# Patient Record
Sex: FEMALE | Race: WHITE | Employment: FULL TIME | URBAN - METROPOLITAN AREA
[De-identification: names, ages, dates, MRNs, and addresses within clinical notes are randomized per-mention and may not be internally consistent; named-entity substitution may affect disease eponyms.]

---

## 2018-02-09 PROBLEM — G45.9 TIA (TRANSIENT ISCHEMIC ATTACK): Status: ACTIVE | Noted: 2018-02-09

## 2018-02-10 PROBLEM — I63.9 ACUTE CVA (CEREBROVASCULAR ACCIDENT) (HCC): Status: ACTIVE | Noted: 2018-02-10

## 2018-02-26 PROBLEM — I48.0 PAF (PAROXYSMAL ATRIAL FIBRILLATION) (HCC): Status: ACTIVE | Noted: 2018-02-26

## 2018-03-14 ENCOUNTER — OFFICE VISIT (OUTPATIENT)
Dept: CARDIOLOGY CLINIC | Age: 63
End: 2018-03-14
Payer: COMMERCIAL

## 2018-03-14 VITALS
WEIGHT: 169.9 LBS | SYSTOLIC BLOOD PRESSURE: 118 MMHG | HEART RATE: 100 BPM | RESPIRATION RATE: 16 BRPM | DIASTOLIC BLOOD PRESSURE: 82 MMHG | BODY MASS INDEX: 25.75 KG/M2 | HEIGHT: 68 IN

## 2018-03-14 DIAGNOSIS — I48.0 PAF (PAROXYSMAL ATRIAL FIBRILLATION) (HCC): Primary | ICD-10-CM

## 2018-03-14 PROCEDURE — G8427 DOCREV CUR MEDS BY ELIG CLIN: HCPCS | Performed by: INTERNAL MEDICINE

## 2018-03-14 PROCEDURE — 1036F TOBACCO NON-USER: CPT | Performed by: INTERNAL MEDICINE

## 2018-03-14 PROCEDURE — 3017F COLORECTAL CA SCREEN DOC REV: CPT | Performed by: INTERNAL MEDICINE

## 2018-03-14 PROCEDURE — 99214 OFFICE O/P EST MOD 30 MIN: CPT | Performed by: INTERNAL MEDICINE

## 2018-03-14 PROCEDURE — 1111F DSCHRG MED/CURRENT MED MERGE: CPT | Performed by: INTERNAL MEDICINE

## 2018-03-14 PROCEDURE — G8419 CALC BMI OUT NRM PARAM NOF/U: HCPCS | Performed by: INTERNAL MEDICINE

## 2018-03-14 PROCEDURE — 93000 ELECTROCARDIOGRAM COMPLETE: CPT | Performed by: INTERNAL MEDICINE

## 2018-03-14 PROCEDURE — G8484 FLU IMMUNIZE NO ADMIN: HCPCS | Performed by: INTERNAL MEDICINE

## 2018-03-14 PROCEDURE — 3014F SCREEN MAMMO DOC REV: CPT | Performed by: INTERNAL MEDICINE

## 2018-03-14 PROCEDURE — G8598 ASA/ANTIPLAT THER USED: HCPCS | Performed by: INTERNAL MEDICINE

## 2018-03-14 RX ORDER — METOPROLOL SUCCINATE 25 MG
25 TABLET, EXTENDED RELEASE 24 HR ORAL 2 TIMES DAILY
Qty: 145 TABLET | Refills: 3
Start: 2018-03-14 | End: 2018-05-14 | Stop reason: SDUPTHER

## 2018-03-15 ENCOUNTER — TELEPHONE (OUTPATIENT)
Dept: NEUROLOGY | Age: 63
End: 2018-03-15

## 2018-03-15 ENCOUNTER — TELEPHONE (OUTPATIENT)
Dept: CARDIOLOGY CLINIC | Age: 63
End: 2018-03-15

## 2018-03-15 NOTE — PROGRESS NOTES
Illness: Dayron Pelletier states that She does house work , goes up the stairs, & goes shopping. She states that she is feeling much improved and much less energy  She denies any chest discomfort, dyspnea on exertion, orthopnea/PND. She has chronic lower extremity edema that she states has not changed for many years. She denies any presyncopal symptoms. She does occasionally still feel some palpitations. REVIEW OF SYSTEMS:  As above. Patient does not complain of any fever, chills, nausea, vomiting or diarrhea. No focal, motor or neurological deficits. No changes in his/her vision, hearing, bowel or bladder habits. She is not known to have a history of thyroid problems. No recent nose bleeds. PHYSICAL EXAM:  Vitals:    03/14/18 0801   BP: 118/82   Pulse: 100   Resp: 16   Weight: 169 lb 14.4 oz (77.1 kg)   Height: 5' 8\" (1.727 m)       GENERAL:  She is alert and oriented x 3, communicates well, in no distress. NECK:  No masses, trachea is mid position. Supple, full ROM, no JVD or bruits. No palpable thyromegaly or lymphadenopathy. HEART:  irregular rate and rhythm. Normal S1 and S2. There is a I/VI systolic murmur. LUNGS:  Clear to auscultation bilaterally. No use of accessory muscles. symmetrical excursion. ABDOMEN:  Soft, non-tender. Normal bowel sounds. EXTREMITIES:  Full ROM x 4. Mild to moderate bilateral lower extremity edema. Good distal pulses. EYES:  Extraocular muscles intact. PERRL. Normal lids & conjunctiva. ENT:  Nares are clear & not bleeding. Moist mucosa. Normal lips formation. No external masses   NEURO: no tremors, full ROM x 4, EOMI. SKIN:  Warm, dry and intact. Normal turgor. EKG: AF, 100 bpm, nl axis, nonspecific ST - T wave changes. Assessment:   1. Acute CVA 2-8-18. 2. Newly diagnosed atrial fibrillation and Dr. Dustin Esquivelure office. Eliquis started at that time. Probable etiology for her CVA  3. Hypertension  4.  Her heart rate and blood pressure

## 2018-03-15 NOTE — TELEPHONE ENCOUNTER
Per Dr. Marlee Galan, check with neurology to see if patient can stop Plavix since she is on Eliquis for AFIB. Have neuro office call patient. Spoke with Elena Ballard. She will check with Dr. Avtar Velázquez or his NP Jojo Irby). I will keep this encounter open until there is an answer and patient is notified/chart amended.

## 2018-03-15 NOTE — TELEPHONE ENCOUNTER
From neurology standpoint - no need for Plavix in addition to Eliquis. Please forward to Dr. Walter Brar office. Thank you.

## 2018-03-28 ENCOUNTER — HOSPITAL ENCOUNTER (OUTPATIENT)
Dept: ULTRASOUND IMAGING | Age: 63
Discharge: HOME | End: 2018-03-30
Payer: COMMERCIAL

## 2018-03-28 DIAGNOSIS — R10.9 ABDOMINAL PAIN, UNSPECIFIED ABDOMINAL LOCATION: ICD-10-CM

## 2018-03-28 PROCEDURE — 76705 ECHO EXAM OF ABDOMEN: CPT

## 2018-04-16 ENCOUNTER — NURSE ONLY (OUTPATIENT)
Dept: CARDIOLOGY CLINIC | Age: 63
End: 2018-04-16

## 2018-04-16 DIAGNOSIS — Z79.899 MEDICATION MANAGEMENT: Primary | ICD-10-CM

## 2018-05-02 ENCOUNTER — OFFICE VISIT (OUTPATIENT)
Dept: NEUROLOGY | Age: 63
End: 2018-05-02
Payer: COMMERCIAL

## 2018-05-02 VITALS
HEIGHT: 68 IN | DIASTOLIC BLOOD PRESSURE: 96 MMHG | WEIGHT: 170 LBS | BODY MASS INDEX: 25.76 KG/M2 | SYSTOLIC BLOOD PRESSURE: 139 MMHG | OXYGEN SATURATION: 96 % | HEART RATE: 79 BPM

## 2018-05-02 DIAGNOSIS — I63.431 CEREBROVASCULAR ACCIDENT (CVA) DUE TO EMBOLISM OF RIGHT POSTERIOR CEREBRAL ARTERY (HCC): Primary | ICD-10-CM

## 2018-05-02 PROCEDURE — 3017F COLORECTAL CA SCREEN DOC REV: CPT | Performed by: NURSE PRACTITIONER

## 2018-05-02 PROCEDURE — G8598 ASA/ANTIPLAT THER USED: HCPCS | Performed by: NURSE PRACTITIONER

## 2018-05-02 PROCEDURE — 99214 OFFICE O/P EST MOD 30 MIN: CPT | Performed by: NURSE PRACTITIONER

## 2018-05-02 PROCEDURE — G8419 CALC BMI OUT NRM PARAM NOF/U: HCPCS | Performed by: NURSE PRACTITIONER

## 2018-05-02 PROCEDURE — G8427 DOCREV CUR MEDS BY ELIG CLIN: HCPCS | Performed by: NURSE PRACTITIONER

## 2018-05-02 PROCEDURE — 1036F TOBACCO NON-USER: CPT | Performed by: NURSE PRACTITIONER

## 2018-05-14 ENCOUNTER — OFFICE VISIT (OUTPATIENT)
Dept: CARDIOLOGY CLINIC | Age: 63
End: 2018-05-14
Payer: COMMERCIAL

## 2018-05-14 VITALS
DIASTOLIC BLOOD PRESSURE: 78 MMHG | HEIGHT: 68 IN | HEART RATE: 68 BPM | SYSTOLIC BLOOD PRESSURE: 154 MMHG | WEIGHT: 168 LBS | RESPIRATION RATE: 12 BRPM | BODY MASS INDEX: 25.46 KG/M2

## 2018-05-14 DIAGNOSIS — I48.0 PAF (PAROXYSMAL ATRIAL FIBRILLATION) (HCC): Primary | ICD-10-CM

## 2018-05-14 PROCEDURE — G8419 CALC BMI OUT NRM PARAM NOF/U: HCPCS | Performed by: INTERNAL MEDICINE

## 2018-05-14 PROCEDURE — G8598 ASA/ANTIPLAT THER USED: HCPCS | Performed by: INTERNAL MEDICINE

## 2018-05-14 PROCEDURE — 1036F TOBACCO NON-USER: CPT | Performed by: INTERNAL MEDICINE

## 2018-05-14 PROCEDURE — 3017F COLORECTAL CA SCREEN DOC REV: CPT | Performed by: INTERNAL MEDICINE

## 2018-05-14 PROCEDURE — 99215 OFFICE O/P EST HI 40 MIN: CPT | Performed by: INTERNAL MEDICINE

## 2018-05-14 PROCEDURE — 93000 ELECTROCARDIOGRAM COMPLETE: CPT | Performed by: INTERNAL MEDICINE

## 2018-05-14 PROCEDURE — G8427 DOCREV CUR MEDS BY ELIG CLIN: HCPCS | Performed by: INTERNAL MEDICINE

## 2018-05-14 RX ORDER — METOPROLOL SUCCINATE 50 MG/1
50 TABLET, EXTENDED RELEASE ORAL 2 TIMES DAILY
Qty: 180 TABLET | Refills: 3 | Status: SHIPPED | OUTPATIENT
Start: 2018-05-14 | End: 2019-05-18 | Stop reason: SDUPTHER

## 2018-05-21 ENCOUNTER — HOSPITAL ENCOUNTER (OUTPATIENT)
Dept: CARDIAC CATH/INVASIVE PROCEDURES | Age: 63
Discharge: HOME | End: 2018-05-21
Payer: COMMERCIAL

## 2018-05-21 ENCOUNTER — ANESTHESIA (OUTPATIENT)
Dept: CARDIAC CATH/INVASIVE PROCEDURES | Age: 63
End: 2018-05-21

## 2018-05-21 ENCOUNTER — ANESTHESIA EVENT (OUTPATIENT)
Dept: CARDIAC CATH/INVASIVE PROCEDURES | Age: 63
End: 2018-05-21

## 2018-05-21 VITALS
RESPIRATION RATE: 18 BRPM | SYSTOLIC BLOOD PRESSURE: 170 MMHG | HEART RATE: 107 BPM | DIASTOLIC BLOOD PRESSURE: 105 MMHG | TEMPERATURE: 98.2 F

## 2018-05-21 VITALS
DIASTOLIC BLOOD PRESSURE: 89 MMHG | SYSTOLIC BLOOD PRESSURE: 131 MMHG | RESPIRATION RATE: 20 BRPM | OXYGEN SATURATION: 98 %

## 2018-05-21 LAB
ANION GAP SERPL CALCULATED.3IONS-SCNC: 16 MMOL/L (ref 7–16)
APTT: 38.9 SEC (ref 24.5–35.1)
BASOPHILS ABSOLUTE: 0.03 E9/L (ref 0–0.2)
BASOPHILS RELATIVE PERCENT: 0.3 % (ref 0–2)
BUN BLDV-MCNC: 23 MG/DL (ref 8–23)
CALCIUM SERPL-MCNC: 9.9 MG/DL (ref 8.6–10.2)
CHLORIDE BLD-SCNC: 104 MMOL/L (ref 98–107)
CO2: 24 MMOL/L (ref 22–29)
CREAT SERPL-MCNC: 0.9 MG/DL (ref 0.5–1)
EOSINOPHILS ABSOLUTE: 0.06 E9/L (ref 0.05–0.5)
EOSINOPHILS RELATIVE PERCENT: 0.7 % (ref 0–6)
GFR AFRICAN AMERICAN: >60
GFR NON-AFRICAN AMERICAN: >60 ML/MIN/1.73
GLUCOSE BLD-MCNC: 100 MG/DL (ref 74–109)
HCT VFR BLD CALC: 43.3 % (ref 34–48)
HEMOGLOBIN: 13.7 G/DL (ref 11.5–15.5)
IMMATURE GRANULOCYTES #: 0.01 E9/L
IMMATURE GRANULOCYTES %: 0.1 % (ref 0–5)
INR BLD: 1.8
LV EF: 53 %
LVEF MODALITY: NORMAL
LYMPHOCYTES ABSOLUTE: 1.68 E9/L (ref 1.5–4)
LYMPHOCYTES RELATIVE PERCENT: 18.7 % (ref 20–42)
MAGNESIUM: 1.9 MG/DL (ref 1.6–2.6)
MCH RBC QN AUTO: 27.2 PG (ref 26–35)
MCHC RBC AUTO-ENTMCNC: 31.6 % (ref 32–34.5)
MCV RBC AUTO: 86.1 FL (ref 80–99.9)
MONOCYTES ABSOLUTE: 0.71 E9/L (ref 0.1–0.95)
MONOCYTES RELATIVE PERCENT: 7.9 % (ref 2–12)
NEUTROPHILS ABSOLUTE: 6.48 E9/L (ref 1.8–7.3)
NEUTROPHILS RELATIVE PERCENT: 72.3 % (ref 43–80)
PDW BLD-RTO: 13 FL (ref 11.5–15)
PLATELET # BLD: 173 E9/L (ref 130–450)
PMV BLD AUTO: 12 FL (ref 7–12)
POTASSIUM SERPL-SCNC: 4.2 MMOL/L (ref 3.5–5)
PROTHROMBIN TIME: 20.3 SEC (ref 9.3–12.4)
RBC # BLD: 5.03 E12/L (ref 3.5–5.5)
SODIUM BLD-SCNC: 144 MMOL/L (ref 132–146)
WBC # BLD: 9 E9/L (ref 4.5–11.5)

## 2018-05-21 PROCEDURE — 93325 DOPPLER ECHO COLOR FLOW MAPG: CPT

## 2018-05-21 PROCEDURE — 85610 PROTHROMBIN TIME: CPT

## 2018-05-21 PROCEDURE — 36415 COLL VENOUS BLD VENIPUNCTURE: CPT

## 2018-05-21 PROCEDURE — 2580000003 HC RX 258: Performed by: INTERNAL MEDICINE

## 2018-05-21 PROCEDURE — 2709999900 HC NON-CHARGEABLE SUPPLY

## 2018-05-21 PROCEDURE — 83735 ASSAY OF MAGNESIUM: CPT

## 2018-05-21 PROCEDURE — 6360000002 HC RX W HCPCS: Performed by: NURSE ANESTHETIST, CERTIFIED REGISTERED

## 2018-05-21 PROCEDURE — 99999 PR OFFICE/OUTPT VISIT,PROCEDURE ONLY: CPT | Performed by: INTERNAL MEDICINE

## 2018-05-21 PROCEDURE — 6370000000 HC RX 637 (ALT 250 FOR IP): Performed by: INTERNAL MEDICINE

## 2018-05-21 PROCEDURE — 3700000001 HC ADD 15 MINUTES (ANESTHESIA)

## 2018-05-21 PROCEDURE — 3700000000 HC ANESTHESIA ATTENDED CARE

## 2018-05-21 PROCEDURE — 2580000003 HC RX 258: Performed by: NURSE ANESTHETIST, CERTIFIED REGISTERED

## 2018-05-21 PROCEDURE — 92960 CARDIOVERSION ELECTRIC EXT: CPT | Performed by: INTERNAL MEDICINE

## 2018-05-21 PROCEDURE — 85025 COMPLETE CBC W/AUTO DIFF WBC: CPT

## 2018-05-21 PROCEDURE — 93312 ECHO TRANSESOPHAGEAL: CPT

## 2018-05-21 PROCEDURE — 80048 BASIC METABOLIC PNL TOTAL CA: CPT

## 2018-05-21 PROCEDURE — 93321 DOPPLER ECHO F-UP/LMTD STD: CPT

## 2018-05-21 PROCEDURE — 85730 THROMBOPLASTIN TIME PARTIAL: CPT

## 2018-05-21 RX ORDER — SOTALOL HYDROCHLORIDE 80 MG/1
80 TABLET ORAL 2 TIMES DAILY
Status: DISCONTINUED | OUTPATIENT
Start: 2018-05-21 | End: 2018-05-21

## 2018-05-21 RX ORDER — SODIUM CHLORIDE 9 MG/ML
INJECTION, SOLUTION INTRAVENOUS ONCE
Status: COMPLETED | OUTPATIENT
Start: 2018-05-21 | End: 2018-05-21

## 2018-05-21 RX ORDER — SODIUM CHLORIDE 9 MG/ML
INJECTION, SOLUTION INTRAVENOUS CONTINUOUS PRN
Status: DISCONTINUED | OUTPATIENT
Start: 2018-05-21 | End: 2018-05-21 | Stop reason: SDUPTHER

## 2018-05-21 RX ORDER — PROPOFOL 10 MG/ML
INJECTION, EMULSION INTRAVENOUS PRN
Status: DISCONTINUED | OUTPATIENT
Start: 2018-05-21 | End: 2018-05-21 | Stop reason: SDUPTHER

## 2018-05-21 RX ADMIN — SODIUM CHLORIDE: 9 INJECTION, SOLUTION INTRAVENOUS at 11:21

## 2018-05-21 RX ADMIN — SODIUM CHLORIDE: 9 INJECTION, SOLUTION INTRAVENOUS at 09:56

## 2018-05-21 RX ADMIN — Medication 1 LOZENGE: at 12:35

## 2018-05-21 RX ADMIN — PROPOFOL 220 MG: 10 INJECTION, EMULSION INTRAVENOUS at 11:40

## 2018-05-21 ASSESSMENT — ENCOUNTER SYMPTOMS: SHORTNESS OF BREATH: 0

## 2018-05-30 ENCOUNTER — NURSE ONLY (OUTPATIENT)
Dept: CARDIOLOGY CLINIC | Age: 63
End: 2018-05-30
Payer: COMMERCIAL

## 2018-05-30 DIAGNOSIS — I48.0 PAF (PAROXYSMAL ATRIAL FIBRILLATION) (HCC): Primary | ICD-10-CM

## 2018-05-30 PROCEDURE — 93000 ELECTROCARDIOGRAM COMPLETE: CPT | Performed by: INTERNAL MEDICINE

## 2018-07-12 ENCOUNTER — HOSPITAL ENCOUNTER (OUTPATIENT)
Dept: GENERAL RADIOLOGY | Age: 63
Discharge: HOME | End: 2018-07-14
Payer: COMMERCIAL

## 2018-07-12 ENCOUNTER — HOSPITAL ENCOUNTER (OUTPATIENT)
Dept: MRI IMAGING | Age: 63
Discharge: HOME | End: 2018-07-14
Payer: COMMERCIAL

## 2018-07-12 DIAGNOSIS — K83.1 OBSTRUCTION OF BILE DUCT: ICD-10-CM

## 2018-07-12 DIAGNOSIS — Z12.31 VISIT FOR SCREENING MAMMOGRAM: ICD-10-CM

## 2018-07-12 DIAGNOSIS — R10.9 ABDOMINAL PAIN, UNSPECIFIED ABDOMINAL LOCATION: ICD-10-CM

## 2018-07-12 DIAGNOSIS — R94.5 ABNORMAL RESULTS OF LIVER FUNCTION STUDIES: ICD-10-CM

## 2018-07-12 PROCEDURE — 74183 MRI ABD W/O CNTR FLWD CNTR: CPT

## 2018-07-12 PROCEDURE — A9579 GAD-BASE MR CONTRAST NOS,1ML: HCPCS | Performed by: RADIOLOGY

## 2018-07-12 PROCEDURE — 77063 BREAST TOMOSYNTHESIS BI: CPT

## 2018-07-12 PROCEDURE — 6360000004 HC RX CONTRAST MEDICATION: Performed by: RADIOLOGY

## 2018-07-12 RX ADMIN — GADOTERIDOL 15 ML: 279.3 INJECTION, SOLUTION INTRAVENOUS at 15:35

## 2018-08-14 ENCOUNTER — OFFICE VISIT (OUTPATIENT)
Dept: CARDIOLOGY CLINIC | Age: 63
End: 2018-08-14
Payer: COMMERCIAL

## 2018-08-14 VITALS
BODY MASS INDEX: 25.46 KG/M2 | SYSTOLIC BLOOD PRESSURE: 138 MMHG | WEIGHT: 168 LBS | HEIGHT: 68 IN | DIASTOLIC BLOOD PRESSURE: 78 MMHG | HEART RATE: 60 BPM | RESPIRATION RATE: 12 BRPM

## 2018-08-14 DIAGNOSIS — I48.0 PAF (PAROXYSMAL ATRIAL FIBRILLATION) (HCC): Primary | ICD-10-CM

## 2018-08-14 DIAGNOSIS — I10 ESSENTIAL HYPERTENSION: ICD-10-CM

## 2018-08-14 PROCEDURE — 93000 ELECTROCARDIOGRAM COMPLETE: CPT | Performed by: INTERNAL MEDICINE

## 2018-08-14 PROCEDURE — 3017F COLORECTAL CA SCREEN DOC REV: CPT | Performed by: INTERNAL MEDICINE

## 2018-08-14 PROCEDURE — G8419 CALC BMI OUT NRM PARAM NOF/U: HCPCS | Performed by: INTERNAL MEDICINE

## 2018-08-14 PROCEDURE — 99214 OFFICE O/P EST MOD 30 MIN: CPT | Performed by: INTERNAL MEDICINE

## 2018-08-14 PROCEDURE — G8427 DOCREV CUR MEDS BY ELIG CLIN: HCPCS | Performed by: INTERNAL MEDICINE

## 2018-08-14 PROCEDURE — G8598 ASA/ANTIPLAT THER USED: HCPCS | Performed by: INTERNAL MEDICINE

## 2018-08-14 PROCEDURE — 1036F TOBACCO NON-USER: CPT | Performed by: INTERNAL MEDICINE

## 2018-08-14 RX ORDER — LISINOPRIL 10 MG/1
10 TABLET ORAL 2 TIMES DAILY
Qty: 180 TABLET | Refills: 3 | Status: SHIPPED
Start: 2018-08-14 | End: 2020-12-29

## 2018-08-15 NOTE — PROGRESS NOTES
exertion, orthopnea/PND. She has chronic lower extremity edema that she states has not changed for many years. She denies any presyncopal symptoms. She does occasionally still feel some palpitations. REVIEW OF SYSTEMS:  As above. Patient does not complain of any fever, chills, nausea, vomiting or diarrhea. No focal, motor or neurological deficits. No changes in his/her vision, hearing, bowel or bladder habits. She is not known to have a history of thyroid problems. No recent nose bleeds. PHYSICAL EXAM:  Vitals:    08/14/18 0751   BP: 138/78   Pulse: 60   Resp: 12   Weight: 168 lb (76.2 kg)   Height: 5' 8\" (1.727 m)       GENERAL:  She is alert and oriented x 3, communicates well, in no distress. NECK:  No masses, trachea is mid position. Supple, full ROM, no JVD or bruits. No palpable thyromegaly or lymphadenopathy. HEART:  irregular rate and rhythm. Normal S1 and S2. There is a I-II/VI systolic murmur. LUNGS:  Clear to auscultation bilaterally. No use of accessory muscles. symmetrical excursion. ABDOMEN:  Soft, non-tender. Normal bowel sounds. EXTREMITIES:  Full ROM x 4. Mild to moderate bilateral lower extremity edema. Good distal pulses. EYES:  Extraocular muscles intact. PERRL. Normal lids & conjunctiva. ENT:  Nares are clear & not bleeding. Moist mucosa. Normal lips formation. No external masses   NEURO: no tremors, full ROM x 4, EOMI. SKIN:  Warm, dry and intact. Normal turgor. EKG: Sinus rhythm, 60 bpm, nl axis, nonspecific ST - T wave changes. Assessment:   1. Acute CVA 2818. Hypertension. Not well controlled today. 2. Paroxysmal atrial fibrillation. Maintaining sinus rhythm. 3. Hypertension, not well controlled today. 4. CVA 218  5. Chronic lower extremity edema. Unchanged        Recommendations:  1. Increase the lisinopril to twice a day  2. BMP      Thank you for allowing me to participate in your patient's care.       [address], [facility]  Interventional Cardiology

## 2018-08-30 DIAGNOSIS — I10 ESSENTIAL HYPERTENSION: ICD-10-CM

## 2018-11-18 ENCOUNTER — HOSPITAL ENCOUNTER (EMERGENCY)
Age: 63
Discharge: HOME | End: 2018-11-18
Payer: COMMERCIAL

## 2018-11-18 VITALS
DIASTOLIC BLOOD PRESSURE: 97 MMHG | OXYGEN SATURATION: 97 % | TEMPERATURE: 97.4 F | SYSTOLIC BLOOD PRESSURE: 187 MMHG | HEIGHT: 68 IN | WEIGHT: 167 LBS | RESPIRATION RATE: 15 BRPM | BODY MASS INDEX: 25.31 KG/M2 | HEART RATE: 65 BPM

## 2018-11-18 DIAGNOSIS — K06.8 BLEEDING GUMS: Primary | ICD-10-CM

## 2018-11-18 LAB
BASOPHILS ABSOLUTE: 0.04 E9/L (ref 0–0.2)
BASOPHILS RELATIVE PERCENT: 0.5 % (ref 0–2)
EOSINOPHILS ABSOLUTE: 0.09 E9/L (ref 0.05–0.5)
EOSINOPHILS RELATIVE PERCENT: 1.2 % (ref 0–6)
HCT VFR BLD CALC: 43.1 % (ref 34–48)
HEMOGLOBIN: 14.1 G/DL (ref 11.5–15.5)
IMMATURE GRANULOCYTES #: 0.02 E9/L
IMMATURE GRANULOCYTES %: 0.3 % (ref 0–5)
LYMPHOCYTES ABSOLUTE: 1.54 E9/L (ref 1.5–4)
LYMPHOCYTES RELATIVE PERCENT: 20.8 % (ref 20–42)
MCH RBC QN AUTO: 28.4 PG (ref 26–35)
MCHC RBC AUTO-ENTMCNC: 32.7 % (ref 32–34.5)
MCV RBC AUTO: 86.7 FL (ref 80–99.9)
MONOCYTES ABSOLUTE: 0.75 E9/L (ref 0.1–0.95)
MONOCYTES RELATIVE PERCENT: 10.1 % (ref 2–12)
NEUTROPHILS ABSOLUTE: 4.98 E9/L (ref 1.8–7.3)
NEUTROPHILS RELATIVE PERCENT: 67.1 % (ref 43–80)
PDW BLD-RTO: 12.6 FL (ref 11.5–15)
PLATELET # BLD: 209 E9/L (ref 130–450)
PMV BLD AUTO: 11.6 FL (ref 7–12)
RBC # BLD: 4.97 E12/L (ref 3.5–5.5)
WBC # BLD: 7.4 E9/L (ref 4.5–11.5)

## 2018-11-18 PROCEDURE — 99283 EMERGENCY DEPT VISIT LOW MDM: CPT

## 2018-11-18 PROCEDURE — 36415 COLL VENOUS BLD VENIPUNCTURE: CPT

## 2018-11-18 PROCEDURE — 2500000003 HC RX 250 WO HCPCS: Performed by: PHYSICIAN ASSISTANT

## 2018-11-18 PROCEDURE — 85025 COMPLETE CBC W/AUTO DIFF WBC: CPT

## 2018-11-18 PROCEDURE — 2580000003 HC RX 258: Performed by: PHYSICIAN ASSISTANT

## 2018-11-18 RX ORDER — LIDOCAINE HYDROCHLORIDE AND EPINEPHRINE 10; 10 MG/ML; UG/ML
20 INJECTION, SOLUTION INFILTRATION; PERINEURAL ONCE
Status: COMPLETED | OUTPATIENT
Start: 2018-11-18 | End: 2018-11-18

## 2018-11-18 RX ADMIN — LIDOCAINE HYDROCHLORIDE,EPINEPHRINE BITARTRATE 20 ML: 10; .01 INJECTION, SOLUTION INFILTRATION; PERINEURAL at 11:19

## 2018-11-18 RX ADMIN — TRANEXAMIC ACID 1000 MG: 100 INJECTION, SOLUTION INTRAVENOUS at 11:20

## 2018-11-18 ASSESSMENT — PAIN SCALES - GENERAL: PAINLEVEL_OUTOF10: 0

## 2018-11-18 NOTE — ED PROVIDER NOTES
Medications   lidocaine-EPINEPHrine 1 percent-1:[id number] injection 20 mL (20 mLs Intradermal Given 11/18/18 1119)   tranexamic acid (CYKLOKAPRON) 1,000 mg in dextrose 5 % 100 mL IVPB (0 mg Irrigation Stopped 11/18/18 1126)        Consult(s):   Dental Resident was not consulted to see patient regarding complaint. Procedure(s):    none. Counseling/MDM:    Patient in no acute distress. Vital signs stable. CBC stable. Accommodation of lidocaine with epinephrine as well as TXA was used on a dressing applied to the area. Bleeding controlled. Patient will follow with PCP. Educated on the signs and symptoms to return to the ED. Discharged in stable condition. Assessment      1. Bleeding gums      Plan   Discharge to home  Patient condition is good    New Medications     Discharge Medication List as of 11/18/2018 10:54 AM        Electronically signed by MARGRET Reynolds   DD: 11/18/18  **This report was transcribed using voice recognition software. Every effort was made to ensure accuracy; however, inadvertent computerized transcription errors may be present.   END OF ED PROVIDER NOTE       Eric Reynolds  11/18/18 5001

## 2018-12-04 ENCOUNTER — OFFICE VISIT (OUTPATIENT)
Dept: NEUROLOGY | Age: 63
End: 2018-12-04
Payer: COMMERCIAL

## 2018-12-04 VITALS
HEART RATE: 62 BPM | HEIGHT: 68 IN | SYSTOLIC BLOOD PRESSURE: 159 MMHG | BODY MASS INDEX: 25.45 KG/M2 | TEMPERATURE: 97.3 F | WEIGHT: 167.9 LBS | DIASTOLIC BLOOD PRESSURE: 81 MMHG | OXYGEN SATURATION: 99 % | RESPIRATION RATE: 14 BRPM

## 2018-12-04 DIAGNOSIS — I63.431 CEREBROVASCULAR ACCIDENT (CVA) DUE TO EMBOLISM OF RIGHT POSTERIOR CEREBRAL ARTERY (HCC): Primary | ICD-10-CM

## 2018-12-04 PROCEDURE — 3017F COLORECTAL CA SCREEN DOC REV: CPT | Performed by: CLINICAL NURSE SPECIALIST

## 2018-12-04 PROCEDURE — G8484 FLU IMMUNIZE NO ADMIN: HCPCS | Performed by: CLINICAL NURSE SPECIALIST

## 2018-12-04 PROCEDURE — 1036F TOBACCO NON-USER: CPT | Performed by: CLINICAL NURSE SPECIALIST

## 2018-12-04 PROCEDURE — G8598 ASA/ANTIPLAT THER USED: HCPCS | Performed by: CLINICAL NURSE SPECIALIST

## 2018-12-04 PROCEDURE — G8427 DOCREV CUR MEDS BY ELIG CLIN: HCPCS | Performed by: CLINICAL NURSE SPECIALIST

## 2018-12-04 PROCEDURE — 99215 OFFICE O/P EST HI 40 MIN: CPT | Performed by: CLINICAL NURSE SPECIALIST

## 2018-12-04 PROCEDURE — G8419 CALC BMI OUT NRM PARAM NOF/U: HCPCS | Performed by: CLINICAL NURSE SPECIALIST

## 2018-12-04 NOTE — PROGRESS NOTES
needed for Pain Yes Neri Hollis MD   aspirin 81 MG chewable tablet Take 1 tablet by mouth daily Yes Neri Hollis MD   atorvastatin (LIPITOR) 40 MG tablet Take 1 tablet by mouth nightly Yes Neri Hollis MD   calcium carbonate (OSCAL) 500 MG TABS tablet Take 1 tablet by mouth 2 times daily Yes Neri Hollis MD       Allergies as of 12/04/2018    (No Known Allergies)       Objective:     BP (!) 159/81 (Site: Left Upper Arm, Position: Sitting, Cuff Size: Medium Adult)   Pulse 62   Temp 97.3 °F (36.3 °C) (Oral)   Resp 14   Ht 5' 8\" (1.727 m)   Wt 167 lb 14.4 oz (76.2 kg)   SpO2 99%   BMI 25.53 kg/m²      General appearance: alert, appears stated age and cooperative  Head: Normocephalic, without obvious abnormality, atraumatic  Neck: no adenopathy, no carotid bruit and limited ROM  Lungs: clear to auscultation bilaterally  Heart: regular rate and rhythm  Extremities: no cyanosis or edema  Pulses: 2+ and symmetric  Skin: no rashes or lesions    Mental Status: Alert, oriented, thought content appropriate    Speech: clear  Language: appropriate    Cranial Nerves:  I: smell    II: visual acuity     II: visual fields Left lower quadrantanopsia    II: pupils ELIECER   III,VII: ptosis None   III,IV,VI: extraocular muscles  EOMI without nystagmus    V: mastication Normal   V: facial light touch sensation  Normal   V,VII: corneal reflex  Present   VII: facial muscle function - upper     VII: facial muscle function - lower Normal   VIII: hearing Normal   IX: soft palate elevation  Normal   IX,X: gag reflex Present   XI: trapezius strength  5/5   XI: sternocleidomastoid strength 5/5   XI: neck extension strength  5/5   XII: tongue strength  Normal     Motor:  5/5 throughout  Normal bulk and tone    Sensory:  Normal to LT and PP  Vibration minimally decreased in ankles      Coordination:   FN, FFM and SERGEI symmetrical  HS testing symmetrical     Gait:  Normal     DTR:   No reflexes    No Jaimes's

## 2019-01-29 RX ORDER — METOPROLOL SUCCINATE 100 MG/1
100 TABLET, EXTENDED RELEASE ORAL 2 TIMES DAILY
Qty: 30 TABLET | Refills: 11 | Status: SHIPPED
Start: 2019-01-29 | End: 2020-02-17 | Stop reason: SDUPTHER

## 2019-05-20 RX ORDER — METOPROLOL SUCCINATE 50 MG/1
TABLET, EXTENDED RELEASE ORAL
Qty: 180 TABLET | Refills: 3 | Status: SHIPPED
Start: 2019-05-20 | End: 2020-06-07

## 2019-06-12 NOTE — TELEPHONE ENCOUNTER
Pt calling to see if there are any samples of Eliquis 5 mg bid she would be able to . Please call on her cell number.

## 2019-06-12 NOTE — TELEPHONE ENCOUNTER
I called and left a message on Moon's voicemail letting her know her samples of Eliquis are available to be picked up.

## 2019-07-02 ENCOUNTER — HOSPITAL ENCOUNTER (OUTPATIENT)
Dept: ULTRASOUND IMAGING | Age: 64
Discharge: HOME | End: 2019-07-04
Payer: COMMERCIAL

## 2019-07-02 DIAGNOSIS — R94.5 ABNORMAL FINDING ON LIVER FUNCTION: ICD-10-CM

## 2019-07-02 PROCEDURE — 76700 US EXAM ABDOM COMPLETE: CPT

## 2019-07-16 ENCOUNTER — OFFICE VISIT (OUTPATIENT)
Dept: NEUROLOGY | Age: 64
End: 2019-07-16
Payer: COMMERCIAL

## 2019-07-16 VITALS
HEIGHT: 68 IN | HEART RATE: 66 BPM | RESPIRATION RATE: 18 BRPM | SYSTOLIC BLOOD PRESSURE: 148 MMHG | DIASTOLIC BLOOD PRESSURE: 86 MMHG | WEIGHT: 171.4 LBS | BODY MASS INDEX: 25.98 KG/M2 | OXYGEN SATURATION: 98 %

## 2019-07-16 DIAGNOSIS — I63.431 CEREBROVASCULAR ACCIDENT (CVA) DUE TO EMBOLISM OF RIGHT POSTERIOR CEREBRAL ARTERY (HCC): Primary | ICD-10-CM

## 2019-07-16 PROCEDURE — G8427 DOCREV CUR MEDS BY ELIG CLIN: HCPCS | Performed by: CLINICAL NURSE SPECIALIST

## 2019-07-16 PROCEDURE — G8598 ASA/ANTIPLAT THER USED: HCPCS | Performed by: CLINICAL NURSE SPECIALIST

## 2019-07-16 PROCEDURE — 3017F COLORECTAL CA SCREEN DOC REV: CPT | Performed by: CLINICAL NURSE SPECIALIST

## 2019-07-16 PROCEDURE — 99214 OFFICE O/P EST MOD 30 MIN: CPT | Performed by: CLINICAL NURSE SPECIALIST

## 2019-07-16 PROCEDURE — G8419 CALC BMI OUT NRM PARAM NOF/U: HCPCS | Performed by: CLINICAL NURSE SPECIALIST

## 2019-07-16 PROCEDURE — 1036F TOBACCO NON-USER: CPT | Performed by: CLINICAL NURSE SPECIALIST

## 2019-07-16 NOTE — PROGRESS NOTES
Laboratory/Radiology:     CBC:   Lab Results   Component Value Date    WBC 7.4 2018    RBC 4.97 2018    HGB 14.1 2018    HCT 43.1 2018    MCV 86.7 2018    MCH 28.4 2018    MCHC 32.7 2018    RDW 12.6 2018     2018    MPV 11.6 2018     BMP:    Lab Results   Component Value Date     2018    K 4.2 2018     2018    CO2 24 2018    BUN 23 2018    LABALBU 3.8 2018    CREATININE 0.9 2018    CALCIUM 9.9 2018    GFRAA >60 2018    LABGLOM >60 2018    GLUCOSE 100 2018     HgBA1c:    Lab Results   Component Value Date    LABA1C 5.5 02/10/2018     FLP:    Lab Results   Component Value Date    TRIG 57 02/10/2018    HDL 70 02/10/2018    LDLCALC 138 02/10/2018    LABVLDL 11 02/10/2018     CTAs: no occlusion or stenosis     MRI Brain: acute stroke right occipital lobe - moderate remote      Echo w/bubble study:  Ejection fraction is visually estimated at 55%. No regional wall motion abnormalities seen. Normal left ventricular diastolic filling pattern for age. Normal right ventricle structure and function. Left atrial volume index of 29 ml per meters squared BSA. Physiologic and/or trace mitral regurgitation is present. Physiologic and/or trace tricuspid regurgitation. Agitated saline injected for shunt evaluation. No evidence of patent foramen ovale. No evidence of atrial septal defect. I personally reviewed the patient's lab and imaging studies at this time. Assessment:     Right occipital lobe ischemic stroke   Producing left lower quadrantanopsia      This stroke was likely embolic - she has been found to have atrial fibrillation - the likely source of her stroke. I agree with [address] [place] [address] Road and management of atrial fibrillation per cardiology    She will also continue statin therapy.      Patient Active Problem List   Diagnosis    TIA (transient ischemic attack)

## 2019-07-23 ENCOUNTER — HOSPITAL ENCOUNTER (OUTPATIENT)
Dept: MAMMOGRAPHY | Age: 64
Discharge: HOME | End: 2019-07-25
Payer: COMMERCIAL

## 2019-07-23 DIAGNOSIS — Z12.31 ENCOUNTER FOR SCREENING MAMMOGRAM FOR MALIGNANT NEOPLASM OF BREAST: ICD-10-CM

## 2019-07-23 PROCEDURE — 77063 BREAST TOMOSYNTHESIS BI: CPT

## 2019-07-29 ENCOUNTER — TELEPHONE (OUTPATIENT)
Dept: ONCOLOGY | Age: 64
End: 2019-07-29

## 2019-08-02 ENCOUNTER — TELEPHONE (OUTPATIENT)
Dept: CARDIOLOGY CLINIC | Age: 64
End: 2019-08-02

## 2019-08-06 ENCOUNTER — HOSPITAL ENCOUNTER (OUTPATIENT)
Dept: GENERAL RADIOLOGY | Age: 64
Discharge: HOME | End: 2019-08-08
Payer: COMMERCIAL

## 2019-08-06 DIAGNOSIS — N64.89 BREAST ASYMMETRY: ICD-10-CM

## 2019-08-06 DIAGNOSIS — Z12.39 BREAST CANCER SCREENING: ICD-10-CM

## 2019-08-06 PROCEDURE — 76642 ULTRASOUND BREAST LIMITED: CPT

## 2019-08-06 PROCEDURE — G0279 TOMOSYNTHESIS, MAMMO: HCPCS

## 2019-10-01 ENCOUNTER — TELEPHONE (OUTPATIENT)
Dept: CARDIOLOGY CLINIC | Age: 64
End: 2019-10-01

## 2019-10-31 ENCOUNTER — OFFICE VISIT (OUTPATIENT)
Dept: CARDIOLOGY CLINIC | Age: 64
End: 2019-10-31
Payer: COMMERCIAL

## 2019-10-31 VITALS
HEART RATE: 57 BPM | HEIGHT: 68 IN | RESPIRATION RATE: 12 BRPM | BODY MASS INDEX: 25.91 KG/M2 | DIASTOLIC BLOOD PRESSURE: 80 MMHG | WEIGHT: 171 LBS | SYSTOLIC BLOOD PRESSURE: 112 MMHG

## 2019-10-31 DIAGNOSIS — I48.91 ATRIAL FIBRILLATION, UNSPECIFIED TYPE (HCC): Primary | ICD-10-CM

## 2019-10-31 PROCEDURE — 99213 OFFICE O/P EST LOW 20 MIN: CPT | Performed by: INTERNAL MEDICINE

## 2019-10-31 PROCEDURE — 93000 ELECTROCARDIOGRAM COMPLETE: CPT | Performed by: INTERNAL MEDICINE

## 2019-10-31 PROCEDURE — 3017F COLORECTAL CA SCREEN DOC REV: CPT | Performed by: INTERNAL MEDICINE

## 2019-10-31 PROCEDURE — G8484 FLU IMMUNIZE NO ADMIN: HCPCS | Performed by: INTERNAL MEDICINE

## 2019-10-31 PROCEDURE — G8598 ASA/ANTIPLAT THER USED: HCPCS | Performed by: INTERNAL MEDICINE

## 2019-10-31 PROCEDURE — G8427 DOCREV CUR MEDS BY ELIG CLIN: HCPCS | Performed by: INTERNAL MEDICINE

## 2019-10-31 PROCEDURE — 1036F TOBACCO NON-USER: CPT | Performed by: INTERNAL MEDICINE

## 2019-10-31 PROCEDURE — G8419 CALC BMI OUT NRM PARAM NOF/U: HCPCS | Performed by: INTERNAL MEDICINE

## 2020-01-13 ENCOUNTER — TELEPHONE (OUTPATIENT)
Dept: CARDIOLOGY CLINIC | Age: 65
End: 2020-01-13

## 2020-01-17 ENCOUNTER — TELEPHONE (OUTPATIENT)
Dept: CARDIOLOGY CLINIC | Age: 65
End: 2020-01-17

## 2020-01-17 NOTE — TELEPHONE ENCOUNTER
I called and left a message on her voicemail letting her know we got in some Eliquis samples and her samples are ready to be picked up.

## 2020-02-17 RX ORDER — METOPROLOL SUCCINATE 100 MG/1
100 TABLET, EXTENDED RELEASE ORAL 2 TIMES DAILY
Qty: 60 TABLET | Refills: 11 | Status: SHIPPED
Start: 2020-02-17 | End: 2021-02-12 | Stop reason: SDUPTHER

## 2020-03-02 ENCOUNTER — TELEPHONE (OUTPATIENT)
Dept: CARDIOLOGY CLINIC | Age: 65
End: 2020-03-02

## 2020-03-02 NOTE — TELEPHONE ENCOUNTER
I called and left a message on Moisés's voicemail letting her know we are out of Eliquis but put her on the list to call when we get more in.

## 2020-03-03 ENCOUNTER — HOSPITAL ENCOUNTER (OUTPATIENT)
Dept: ULTRASOUND IMAGING | Age: 65
Discharge: HOME | End: 2020-03-05
Payer: COMMERCIAL

## 2020-03-03 PROCEDURE — 76705 ECHO EXAM OF ABDOMEN: CPT

## 2020-06-07 RX ORDER — METOPROLOL SUCCINATE 50 MG/1
TABLET, EXTENDED RELEASE ORAL
Qty: 60 TABLET | Refills: 11 | Status: SHIPPED
Start: 2020-06-07 | End: 2021-06-21

## 2020-09-22 ENCOUNTER — HOSPITAL ENCOUNTER (OUTPATIENT)
Dept: GENERAL RADIOLOGY | Age: 65
Discharge: HOME | End: 2020-09-24
Payer: COMMERCIAL

## 2020-09-22 PROCEDURE — 76642 ULTRASOUND BREAST LIMITED: CPT

## 2020-09-22 PROCEDURE — 76882 US LMTD JT/FCL EVL NVASC XTR: CPT

## 2020-09-22 PROCEDURE — G0279 TOMOSYNTHESIS, MAMMO: HCPCS

## 2020-09-30 ENCOUNTER — HOSPITAL ENCOUNTER (OUTPATIENT)
Dept: GENERAL RADIOLOGY | Age: 65
End: 2020-09-30
Payer: COMMERCIAL

## 2020-09-30 ENCOUNTER — HOSPITAL ENCOUNTER (OUTPATIENT)
Dept: GENERAL RADIOLOGY | Age: 65
Discharge: HOME | End: 2020-10-02
Payer: COMMERCIAL

## 2020-09-30 PROCEDURE — 2500000003 HC RX 250 WO HCPCS

## 2020-09-30 PROCEDURE — 88305 TISSUE EXAM BY PATHOLOGIST: CPT

## 2020-09-30 PROCEDURE — 88341 IMHCHEM/IMCYTCHM EA ADD ANTB: CPT

## 2020-09-30 PROCEDURE — 88342 IMHCHEM/IMCYTCHM 1ST ANTB: CPT

## 2020-09-30 PROCEDURE — A4648 IMPLANTABLE TISSUE MARKER: HCPCS

## 2020-09-30 PROCEDURE — 77065 DX MAMMO INCL CAD UNI: CPT

## 2020-09-30 PROCEDURE — 88360 TUMOR IMMUNOHISTOCHEM/MANUAL: CPT

## 2020-09-30 PROCEDURE — 10035 PLMT SFT TISS LOCLZJ DEV 1ST: CPT

## 2020-09-30 NOTE — PROGRESS NOTES
Met with patient prior to her breast biopsy. Instructed on role of breast navigator and on breast biopsy procedure. Denies use of blood thinners or aspirin products within the past 5 days. I remained with her during the biopsy to provide instruction and emotional support. Instructed that results will be available in approximately 3-5 business days and to follow up with Dr. Jennifer Donnelly to receive results. Provided with folder containing my contact information, monthly breast self exam card, and post biopsy discharge instructions. Instructed to call me if she has any questions or concerns about her biopsy. Verbalizes understanding.  Jose, RN, OCN, CN-BN

## 2020-10-07 ENCOUNTER — TELEPHONE (OUTPATIENT)
Dept: GENERAL RADIOLOGY | Age: 65
End: 2020-10-07

## 2020-10-07 NOTE — TELEPHONE ENCOUNTER
Per Duane Engel at [facility] (for [employer]), case # [id number] has been approved for CPT code 69200- bilateral breast MRI . Authorization number: [id number]  Effective 10/7/20. Authorization expires 4/5/2021.      Electronically signed by Chaparrita Kapoor RN, BSN on 10/7/2020 at 8:41 AM

## 2020-10-09 ENCOUNTER — TELEPHONE (OUTPATIENT)
Dept: ONCOLOGY | Age: 65
End: 2020-10-09

## 2020-10-09 NOTE — TELEPHONE ENCOUNTER
Spoke with patient regarding lab work for breast MRI. Patient stated her insurance contacted her and suggested she go elsewhere for the MRI due to cost.  Patient states she is going to [facility].

## 2020-10-19 ENCOUNTER — TELEPHONE (OUTPATIENT)
Dept: GENERAL RADIOLOGY | Age: 65
End: 2020-10-19

## 2020-10-19 NOTE — TELEPHONE ENCOUNTER
Left voicemail message for Sameera Draft to call me with status of breast MRI at [facility] and surgical consultation with Dr. Nery Wood.  Electronically signed by Emilie Laboy, RN, BSN on 10/19/2020 at 12:13 PM

## 2020-11-10 ENCOUNTER — PREP FOR PROCEDURE (OUTPATIENT)
Dept: SURGERY | Age: 65
End: 2020-11-10

## 2020-11-10 ENCOUNTER — HOSPITAL ENCOUNTER (OUTPATIENT)
Age: 65
Discharge: HOME | End: 2020-11-12
Payer: COMMERCIAL

## 2020-11-10 PROCEDURE — U0003 INFECTIOUS AGENT DETECTION BY NUCLEIC ACID (DNA OR RNA); SEVERE ACUTE RESPIRATORY SYNDROME CORONAVIRUS 2 (SARS-COV-2) (CORONAVIRUS DISEASE [COVID-19]), AMPLIFIED PROBE TECHNIQUE, MAKING USE OF HIGH THROUGHPUT TECHNOLOGIES AS DESCRIBED BY CMS-2020-01-R: HCPCS

## 2020-11-10 RX ORDER — SODIUM CHLORIDE 0.9 % (FLUSH) 0.9 %
10 SYRINGE (ML) INJECTION EVERY 12 HOURS SCHEDULED
Status: CANCELLED | OUTPATIENT
Start: 2020-11-10

## 2020-11-10 RX ORDER — SODIUM CHLORIDE, SODIUM LACTATE, POTASSIUM CHLORIDE, CALCIUM CHLORIDE 600; 310; 30; 20 MG/100ML; MG/100ML; MG/100ML; MG/100ML
INJECTION, SOLUTION INTRAVENOUS CONTINUOUS
Status: CANCELLED | OUTPATIENT
Start: 2020-11-10

## 2020-11-10 NOTE — H&P (VIEW-ONLY)
Name: Hernandez Savage             : 1955 Sex: F  Age: 60 yrs  Acct#:  [id number]            CC: Routine postprocedure visit    HPI: [Again, the patient was diagnosed with metastatic left breast cancer. MRI revealed the area within the axilla and within the breast.  Patient has no other disease noted. ]    Meds Prior to Visit:  Atorvastatin Calcium     Calcium Carb-Cholecalciferol     Eliquis     Lisinopril     Metoprolol Succinate ER        Allergies:  NKDA            Exam:    On examination: No significant change has been noted in her examination. Assessment #1: Hx C50.412 Malignant neoplasm of upper-outer quadrant of left female breast   Care Plan:              Comments       : The patient will be seen by Dr. Boris Keith for appropriate primary chemotherapy. The patient may need a port placement. 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

## 2020-11-12 ENCOUNTER — ANESTHESIA EVENT (OUTPATIENT)
Dept: OPERATING ROOM | Age: 65
End: 2020-11-12
Payer: COMMERCIAL

## 2020-11-12 LAB
SARS-COV-2: NOT DETECTED
SOURCE: NORMAL

## 2020-11-12 NOTE — PROGRESS NOTES
[facility] PRE-ADMISSION TESTING INSTRUCTIONS    The Preadmission Testing patient is instructed accordingly using the following criteria (check applicable):    ARRIVAL INSTRUCTIONS:  [x] Parking the day of Surgery is located in the Main Entrance lot. Upon entering the door, make an immediate right to the surgery reception desk    [x] Bring photo ID and insurance card    [] Bring in a copy of Living will or Durable Power of  papers. [x] Please be sure to arrange transportation to and from the hospital    [x] Please arrange for someone to be with you the remainder of the day due to having anesthesia      GENERAL INSTRUCTIONS:    [x] Nothing by mouth after midnight, including gum, candy, mints or water    [x] You may brush your teeth, but do not swallow any water    [x] Take medications as instructed with 1-2 oz of water    [] Stop herbal supplements and vitamins 5 days prior to procedure    [x] Follow preop dosing of blood thinners per physician instructions    [] Do not take insulin or oral diabetic medications    [] If diabetic and have low blood sugar or feel symptomatic, take 1-2oz apple juice or glucose tablets    [] Bring inhalers day of surgery    [] Bring C-PAP/ Bi-Pap day of surgery    [] Bring urine specimen day of surgery    [x]  Soap shower or bath AM of Surgery, no lotion, powders or creams to surgical site    [] Follow bowel prep as instructed per surgeon    [] No tobacco products within 24 hours of surgery     [x] No alcohol or illegal drug use within 24 hours of surgery.     [x] Jewelry, body piercing's, eyeglasses, contact lenses and dentures are not permitted into surgery (bring cases)      [x] Please do not wear any nail polish or make up on the day of surgery    [] If not already done, you can expect a call from registration    [x] If surgeon requests a time change you will be notified the day prior to surgery    [x] If you receive a survey after surgery we would greatly appreciate your comments    [] Parent/guardian of a minor must accompany their child and remain on the premises  the entire time they are under our care     [] Pediatric patients may bring favorite toy, blanket or comfort item with them    [] A caregiver or family member must remain with the patient during their stay if they are mentally handicapped, have dementia, disoriented or unable to use a call light or would be a safety concern if left unattended    [x] Please notify surgeon if you develop any illness between now and time of surgery (cold, cough, sore throat, fever, nausea, vomiting) or any signs of infections  including skin, wounds, and dental.    [] Other instructions    EDUCATIONAL MATERIALS PROVIDED:    [] PAT Preoperative Education Packet/Booklet     [] Medication List    [] Fluoroscopy Information Pamphlet    [] Transfusion bracelet applied with instructions    [] Joint replacement video reviewed    [] Shower with antibacterial soap and use CHG wipes provided the evening before surgery as instructed        Have you been tested for COVID  Yes           Have you been told you were positive for COVID No  Have you had any known exposure to someone that is positive for COVID No  Do you have a cough                   No              Do you have shortness of breath No                 Do you have a sore throat            No                Are you having chills                    No                Are you having muscle aches. No                    Please come to the hospital wearing a mask and have your significant other wear a mask as well. Both of you should check your temperature before leaving to come here,  if it is 100 or higher please call [phone] for instruction.

## 2020-11-13 ENCOUNTER — HOSPITAL ENCOUNTER (OUTPATIENT)
Dept: GENERAL RADIOLOGY | Age: 65
Setting detail: OUTPATIENT SURGERY
Discharge: HOME | End: 2020-11-15
Attending: SURGERY
Payer: COMMERCIAL

## 2020-11-13 ENCOUNTER — ANESTHESIA (OUTPATIENT)
Dept: OPERATING ROOM | Age: 65
End: 2020-11-13
Payer: COMMERCIAL

## 2020-11-13 ENCOUNTER — HOSPITAL ENCOUNTER (OUTPATIENT)
Age: 65
Setting detail: OUTPATIENT SURGERY
Discharge: HOME | End: 2020-11-13
Attending: SURGERY | Admitting: SURGERY
Payer: COMMERCIAL

## 2020-11-13 ENCOUNTER — APPOINTMENT (OUTPATIENT)
Dept: GENERAL RADIOLOGY | Age: 65
End: 2020-11-13
Attending: SURGERY
Payer: COMMERCIAL

## 2020-11-13 VITALS
DIASTOLIC BLOOD PRESSURE: 88 MMHG | SYSTOLIC BLOOD PRESSURE: 167 MMHG | BODY MASS INDEX: 26.22 KG/M2 | OXYGEN SATURATION: 96 % | RESPIRATION RATE: 16 BRPM | HEART RATE: 65 BPM | WEIGHT: 173 LBS | HEIGHT: 68 IN | TEMPERATURE: 97.2 F

## 2020-11-13 VITALS
RESPIRATION RATE: 14 BRPM | DIASTOLIC BLOOD PRESSURE: 65 MMHG | SYSTOLIC BLOOD PRESSURE: 125 MMHG | OXYGEN SATURATION: 97 %

## 2020-11-13 PROCEDURE — 7100000010 HC PHASE II RECOVERY - FIRST 15 MIN: Performed by: SURGERY

## 2020-11-13 PROCEDURE — 3700000000 HC ANESTHESIA ATTENDED CARE: Performed by: SURGERY

## 2020-11-13 PROCEDURE — 2709999900 HC NON-CHARGEABLE SUPPLY: Performed by: SURGERY

## 2020-11-13 PROCEDURE — 71045 X-RAY EXAM CHEST 1 VIEW: CPT

## 2020-11-13 PROCEDURE — 2500000003 HC RX 250 WO HCPCS

## 2020-11-13 PROCEDURE — 2580000003 HC RX 258: Performed by: SURGERY

## 2020-11-13 PROCEDURE — 3600000002 HC SURGERY LEVEL 2 BASE: Performed by: SURGERY

## 2020-11-13 PROCEDURE — 7100000011 HC PHASE II RECOVERY - ADDTL 15 MIN: Performed by: SURGERY

## 2020-11-13 PROCEDURE — C1788 PORT, INDWELLING, IMP: HCPCS | Performed by: SURGERY

## 2020-11-13 PROCEDURE — 3209999900 FLUORO FOR SURGICAL PROCEDURES

## 2020-11-13 PROCEDURE — 2500000003 HC RX 250 WO HCPCS: Performed by: SURGERY

## 2020-11-13 PROCEDURE — 6360000002 HC RX W HCPCS

## 2020-11-13 PROCEDURE — 3700000001 HC ADD 15 MINUTES (ANESTHESIA): Performed by: SURGERY

## 2020-11-13 PROCEDURE — 6360000002 HC RX W HCPCS: Performed by: SURGERY

## 2020-11-13 PROCEDURE — 3600000012 HC SURGERY LEVEL 2 ADDTL 15MIN: Performed by: SURGERY

## 2020-11-13 DEVICE — PORT IMPL INFUSION 8 FRX66 CM CATH SMARTPORT
Type: IMPLANTABLE DEVICE | Site: SUBCLAVIAN | Status: FUNCTIONAL
Removed: 2020-12-05

## 2020-11-13 RX ORDER — LIDOCAINE HYDROCHLORIDE 20 MG/ML
INJECTION, SOLUTION EPIDURAL; INFILTRATION; INTRACAUDAL; PERINEURAL PRN
Status: DISCONTINUED | OUTPATIENT
Start: 2020-11-13 | End: 2020-11-13 | Stop reason: SDUPTHER

## 2020-11-13 RX ORDER — PROPOFOL 10 MG/ML
INJECTION, EMULSION INTRAVENOUS PRN
Status: DISCONTINUED | OUTPATIENT
Start: 2020-11-13 | End: 2020-11-13 | Stop reason: SDUPTHER

## 2020-11-13 RX ORDER — SODIUM CHLORIDE 0.9 % (FLUSH) 0.9 %
10 SYRINGE (ML) INJECTION EVERY 12 HOURS SCHEDULED
Status: DISCONTINUED | OUTPATIENT
Start: 2020-11-13 | End: 2020-11-13 | Stop reason: HOSPADM

## 2020-11-13 RX ORDER — HYDROCODONE BITARTRATE AND ACETAMINOPHEN 5; 325 MG/1; MG/1
1 TABLET ORAL EVERY 6 HOURS PRN
Qty: 12 TABLET | Refills: 0 | Status: SHIPPED | OUTPATIENT
Start: 2020-11-13 | End: 2020-11-16

## 2020-11-13 RX ORDER — MEPERIDINE HYDROCHLORIDE 25 MG/ML
12.5 INJECTION INTRAMUSCULAR; INTRAVENOUS; SUBCUTANEOUS EVERY 5 MIN PRN
Status: DISCONTINUED | OUTPATIENT
Start: 2020-11-13 | End: 2020-11-13 | Stop reason: HOSPADM

## 2020-11-13 RX ORDER — SODIUM CHLORIDE, SODIUM LACTATE, POTASSIUM CHLORIDE, CALCIUM CHLORIDE 600; 310; 30; 20 MG/100ML; MG/100ML; MG/100ML; MG/100ML
INJECTION, SOLUTION INTRAVENOUS CONTINUOUS
Status: DISCONTINUED | OUTPATIENT
Start: 2020-11-13 | End: 2020-11-13 | Stop reason: HOSPADM

## 2020-11-13 RX ORDER — MIDAZOLAM HYDROCHLORIDE 1 MG/ML
INJECTION INTRAMUSCULAR; INTRAVENOUS PRN
Status: DISCONTINUED | OUTPATIENT
Start: 2020-11-13 | End: 2020-11-13 | Stop reason: SDUPTHER

## 2020-11-13 RX ORDER — DOCUSATE SODIUM 100 MG/1
100 CAPSULE, LIQUID FILLED ORAL 2 TIMES DAILY
Qty: 50 CAPSULE | Refills: 0 | Status: SHIPPED | OUTPATIENT
Start: 2020-11-13 | End: 2020-12-29

## 2020-11-13 RX ORDER — PROMETHAZINE HYDROCHLORIDE 25 MG/ML
6.25 INJECTION, SOLUTION INTRAMUSCULAR; INTRAVENOUS
Status: DISCONTINUED | OUTPATIENT
Start: 2020-11-13 | End: 2020-11-13 | Stop reason: HOSPADM

## 2020-11-13 RX ORDER — FENTANYL CITRATE 50 UG/ML
50 INJECTION, SOLUTION INTRAMUSCULAR; INTRAVENOUS EVERY 5 MIN PRN
Status: DISCONTINUED | OUTPATIENT
Start: 2020-11-13 | End: 2020-11-13 | Stop reason: HOSPADM

## 2020-11-13 RX ORDER — GLYCOPYRROLATE 1 MG/5 ML
SYRINGE (ML) INTRAVENOUS PRN
Status: DISCONTINUED | OUTPATIENT
Start: 2020-11-13 | End: 2020-11-13 | Stop reason: SDUPTHER

## 2020-11-13 RX ORDER — OXYCODONE HYDROCHLORIDE AND ACETAMINOPHEN 5; 325 MG/1; MG/1
1 TABLET ORAL
Status: DISCONTINUED | OUTPATIENT
Start: 2020-11-13 | End: 2020-11-13 | Stop reason: HOSPADM

## 2020-11-13 RX ORDER — ONDANSETRON 4 MG/1
4 TABLET, FILM COATED ORAL DAILY PRN
Qty: 12 TABLET | Refills: 0 | Status: SHIPPED | OUTPATIENT
Start: 2020-11-13 | End: 2020-11-25

## 2020-11-13 RX ORDER — LIDOCAINE HYDROCHLORIDE 10 MG/ML
INJECTION, SOLUTION INFILTRATION; PERINEURAL PRN
Status: DISCONTINUED | OUTPATIENT
Start: 2020-11-13 | End: 2020-11-13 | Stop reason: ALTCHOICE

## 2020-11-13 RX ORDER — PROPOFOL 10 MG/ML
INJECTION, EMULSION INTRAVENOUS CONTINUOUS PRN
Status: DISCONTINUED | OUTPATIENT
Start: 2020-11-13 | End: 2020-11-13 | Stop reason: SDUPTHER

## 2020-11-13 RX ORDER — FENTANYL CITRATE 50 UG/ML
25 INJECTION, SOLUTION INTRAMUSCULAR; INTRAVENOUS EVERY 5 MIN PRN
Status: DISCONTINUED | OUTPATIENT
Start: 2020-11-13 | End: 2020-11-13 | Stop reason: HOSPADM

## 2020-11-13 RX ORDER — FENTANYL CITRATE 50 UG/ML
INJECTION, SOLUTION INTRAMUSCULAR; INTRAVENOUS PRN
Status: DISCONTINUED | OUTPATIENT
Start: 2020-11-13 | End: 2020-11-13 | Stop reason: SDUPTHER

## 2020-11-13 RX ORDER — DIPHENHYDRAMINE HYDROCHLORIDE 50 MG/ML
12.5 INJECTION INTRAMUSCULAR; INTRAVENOUS
Status: DISCONTINUED | OUTPATIENT
Start: 2020-11-13 | End: 2020-11-13 | Stop reason: HOSPADM

## 2020-11-13 RX ADMIN — Medication 0.1 MG: at 10:49

## 2020-11-13 RX ADMIN — PROPOFOL 50 MG: 10 INJECTION, EMULSION INTRAVENOUS at 10:45

## 2020-11-13 RX ADMIN — PROPOFOL 50 MCG/KG/MIN: 10 INJECTION, EMULSION INTRAVENOUS at 10:44

## 2020-11-13 RX ADMIN — Medication 2 G: at 10:39

## 2020-11-13 RX ADMIN — MIDAZOLAM 2 MG: 1 INJECTION INTRAMUSCULAR; INTRAVENOUS at 10:39

## 2020-11-13 RX ADMIN — PROPOFOL 20 MG: 10 INJECTION, EMULSION INTRAVENOUS at 10:47

## 2020-11-13 RX ADMIN — LIDOCAINE HYDROCHLORIDE 40 MG: 20 INJECTION, SOLUTION EPIDURAL; INFILTRATION; INTRACAUDAL; PERINEURAL at 10:50

## 2020-11-13 RX ADMIN — FENTANYL CITRATE 50 MCG: 50 INJECTION, SOLUTION INTRAMUSCULAR; INTRAVENOUS at 10:42

## 2020-11-13 RX ADMIN — SODIUM CHLORIDE, POTASSIUM CHLORIDE, SODIUM LACTATE AND CALCIUM CHLORIDE: 600; 310; 30; 20 INJECTION, SOLUTION INTRAVENOUS at 10:31

## 2020-11-13 ASSESSMENT — PULMONARY FUNCTION TESTS
PIF_VALUE: 1
PIF_VALUE: 0
PIF_VALUE: 1
PIF_VALUE: 0
PIF_VALUE: 1
PIF_VALUE: 1

## 2020-11-13 ASSESSMENT — PAIN - FUNCTIONAL ASSESSMENT: PAIN_FUNCTIONAL_ASSESSMENT: 0-10

## 2020-11-13 NOTE — PROGRESS NOTES
Xray result satisfactory. Okay to discharge per surgical resident. Discharge instruction provided to patient and , verbalized understanding. VS stable. Denies pain.

## 2020-11-13 NOTE — OP NOTE
noted to be in the appropriate position at the [facility] and right atrium junction. The Mediport catheter was then cut the desired appropriate length after tunneling it through the skin to the port site. The port was then connected to the catheter. The port was then flushed and noted to flush and draw blood appropriately. The pocket was irrigated. Two vicryl sutures were then used to tack the port to the anterior chest wall. A 3-0 Vicryl was used to approximate the skin in a deep dermal layer and a 4-0 monocryl was used in a running subcuticular fashion to close the skin. The skin was cleaned and the incision was covered with dermabond. A postoperative x-ray was taken to assure catheter placement and to insure proper lung aeration and is still pending. The patient tolerated the procedure well. There were no complications. All sponge, needle, and instrument counts were correct.      Gianni Abraham MD

## 2020-11-13 NOTE — ANESTHESIA PRE PROCEDURE
Department of Anesthesiology  Preprocedure Note       Name:  Eddie Regan   Age:  60 y.o.  :  1955                                          MRN:  [id number]         Date:  2020      Surgeon: Shereen Velázquez):  Isha Borden MD    Procedure: Procedure(s):  POWER PORT INSERTION    Medications prior to admission:   Prior to Admission medications    Medication Sig Start Date End Date Taking? Authorizing Provider   metoprolol succinate (TOPROL XL) 50 MG extended release tablet TAKE 1 TABLET BY MOUTH TWICE A DAY 20  Yes Kari Pipe, DO   metoprolol succinate (TOPROL XL) 100 MG extended release tablet Take 1 tablet by mouth 2 times daily 20  Yes Kari Pipe, DO   lisinopril (PRINIVIL;ZESTRIL) 10 MG tablet Take 1 tablet by mouth 2 times daily 18  Yes Kari Pipe, DO   apixaban (ELIQUIS) 5 MG TABS tablet Take by mouth 2 times daily   Yes Historical Provider, MD   acetaminophen (TYLENOL) 325 MG tablet Take 2 tablets by mouth every 4 hours as needed for Pain 18  Yes Eleonora Gage MD   atorvastatin (LIPITOR) 40 MG tablet Take 1 tablet by mouth nightly 18  Yes Eleonora Gage MD   calcium carbonate (OSCAL) 500 MG TABS tablet Take 1 tablet by mouth 2 times daily 18  Yes Eleonora Gage MD       Current medications:    Current Facility-Administered Medications   Medication Dose Route Frequency Provider Last Rate Last Dose    ceFAZolin (ANCEF) 2 g in sterile water 20 mL IV syringe  2 g Intravenous On Call to [place] [address] Joshua Ellis MD        lactated ringers infusion   Intravenous Continuous Isha Borden MD        sodium chloride flush 0.9 % injection 10 mL  10 mL Intravenous 2 times per day Isha Borden MD           Allergies:     Allergies   Allergen Reactions    Adhesive Tape Rash       Problem List:    Patient Active Problem List   Diagnosis Code    TIA (transient ischemic attack) G45.9    Acute CVA (cerebrovascular accident) ([facility] [address] I63.9    Transient cerebral ischemia G45.9    PAF (paroxysmal atrial fibrillation) (HCC) I48.0       Past Medical History:        Diagnosis Date    Cancer ([facility] [address] 11/2020    Breast-left and lymph nodes     Hyperlipidemia     Hypertension     Osteoarthritis     Stroke due to embolism of right posterior cerebral artery (HCC)        Past Surgical History:        Procedure Laterality Date    CERVIX SURGERY      SAUL US PERQ DEVICE SOFT TISSUE PLMT  FIRST LESION  9/30/2020    SAUL US PERQ DEVICE SOFT TISSUE PLMT  FIRST LESION 9/30/2020 [facility]    TONSILLECTOMY      US BREAST NEEDLE BIOPSY LEFT  9/30/2020    US BREAST NEEDLE BIOPSY LEFT 9/30/2020 [facility]    WISDOM TOOTH EXTRACTION         Social History:    Social History     Tobacco Use    Smoking status: Never Smoker    Smokeless tobacco: Never Used   Substance Use Topics    Alcohol use: Yes     Comment: rare                                Counseling given: Not Answered      Vital Signs (Current):   Vitals:    11/12/20 1132 11/13/20 0858 11/13/20 0906   BP:   (!) 183/86   Pulse:   60   Resp:   20   Temp:  96.8 °F (36 °C) 96.4 °F (35.8 °C)   TempSrc:  Temporal Temporal   SpO2:   98%   Weight: 173 lb (78.5 kg)  173 lb (78.5 kg)   Height: 5' 8\" (1.727 m)  5' 8\" (1.727 m)                                              BP Readings from Last 3 Encounters:   11/13/20 (!) 183/86   10/31/19 112/80   07/16/19 (!) 148/86       NPO Status:                                                                                 BMI:   Wt Readings from Last 3 Encounters:   11/13/20 173 lb (78.5 kg)   10/31/19 171 lb (77.6 kg)   07/16/19 171 lb 6.4 oz (77.7 kg)     Body mass index is 26.3 kg/m².     CBC:   Lab Results   Component Value Date    WBC 7.4 11/18/2018    RBC 4.97 11/18/2018    HGB 14.1 11/18/2018    HCT 43.1 11/18/2018    MCV 86.7 11/18/2018    RDW 12.6 11/18/2018     11/18/2018       CMP:   Lab Results   Component Value Date     05/21/2018    K 4.2 05/21/2018     05/21/2018    CO2 24 05/21/2018    BUN 23 05/21/2018    CREATININE 0.9 05/21/2018    GFRAA >60 05/21/2018    LABGLOM >60 05/21/2018    GLUCOSE 100 05/21/2018    PROT 6.8 02/11/2018    CALCIUM 9.9 05/21/2018    BILITOT 0.4 02/11/2018    ALKPHOS 134 02/11/2018    AST 21 02/11/2018    ALT 30 02/11/2018       POC Tests: No results for input(s): POCGLU, POCNA, POCK, POCCL, POCBUN, POCHEMO, POCHCT in the last 72 hours. Coags:   Lab Results   Component Value Date    PROTIME 20.3 05/21/2018    INR 1.8 05/21/2018    APTT 38.9 05/21/2018       HCG (If Applicable): No results found for: PREGTESTUR, PREGSERUM, HCG, HCGQUANT     ABGs: No results found for: PHART, PO2ART, XPJ7PUR, YXJ3CKT, BEART, D0NFKPNR     Type & Screen (If Applicable):  No results found for: LABABO, LABRH    Drug/Infectious Status (If Applicable):  No results found for: HIV, HEPCAB    COVID-19 Screening (If Applicable):   Lab Results   Component Value Date    COVID19 Not Detected 11/10/2020         Anesthesia Evaluation  Patient summary reviewed no history of anesthetic complications:   Airway: Mallampati: II  TM distance: >3 FB     Mouth opening: > = 3 FB Dental:          Pulmonary:Negative Pulmonary ROS breath sounds clear to auscultation                             Cardiovascular:    (+) hypertension:, hyperlipidemia        Rhythm: regular  Rate: normal                    Neuro/Psych:   (+) CVA (2018 - no residual weakness;  on eliquis ):,    (-) TIA           GI/Hepatic/Renal: Neg GI/Hepatic/Renal ROS            Endo/Other:    (+) blood dyscrasia: anticoagulation therapy:., malignancy/cancer (left breast cancer). Abdominal:           Vascular: negative vascular ROS. Anesthesia Plan      MAC     ASA 3     (Backup GA if needed)  Induction: intravenous. Anesthetic plan and risks discussed with patient. Plan discussed with CRNA.                   Julio César Araya MD   11/13/2020

## 2020-11-13 NOTE — INTERVAL H&P NOTE
Update History & Physical    The patient's History and Physical of November 10, 2020 was reviewed with the patient and I examined the patient. There was no change. The surgical site was confirmed by the patient and me. Plan: The risks, benefits, expected outcome, and alternative to the recommended procedure have been discussed with the patient. Patient understands and wants to proceed with the procedure.      Electronically signed by Gianni Abraham MD on 11/13/2020 at 10:19 AM

## 2020-11-13 NOTE — PROGRESS NOTES
CLINICAL PHARMACY NOTE: MEDS TO [address] Select Patient?: No  Total # of Prescriptions Filled: 2   The following medications were delivered to the patient:  · norco5  · Ondansetron 4  Total # of Interventions Completed: 6  Time Spent (min): 45    Additional Documentation:

## 2020-11-13 NOTE — ANESTHESIA POSTPROCEDURE EVALUATION
Department of Anesthesiology  Postprocedure Note    Patient: Ysabel Burch  MRN: [id number]  YOB: 1955  Date of evaluation: 11/13/2020  Time:  3:49 PM     Procedure Summary     Date:  11/13/20 Room / Location:  [facility] OR 10 / [facility]    Anesthesia Start:  6729 Anesthesia Stop:  2122    Procedure:  POWER PORT INSERTION, RIGHT SUBCLAVIAN (N/A Chest) Diagnosis:  (BREAST CA)    Surgeon:  Gia Mclaughlin MD Responsible Provider:  Jennifer Duarte MD    Anesthesia Type:  MAC ASA Status:  3          Anesthesia Type: MAC    Esteban Phase I: Esteban Score: 9    Esteban Phase II: Esteban Score: 10    Last vitals: Reviewed and per EMR flowsheets.        Anesthesia Post Evaluation    Patient location during evaluation: PACU  Patient participation: complete - patient participated  Level of consciousness: awake  Airway patency: patent  Nausea & Vomiting: no vomiting and no nausea  Complications: no  Cardiovascular status: hemodynamically stable  Respiratory status: acceptable  Hydration status: stable

## 2020-12-03 ENCOUNTER — HOSPITAL ENCOUNTER (INPATIENT)
Age: 65
LOS: 5 days | Discharge: HOME HEALTH | DRG: 314 | End: 2020-12-08
Attending: EMERGENCY MEDICINE | Admitting: INTERNAL MEDICINE
Payer: COMMERCIAL

## 2020-12-03 ENCOUNTER — APPOINTMENT (OUTPATIENT)
Dept: GENERAL RADIOLOGY | Age: 65
DRG: 314 | End: 2020-12-03
Payer: COMMERCIAL

## 2020-12-03 PROBLEM — N17.9 AKI (ACUTE KIDNEY INJURY) (HCC): Status: ACTIVE | Noted: 2020-12-03

## 2020-12-03 PROBLEM — D72.829 LEUKOCYTOSIS: Status: ACTIVE | Noted: 2020-12-03

## 2020-12-03 PROBLEM — T45.1X5A CHEMOTHERAPY ADVERSE REACTION: Status: ACTIVE | Noted: 2020-12-03

## 2020-12-03 PROBLEM — C50.919 BREAST CA (HCC): Status: ACTIVE | Noted: 2020-12-03

## 2020-12-03 LAB
ADENOVIRUS BY PCR: NOT DETECTED
ALBUMIN SERPL-MCNC: 3.2 G/DL (ref 3.5–5.2)
ALP BLD-CCNC: 93 U/L (ref 35–104)
ALT SERPL-CCNC: 47 U/L (ref 0–32)
ANION GAP SERPL CALCULATED.3IONS-SCNC: 20 MMOL/L (ref 7–16)
APTT: 24.8 SEC (ref 24.5–35.1)
AST SERPL-CCNC: 59 U/L (ref 0–31)
ATYPICAL LYMPHOCYTE RELATIVE PERCENT: 0.9 % (ref 0–4)
BACTERIA: ABNORMAL /HPF
BASOPHILS ABSOLUTE: 0 E9/L (ref 0–0.2)
BASOPHILS RELATIVE PERCENT: 0 % (ref 0–2)
BILIRUB SERPL-MCNC: 0.6 MG/DL (ref 0–1.2)
BILIRUBIN URINE: NEGATIVE
BLOOD, URINE: NEGATIVE
BORDETELLA PARAPERTUSSIS BY PCR: NOT DETECTED
BORDETELLA PERTUSSIS BY PCR: NOT DETECTED
BUN BLDV-MCNC: 93 MG/DL (ref 8–23)
BURR CELLS: ABNORMAL
CALCIUM SERPL-MCNC: 9.1 MG/DL (ref 8.6–10.2)
CHLAMYDOPHILIA PNEUMONIAE BY PCR: NOT DETECTED
CHLORIDE BLD-SCNC: 99 MMOL/L (ref 98–107)
CLARITY: CLEAR
CO2: 21 MMOL/L (ref 22–29)
COLOR: YELLOW
CORONAVIRUS 229E BY PCR: NOT DETECTED
CORONAVIRUS HKU1 BY PCR: NOT DETECTED
CORONAVIRUS NL63 BY PCR: NOT DETECTED
CORONAVIRUS OC43 BY PCR: NOT DETECTED
CREAT SERPL-MCNC: 1.7 MG/DL (ref 0.5–1)
CRYSTALS, UA: ABNORMAL /HPF
EKG ATRIAL RATE: 192 BPM
EKG Q-T INTERVAL: 230 MS
EKG QRS DURATION: 80 MS
EKG QTC CALCULATION (BAZETT): 415 MS
EKG R AXIS: 88 DEGREES
EKG T AXIS: 15 DEGREES
EKG VENTRICULAR RATE: 196 BPM
EOSINOPHILS ABSOLUTE: 0 E9/L (ref 0.05–0.5)
EOSINOPHILS RELATIVE PERCENT: 0 % (ref 0–6)
GFR AFRICAN AMERICAN: 37
GFR NON-AFRICAN AMERICAN: 30 ML/MIN/1.73
GLUCOSE BLD-MCNC: 185 MG/DL (ref 74–99)
GLUCOSE URINE: NEGATIVE MG/DL
HCT VFR BLD CALC: 49.2 % (ref 34–48)
HEMOGLOBIN: 15.7 G/DL (ref 11.5–15.5)
HUMAN METAPNEUMOVIRUS BY PCR: NOT DETECTED
HUMAN RHINOVIRUS/ENTEROVIRUS BY PCR: NOT DETECTED
INFLUENZA A BY PCR: NOT DETECTED
INFLUENZA B BY PCR: NOT DETECTED
INR BLD: 1.2
KETONES, URINE: NEGATIVE MG/DL
LACTIC ACID, SEPSIS: 1.4 MMOL/L (ref 0.5–1.9)
LACTIC ACID, SEPSIS: 3.7 MMOL/L (ref 0.5–1.9)
LEUKOCYTE ESTERASE, URINE: NEGATIVE
LYMPHOCYTES ABSOLUTE: 0.2 E9/L (ref 1.5–4)
LYMPHOCYTES RELATIVE PERCENT: 0 % (ref 20–42)
MCH RBC QN AUTO: 27.8 PG (ref 26–35)
MCHC RBC AUTO-ENTMCNC: 31.9 % (ref 32–34.5)
MCV RBC AUTO: 87.1 FL (ref 80–99.9)
MONOCYTES ABSOLUTE: 0.79 E9/L (ref 0.1–0.95)
MONOCYTES RELATIVE PERCENT: 4.3 % (ref 2–12)
MYCOPLASMA PNEUMONIAE BY PCR: NOT DETECTED
NEUTROPHILS ABSOLUTE: 18.72 E9/L (ref 1.8–7.3)
NEUTROPHILS RELATIVE PERCENT: 94.8 % (ref 43–80)
NITRITE, URINE: NEGATIVE
NUCLEATED RED BLOOD CELLS: 0 /100 WBC
OVALOCYTES: ABNORMAL
PARAINFLUENZA VIRUS 1 BY PCR: NOT DETECTED
PARAINFLUENZA VIRUS 2 BY PCR: NOT DETECTED
PARAINFLUENZA VIRUS 3 BY PCR: NOT DETECTED
PARAINFLUENZA VIRUS 4 BY PCR: NOT DETECTED
PDW BLD-RTO: 13.4 FL (ref 11.5–15)
PH UA: 6 (ref 5–9)
PLATELET # BLD: 118 E9/L (ref 130–450)
PMV BLD AUTO: 12.7 FL (ref 7–12)
POIKILOCYTES: ABNORMAL
POTASSIUM REFLEX MAGNESIUM: 3.7 MMOL/L (ref 3.5–5)
PROTEIN UA: NEGATIVE MG/DL
PROTHROMBIN TIME: 13.3 SEC (ref 9.3–12.4)
RBC # BLD: 5.65 E12/L (ref 3.5–5.5)
RBC UA: ABNORMAL /HPF (ref 0–2)
RESPIRATORY SYNCYTIAL VIRUS BY PCR: NOT DETECTED
SARS-COV-2, PCR: DETECTED
SODIUM BLD-SCNC: 140 MMOL/L (ref 132–146)
SPECIFIC GRAVITY UA: 1.02 (ref 1–1.03)
TOTAL PROTEIN: 6.8 G/DL (ref 6.4–8.3)
TOXIC GRANULATION: ABNORMAL
UROBILINOGEN, URINE: 0.2 E.U./DL
WBC # BLD: 19.7 E9/L (ref 4.5–11.5)
WBC UA: ABNORMAL /HPF (ref 0–5)

## 2020-12-03 PROCEDURE — 36415 COLL VENOUS BLD VENIPUNCTURE: CPT

## 2020-12-03 PROCEDURE — 87077 CULTURE AEROBIC IDENTIFY: CPT

## 2020-12-03 PROCEDURE — 85730 THROMBOPLASTIN TIME PARTIAL: CPT

## 2020-12-03 PROCEDURE — 6370000000 HC RX 637 (ALT 250 FOR IP): Performed by: INTERNAL MEDICINE

## 2020-12-03 PROCEDURE — 87088 URINE BACTERIA CULTURE: CPT

## 2020-12-03 PROCEDURE — 85610 PROTHROMBIN TIME: CPT

## 2020-12-03 PROCEDURE — 96375 TX/PRO/DX INJ NEW DRUG ADDON: CPT

## 2020-12-03 PROCEDURE — 2580000003 HC RX 258: Performed by: EMERGENCY MEDICINE

## 2020-12-03 PROCEDURE — 2500000003 HC RX 250 WO HCPCS: Performed by: EMERGENCY MEDICINE

## 2020-12-03 PROCEDURE — 71045 X-RAY EXAM CHEST 1 VIEW: CPT

## 2020-12-03 PROCEDURE — 96361 HYDRATE IV INFUSION ADD-ON: CPT

## 2020-12-03 PROCEDURE — 81001 URINALYSIS AUTO W/SCOPE: CPT

## 2020-12-03 PROCEDURE — 0202U NFCT DS 22 TRGT SARS-COV-2: CPT

## 2020-12-03 PROCEDURE — 87186 SC STD MICRODIL/AGAR DIL: CPT

## 2020-12-03 PROCEDURE — 83605 ASSAY OF LACTIC ACID: CPT

## 2020-12-03 PROCEDURE — 6360000002 HC RX W HCPCS: Performed by: EMERGENCY MEDICINE

## 2020-12-03 PROCEDURE — 2580000003 HC RX 258: Performed by: INTERNAL MEDICINE

## 2020-12-03 PROCEDURE — 2060000000 HC ICU INTERMEDIATE R&B

## 2020-12-03 PROCEDURE — 96374 THER/PROPH/DIAG INJ IV PUSH: CPT

## 2020-12-03 PROCEDURE — 93005 ELECTROCARDIOGRAM TRACING: CPT | Performed by: EMERGENCY MEDICINE

## 2020-12-03 PROCEDURE — 84145 PROCALCITONIN (PCT): CPT

## 2020-12-03 PROCEDURE — 99285 EMERGENCY DEPT VISIT HI MDM: CPT

## 2020-12-03 PROCEDURE — 85025 COMPLETE CBC W/AUTO DIFF WBC: CPT

## 2020-12-03 PROCEDURE — 80053 COMPREHEN METABOLIC PANEL: CPT

## 2020-12-03 PROCEDURE — 87040 BLOOD CULTURE FOR BACTERIA: CPT

## 2020-12-03 RX ORDER — ACETAMINOPHEN 325 MG/1
650 TABLET ORAL EVERY 6 HOURS PRN
Status: DISCONTINUED | OUTPATIENT
Start: 2020-12-03 | End: 2020-12-08 | Stop reason: HOSPADM

## 2020-12-03 RX ORDER — METOPROLOL SUCCINATE 50 MG/1
50 TABLET, EXTENDED RELEASE ORAL 2 TIMES DAILY
Status: DISCONTINUED | OUTPATIENT
Start: 2020-12-03 | End: 2020-12-08 | Stop reason: HOSPADM

## 2020-12-03 RX ORDER — SODIUM CHLORIDE 0.9 % (FLUSH) 0.9 %
10 SYRINGE (ML) INJECTION EVERY 12 HOURS SCHEDULED
Status: DISCONTINUED | OUTPATIENT
Start: 2020-12-03 | End: 2020-12-05 | Stop reason: SDUPTHER

## 2020-12-03 RX ORDER — DOCUSATE SODIUM 100 MG/1
100 CAPSULE, LIQUID FILLED ORAL 2 TIMES DAILY
Status: DISCONTINUED | OUTPATIENT
Start: 2020-12-03 | End: 2020-12-08 | Stop reason: HOSPADM

## 2020-12-03 RX ORDER — SODIUM CHLORIDE 0.9 % (FLUSH) 0.9 %
10 SYRINGE (ML) INJECTION PRN
Status: DISCONTINUED | OUTPATIENT
Start: 2020-12-03 | End: 2020-12-08 | Stop reason: HOSPADM

## 2020-12-03 RX ORDER — SODIUM CHLORIDE 0.9 % (FLUSH) 0.9 %
10 SYRINGE (ML) INJECTION EVERY 12 HOURS SCHEDULED
Status: DISCONTINUED | OUTPATIENT
Start: 2020-12-03 | End: 2020-12-08 | Stop reason: HOSPADM

## 2020-12-03 RX ORDER — PROMETHAZINE HYDROCHLORIDE 25 MG/1
12.5 TABLET ORAL EVERY 6 HOURS PRN
Status: DISCONTINUED | OUTPATIENT
Start: 2020-12-03 | End: 2020-12-08 | Stop reason: HOSPADM

## 2020-12-03 RX ORDER — DILTIAZEM HYDROCHLORIDE 5 MG/ML
20 INJECTION INTRAVENOUS ONCE
Status: COMPLETED | OUTPATIENT
Start: 2020-12-03 | End: 2020-12-03

## 2020-12-03 RX ORDER — 0.9 % SODIUM CHLORIDE 0.9 %
30 INTRAVENOUS SOLUTION INTRAVENOUS ONCE
Status: COMPLETED | OUTPATIENT
Start: 2020-12-03 | End: 2020-12-03

## 2020-12-03 RX ORDER — SODIUM CHLORIDE 0.9 % (FLUSH) 0.9 %
10 SYRINGE (ML) INJECTION PRN
Status: DISCONTINUED | OUTPATIENT
Start: 2020-12-03 | End: 2020-12-05 | Stop reason: SDUPTHER

## 2020-12-03 RX ORDER — ATORVASTATIN CALCIUM 40 MG/1
40 TABLET, FILM COATED ORAL NIGHTLY
Status: DISCONTINUED | OUTPATIENT
Start: 2020-12-03 | End: 2020-12-08 | Stop reason: HOSPADM

## 2020-12-03 RX ORDER — SODIUM CHLORIDE 9 MG/ML
INJECTION, SOLUTION INTRAVENOUS EVERY 12 HOURS
Status: DISCONTINUED | OUTPATIENT
Start: 2020-12-04 | End: 2020-12-05

## 2020-12-03 RX ORDER — CALCIUM CARBONATE 500(1250)
500 TABLET ORAL 2 TIMES DAILY
Status: DISCONTINUED | OUTPATIENT
Start: 2020-12-03 | End: 2020-12-08 | Stop reason: HOSPADM

## 2020-12-03 RX ORDER — DEXAMETHASONE 4 MG/1
4 TABLET ORAL SEE ADMIN INSTRUCTIONS
Status: ON HOLD | COMMUNITY
End: 2021-01-01 | Stop reason: HOSPADM

## 2020-12-03 RX ORDER — ONDANSETRON HYDROCHLORIDE 8 MG/1
8 TABLET, FILM COATED ORAL EVERY 8 HOURS PRN
COMMUNITY
End: 2022-02-01

## 2020-12-03 RX ORDER — ACETAMINOPHEN 650 MG/1
650 SUPPOSITORY RECTAL EVERY 6 HOURS PRN
Status: DISCONTINUED | OUTPATIENT
Start: 2020-12-03 | End: 2020-12-08 | Stop reason: HOSPADM

## 2020-12-03 RX ORDER — ONDANSETRON 2 MG/ML
4 INJECTION INTRAMUSCULAR; INTRAVENOUS EVERY 6 HOURS PRN
Status: DISCONTINUED | OUTPATIENT
Start: 2020-12-03 | End: 2020-12-08 | Stop reason: HOSPADM

## 2020-12-03 RX ORDER — SODIUM CHLORIDE 9 MG/ML
INJECTION, SOLUTION INTRAVENOUS CONTINUOUS
Status: DISCONTINUED | OUTPATIENT
Start: 2020-12-03 | End: 2020-12-04

## 2020-12-03 RX ORDER — LISINOPRIL 10 MG/1
10 TABLET ORAL 2 TIMES DAILY
Status: DISCONTINUED | OUTPATIENT
Start: 2020-12-03 | End: 2020-12-08 | Stop reason: HOSPADM

## 2020-12-03 RX ADMIN — VANCOMYCIN HYDROCHLORIDE 2000 MG: 10 INJECTION, POWDER, LYOPHILIZED, FOR SOLUTION INTRAVENOUS at 18:59

## 2020-12-03 RX ADMIN — METOPROLOL SUCCINATE 50 MG: 50 TABLET, FILM COATED, EXTENDED RELEASE ORAL at 22:26

## 2020-12-03 RX ADMIN — APIXABAN 5 MG: 5 TABLET, FILM COATED ORAL at 22:26

## 2020-12-03 RX ADMIN — DILTIAZEM HYDROCHLORIDE 20 MG: 5 INJECTION INTRAVENOUS at 13:40

## 2020-12-03 RX ADMIN — DEXTROSE MONOHYDRATE 5 MG/HR: 50 INJECTION, SOLUTION INTRAVENOUS at 13:55

## 2020-12-03 RX ADMIN — SODIUM CHLORIDE, PRESERVATIVE FREE 10 ML: 5 INJECTION INTRAVENOUS at 22:26

## 2020-12-03 RX ADMIN — SODIUM CHLORIDE, PRESERVATIVE FREE 10 ML: 5 INJECTION INTRAVENOUS at 22:27

## 2020-12-03 RX ADMIN — SODIUM CHLORIDE: 9 INJECTION, SOLUTION INTRAVENOUS at 22:27

## 2020-12-03 RX ADMIN — CEFEPIME 2 G: 2 INJECTION, POWDER, FOR SOLUTION INTRAVENOUS at 18:10

## 2020-12-03 RX ADMIN — SODIUM CHLORIDE 2355 ML: 9 INJECTION, SOLUTION INTRAVENOUS at 12:40

## 2020-12-03 RX ADMIN — DOCUSATE SODIUM 100 MG: 100 CAPSULE, LIQUID FILLED ORAL at 22:26

## 2020-12-03 RX ADMIN — ATORVASTATIN CALCIUM 40 MG: 40 TABLET, FILM COATED ORAL at 22:26

## 2020-12-03 RX ADMIN — Medication 500 MG: at 22:26

## 2020-12-03 RX ADMIN — LISINOPRIL 10 MG: 10 TABLET ORAL at 22:27

## 2020-12-03 ASSESSMENT — ENCOUNTER SYMPTOMS
BACK PAIN: 0
WHEEZING: 0
EYE REDNESS: 0
SINUS PRESSURE: 0
NAUSEA: 1
ABDOMINAL DISTENTION: 0
DIARRHEA: 0
SORE THROAT: 0
SHORTNESS OF BREATH: 0
VOMITING: 1
EYE PAIN: 0
COUGH: 0
EYE DISCHARGE: 0
ABDOMINAL PAIN: 0

## 2020-12-03 NOTE — ED PROVIDER NOTES
Department of Emergency Medicine   ED  Provider Note  Admit Date/RoomTime: 12/3/2020 12:07 PM  ED Room: 08/08              12/3/20  12:28 PM EST    History of Present Illness:   Bhavesh Smith is a 60 y.o. female presenting to the ED for vomiting, beginning 2 weeks ago since chemotherapy. The complaint has been persistent, severe in severity, and worsened by nothing. Reports poor oral intake due to nausea and vomiting since getting chemotherapy on 11/20. Reports using her nausea medication only partial relief. She called her doctor who told to come here for IV fluids. In addition patient had a known Covid exposure on 11/20 by her brother who drove her to chemotherapy. She has not had any Covid-like symptoms herself but states she needs another Covid test before she is allowed back at chemotherapy. She is on chemotherapy for the breast cancer. Addition she is a history of atrial fibrillation for which he takes metoprolol and Eliquis. She denies chest pain, palpitations, cough, shortness of breath, fever or any other complaints at this time. She reports she only presented here today for COVID-19 testing so that she may resume chemotherapy and fluids. Review of Systems:   Pertinent positives and negatives are stated within HPI, all other systems reviewed and are negative. Review of Systems   Constitutional: Negative for chills and fever. HENT: Negative for ear pain, sinus pressure and sore throat. Eyes: Negative for pain, discharge and redness. Respiratory: Negative for cough, shortness of breath and wheezing. Cardiovascular: Negative for chest pain. Gastrointestinal: Positive for nausea and vomiting. Negative for abdominal distention, abdominal pain and diarrhea. Genitourinary: Negative for dysuria and frequency. Musculoskeletal: Negative for arthralgias and back pain. Skin: Negative for rash and wound. Neurological: Negative for weakness and headaches.    Hematological: Heart sounds: Normal heart sounds. No murmur. Pulmonary:      Effort: Pulmonary effort is normal. No respiratory distress. Breath sounds: Normal breath sounds. No wheezing or rales. Abdominal:      General: Bowel sounds are normal.      Palpations: Abdomen is soft. Tenderness: There is no abdominal tenderness. There is no guarding or rebound. Skin:     General: Skin is warm and dry. Neurological:      Mental Status: She is alert and oriented to person, place, and time. Cranial Nerves: No cranial nerve deficit. Coordination: Coordination normal.            -------------------------------------------------- RESULTS -------------------------------------------------  All laboratory and radiology results have been personally reviewed by myself   LABS:  No results found for this visit on 12/03/20. RADIOLOGY:  Interpreted by Radiologist.  XR CHEST PORTABLE    (Results Pending)       EKG: This EKG is signed by emergency department physician. Rate: 196  Rhythm: Atrial fibrillation RVR  Interpretation: atrial fibrillation (chronic) RVR  Comparison: changes compared to previous EKG 10/31/19      ------------------------------ ED COURSE/MEDICAL DECISION MAKING----------------------  Medications   sodium chloride flush 0.9 % injection 10 mL (has no administration in time range)   sodium chloride flush 0.9 % injection 10 mL (has no administration in time range)   0.9 % sodium chloride bolus (has no administration in time range)       ED Course as of Dec 08 1420   Thu Dec 03, 2020   1503 Patient feeling better. Patient admits to some laryngitis, she reports she is been losing her voice the past few days but denies any actual sore throat or any other complaints. []   1739 No clear source of infection with negative chest x-ray and negative urinalysis however patient is leukocytosis with SARAN elevated lactate concerning for septicemia.   Unsure if this is a spontaneous bacteremia or a CLABSI from her [place]. Will start broad-spectrum antibiotics to cover these infections    [MF]   1848 Discussed case with Dr. Sania Manzanares - he will admit patient. [MF]      ED Course User Index  [MF] Victorina Enriquez, DO          Please note that the withdrawal or failure to initiate urgent interventions for this patient would likely result in a life threatening deterioration or permanent disability. Systems at risk for deterioration include: cardiovascular    Accordingly this patient received 30 minutes of critical care time, excluding separately billable procedures. Medical Decision Making:    16-year-old female who resents for evaluation of dehydration, emesis following chemotherapy for breast cancer. Patient not been taking medications. To the vomiting causing her atrial fibrillation to go into RVR. Patient given IV fluids for hypotension prior to starting Cardizem bolus and infusion to control her RVR. Patient also concerning for septicemia with no obvious infection source. Patient was started on empiric antibiotics for possible Klebsiella. SARAN likely secondary to dehydration second or severe sepsis    Counseling: The emergency provider has spoken with the patient and discussed todays results, in addition to providing specific details for the plan of care and counseling regarding the diagnosis and prognosis. Questions are answered at this time and they are agreeable with the plan.      --------------------------------- IMPRESSION AND DISPOSITION ---------------------------------    IMPRESSION  1. Atrial fibrillation with RVR ([facility] [address]    2. SARAN (acute kidney injury) ([facility] [address]    3. [facility]ic[facility] ([facility]    4. Dehydration        DISPOSITION  Disposition: Admit to telemetry  Patient condition is serious      NOTE: This report was transcribed using voice recognition software.  Effort was made to ensure accuracy; however, inadvertent computerized transcription errors may be present         Victorina Enriquez, DO  12/08/20 1422

## 2020-12-04 PROBLEM — I48.91 ATRIAL FIBRILLATION WITH RVR (HCC): Status: ACTIVE | Noted: 2020-12-04

## 2020-12-04 PROBLEM — R65.21 SEPTIC SHOCK WITH ACUTE ORGAN DYSFUNCTION DUE TO GRAM POSITIVE COCCI (HCC): Status: ACTIVE | Noted: 2020-12-04

## 2020-12-04 PROBLEM — U07.1 COVID-19: Status: ACTIVE | Noted: 2020-12-04

## 2020-12-04 PROBLEM — A41.89 SEPTIC SHOCK WITH ACUTE ORGAN DYSFUNCTION DUE TO GRAM POSITIVE COCCI (HCC): Status: ACTIVE | Noted: 2020-12-04

## 2020-12-04 LAB
ALBUMIN SERPL-MCNC: 2.6 G/DL (ref 3.5–5.2)
ANION GAP SERPL CALCULATED.3IONS-SCNC: 10 MMOL/L (ref 7–16)
BUN BLDV-MCNC: 58 MG/DL (ref 8–23)
C-REACTIVE PROTEIN: 4.7 MG/DL (ref 0–0.4)
CALCIUM SERPL-MCNC: 8.6 MG/DL (ref 8.6–10.2)
CHLORIDE BLD-SCNC: 109 MMOL/L (ref 98–107)
CO2: 24 MMOL/L (ref 22–29)
CREAT SERPL-MCNC: 1 MG/DL (ref 0.5–1)
CREATININE URINE: 114 MG/DL (ref 29–226)
GFR AFRICAN AMERICAN: >60
GFR NON-AFRICAN AMERICAN: 56 ML/MIN/1.73
GLUCOSE BLD-MCNC: 127 MG/DL (ref 74–99)
PHOSPHORUS: 2.4 MG/DL (ref 2.5–4.5)
POTASSIUM SERPL-SCNC: 3.6 MMOL/L (ref 3.5–5)
PROCALCITONIN: 0.35 NG/ML (ref 0–0.08)
PROCALCITONIN: 0.63 NG/ML (ref 0–0.08)
SEDIMENTATION RATE, ERYTHROCYTE: 39 MM/HR (ref 0–20)
SODIUM BLD-SCNC: 143 MMOL/L (ref 132–146)
SODIUM URINE: <20 MMOL/L
UREA NITROGEN, UR: 1386 MG/DL (ref 800–1666)

## 2020-12-04 PROCEDURE — 86140 C-REACTIVE PROTEIN: CPT

## 2020-12-04 PROCEDURE — 82570 ASSAY OF URINE CREATININE: CPT

## 2020-12-04 PROCEDURE — 97161 PT EVAL LOW COMPLEX 20 MIN: CPT

## 2020-12-04 PROCEDURE — 2580000003 HC RX 258: Performed by: SPECIALIST

## 2020-12-04 PROCEDURE — 84145 PROCALCITONIN (PCT): CPT

## 2020-12-04 PROCEDURE — 92526 ORAL FUNCTION THERAPY: CPT | Performed by: SPEECH-LANGUAGE PATHOLOGIST

## 2020-12-04 PROCEDURE — 36415 COLL VENOUS BLD VENIPUNCTURE: CPT

## 2020-12-04 PROCEDURE — 97535 SELF CARE MNGMENT TRAINING: CPT

## 2020-12-04 PROCEDURE — 87040 BLOOD CULTURE FOR BACTERIA: CPT

## 2020-12-04 PROCEDURE — 6370000000 HC RX 637 (ALT 250 FOR IP): Performed by: INTERNAL MEDICINE

## 2020-12-04 PROCEDURE — 97165 OT EVAL LOW COMPLEX 30 MIN: CPT

## 2020-12-04 PROCEDURE — 51798 US URINE CAPACITY MEASURE: CPT

## 2020-12-04 PROCEDURE — 6360000002 HC RX W HCPCS: Performed by: INTERNAL MEDICINE

## 2020-12-04 PROCEDURE — 97530 THERAPEUTIC ACTIVITIES: CPT

## 2020-12-04 PROCEDURE — 92610 EVALUATE SWALLOWING FUNCTION: CPT | Performed by: SPEECH-LANGUAGE PATHOLOGIST

## 2020-12-04 PROCEDURE — 80069 RENAL FUNCTION PANEL: CPT

## 2020-12-04 PROCEDURE — 84540 ASSAY OF URINE/UREA-N: CPT

## 2020-12-04 PROCEDURE — 2580000003 HC RX 258: Performed by: INTERNAL MEDICINE

## 2020-12-04 PROCEDURE — 6360000002 HC RX W HCPCS: Performed by: SPECIALIST

## 2020-12-04 PROCEDURE — 2060000000 HC ICU INTERMEDIATE R&B

## 2020-12-04 PROCEDURE — 84300 ASSAY OF URINE SODIUM: CPT

## 2020-12-04 PROCEDURE — 85651 RBC SED RATE NONAUTOMATED: CPT

## 2020-12-04 PROCEDURE — 2500000003 HC RX 250 WO HCPCS: Performed by: EMERGENCY MEDICINE

## 2020-12-04 RX ORDER — SODIUM CHLORIDE 9 MG/ML
INJECTION, SOLUTION INTRAVENOUS CONTINUOUS
Status: DISCONTINUED | OUTPATIENT
Start: 2020-12-04 | End: 2020-12-06

## 2020-12-04 RX ORDER — DIGOXIN 0.25 MG/ML
125 INJECTION INTRAMUSCULAR; INTRAVENOUS DAILY
Status: DISCONTINUED | OUTPATIENT
Start: 2020-12-04 | End: 2020-12-05

## 2020-12-04 RX ADMIN — DEXTROSE MONOHYDRATE 13.5 MG/HR: 50 INJECTION, SOLUTION INTRAVENOUS at 04:42

## 2020-12-04 RX ADMIN — METOPROLOL SUCCINATE 50 MG: 50 TABLET, FILM COATED, EXTENDED RELEASE ORAL at 22:00

## 2020-12-04 RX ADMIN — LISINOPRIL 10 MG: 10 TABLET ORAL at 22:00

## 2020-12-04 RX ADMIN — APIXABAN 5 MG: 5 TABLET, FILM COATED ORAL at 22:00

## 2020-12-04 RX ADMIN — CEFEPIME HYDROCHLORIDE 1 G: 1 INJECTION, POWDER, FOR SOLUTION INTRAMUSCULAR; INTRAVENOUS at 05:35

## 2020-12-04 RX ADMIN — ATORVASTATIN CALCIUM 40 MG: 40 TABLET, FILM COATED ORAL at 22:00

## 2020-12-04 RX ADMIN — DAPTOMYCIN 450 MG: 500 INJECTION, POWDER, LYOPHILIZED, FOR SOLUTION INTRAVENOUS at 15:38

## 2020-12-04 RX ADMIN — APIXABAN 5 MG: 5 TABLET, FILM COATED ORAL at 09:54

## 2020-12-04 RX ADMIN — SODIUM CHLORIDE, PRESERVATIVE FREE 10 ML: 5 INJECTION INTRAVENOUS at 09:54

## 2020-12-04 RX ADMIN — DIGOXIN 125 MCG: 0.25 INJECTION INTRAMUSCULAR; INTRAVENOUS at 12:25

## 2020-12-04 RX ADMIN — Medication 500 MG: at 22:00

## 2020-12-04 RX ADMIN — SODIUM CHLORIDE, PRESERVATIVE FREE 10 ML: 5 INJECTION INTRAVENOUS at 22:00

## 2020-12-04 RX ADMIN — Medication 500 MG: at 09:54

## 2020-12-04 ASSESSMENT — PAIN SCALES - GENERAL
PAINLEVEL_OUTOF10: 0

## 2020-12-04 NOTE — PROGRESS NOTES
Pulse ox was 90-91% on room air at rest.  Ambulated patient on room air. Oxygen saturation was 87% on room air while ambulating. Oxygen applied. Recovery pulse ox was 90% on 3 liters of oxygen while ambulating.

## 2020-12-04 NOTE — H&P
Radames Elias M.D. History and Physical      CHIEF COMPLAINT: Weakness, generalized fatigue    Reason for Admission: COVID-19 in immunocompromised host on chemotherapy    History Obtained From: Patient/EMR    HISTORY OF PRESENT ILLNESS:      The patient is a 60 y.o. female of Rip Dao MD with significant past medical history of breast cancer currently undergoing chemotherapy, status post recent right-sided Mediport placement who presents with complaints of fatigue, lethargy, worsening functional status at home. Patient had an appointment with her oncologist who recommended evaluation in the emergency department secondary to patient's symptoms. She was found to be in atrial fibrillation with rapid ventricular rate in the ER and initiated on Cardizem drip. Patient was tested for COVID-19 and was positive. She subsequently admitted to a Covid telemetry floor. On my evaluation she indicates she feels about the same. Blood pressure systolic is 90 on Cardizem. She denies lightheadedness, chest pain, shortness of breath. She does feel chilled but does not complain of high fevers. She has had positive Covid exposure with her family who have been tested positive for the covid  virus.   BP (!) 93/53   Pulse 79   Temp 97.8 °F (36.6 °C) (Axillary)   Resp 18   Ht 5' 8\" (1.727 m)   Wt 173 lb (78.5 kg)   SpO2 90%   BMI 26.30 kg/m²   All labs personally reviewed-white count 20,000 in immunocompromised host, pro-Danny 0.35  All imaging personally reviewed chest x-ray is unrevealing    Past Medical History:        Diagnosis Date    Cancer ([address] 11/2020    Breast-left and lymph nodes     Hyperlipidemia     Hypertension     Osteoarthritis     Stroke due to embolism of right posterior cerebral artery ([address]      Past Surgical History:        Procedure Laterality Date    CERVIX SURGERY      SAUL US PERQ DEVICE SOFT TISSUE PLMT  FIRST LESION  9/30/2020    SAUL US PERQ DEVICE SOFT TISSUE PLMT  FIRST LESION 9/30/2020 [facility]    PORT SURGERY N/A 11/13/2020    POWER PORT INSERTION, RIGHT SUBCLAVIAN performed by Edilia Cat MD at [address] LEFT  9/30/2020     BREAST NEEDLE BIOPSY LEFT 9/30/2020 [facility]    WISDOM TOOTH EXTRACTION           Medications Prior to Admission:    Medications Prior to Admission: dexamethasone (DECADRON) 4 MG tablet, Take 4 mg by mouth See Admin Instructions Take 2 tabs day before, day of, and day after chemotherapy  docusate sodium (COLACE) 100 MG capsule, Take 1 capsule by mouth 2 times daily  metoprolol succinate (TOPROL XL) 50 MG extended release tablet, TAKE 1 TABLET BY MOUTH TWICE A DAY (Patient taking differently: Take 50 mg by mouth 2 times daily Takes along with a 100mg tablet for total of 150mg bid)  metoprolol succinate (TOPROL XL) 100 MG extended release tablet, Take 1 tablet by mouth 2 times daily (Patient taking differently: Take 100 mg by mouth 2 times daily Takes along with a 50mg for total of 150mg BID)  lisinopril (PRINIVIL;ZESTRIL) 10 MG tablet, Take 1 tablet by mouth 2 times daily (Patient taking differently: Take 20 mg by mouth 2 times daily )  apixaban (ELIQUIS) 5 MG TABS tablet, Take by mouth 2 times daily  atorvastatin (LIPITOR) 40 MG tablet, Take 1 tablet by mouth nightly  calcium carbonate (OSCAL) 500 MG TABS tablet, Take 1 tablet by mouth 2 times daily  ondansetron (ZOFRAN) 8 MG tablet, Take 8 mg by mouth every 8 hours as needed for Nausea or Vomiting States she has not needed to take recently. acetaminophen (TYLENOL) 325 MG tablet, Take 2 tablets by mouth every 4 hours as needed for Pain    Allergies:  Adhesive tape    Social History:   TOBACCO:   reports that she has never smoked. She has never used smokeless tobacco.  ETOH:   reports previous alcohol use.   MARITAL STATUS:    OCCUPATION:      Family History:       Problem Relation Age of Onset    Heart Disease Mother REVIEW OF SYSTEMS:    General ROS: positive for  - chills, fatigue, fever and malaise  Hematological and Lymphatic ROS: negative  Endocrine ROS: negative  Respiratory ROS: no cough, shortness of breath, or wheezing  Cardiovascular ROS: no chest pain or dyspnea on exertion  Gastrointestinal ROS: no abdominal pain, change in bowel habits, or black or bloody stools  Genito-Urinary ROS: no dysuria, trouble voiding, or hematuria  Neurological ROS: no TIA or stroke symptoms  negative    Vitals:  BP (!) 93/53   Pulse 79   Temp 97.8 °F (36.6 °C) (Axillary)   Resp 18   Ht 5' 8\" (1.727 m)   Wt 173 lb (78.5 kg)   SpO2 90%   BMI 26.30 kg/m²     PHYSICAL EXAM:  General:  Awake, alert, oriented X 3. Well developed, well nourished, well groomed. No apparent distress. HEENT:  Normocephalic, atraumatic. Pupils equal, round, reactive to light. No scleral icterus. No conjunctival injection. Normal lips, teeth, and gums. No nasal discharge. Neck:  Supple, FROM  Heart: Right-sided Mediport in place irregular, tachycardic, no murmurs, gallops, rubs, carotid upstroke normal, no carotid bruits  Lungs:  CTA bilaterally, bilat symmetrical expansion, no wheeze, rales, or rhonchi  Abdomen: Bowel sounds present, soft, nontender, no masses, no organomegaly, no peritoneal signs  Extremities:  No clubbing, cyanosis, or edema  Skin:  Warm and dry, no open lesions or rash  Neuro:  Cranial nerves 2-12 intact, no focal deficits  Vascular: Radial and pedal Pulses 2+  Breast: deferred  Rectal: deferred  Genitalia:  deferred      DATA:     Recent Labs     12/03/20  1234   WBC 19.7*   HGB 15.7*   *     Recent Labs     12/03/20  1234 12/04/20  0450    143   K 3.7 3.6   BUN 93* 58*   CREATININE 1.7* 1.0     Recent Labs     12/03/20  1234   PROT 6.8   INR 1.2     Recent Labs     12/03/20  1234   AST 59*   ALT 47*   ALKPHOS 93   BILITOT 0.6     No results for input(s): BNP in the last 72 hours.   No results for input(s): CKTOTAL, CKMB, CKMBINDEX, TROPONINI in the last 72 hours. ASSESSMENT:      Principal Problem:    Septic shock with acute organ dysfunction due to Gram positive cocci (HCC)  Active Problems:    PAF (paroxysmal atrial fibrillation) (HCC)    Breast CA (HCC)    SARAN (acute kidney injury) ([facility] [address]    Chemotherapy adverse reaction    Leukocytosis    COVID-19    Atrial fibrillation with RVR (HCC)  Resolved Problems:    * No resolved hospital problems.  *        PLAN:    Admit to telemetry for evaluation of atrial fibrillation with RVR and Covid positive patient with known history of breast cancer currently on chemotherapy  Leukocytosis in immunocompromised host consistent with septicemia  Gram-positive cocci in chains times 2 out of 4 bottles-gram-positive septicemia  Infectious disease evaluation  IV vancomycin  Will defer remdesivir to infectious disease since they will be following patient-current oxygen saturation is 90% on room air  Supportive care      Atrial fibrillation with RVR  Patient not able to tolerate Cardizem drip secondary to blood pressure  Digoxin 125 IV daily continue   Eliquis as at  home  Blood pressure medications with parameters due  to hypotension  Swallow evaluation to assess for aspiration  N.p.o. until completed    Renal insufficiency  IV fluid hydration    Resume home medications      DVT prophylaxis  PT OT  Discharge plan    Liset Bui MD  12/4/2020  12:01 PM

## 2020-12-04 NOTE — PROGRESS NOTES
Physical Therapy    Facility/Department: [facility] MED SURG  Initial Assessment    NAME: Ortiz Tellez  : 1955  MRN: [id number]    Date of Service: 2020      Patient Diagnosis(es): The primary encounter diagnosis was Atrial fibrillation with RVR ([facility] [address] Diagnoses of SARAN (acute kidney injury) ([facility] [address] Septicemia ([facility] and Dehydration were also pertinent to this visit. has a past medical history of Cancer ([facility] Hyperlipidemia, Hypertension, Osteoarthritis, and Stroke due to embolism of right posterior cerebral artery ([facility] [address] has a past surgical history that includes [place] tooth extraction; Tonsillectomy; Cervix surgery; US BREAST BIOPSY W LOC DEVICE 1ST LESION LEFT (2020); SAUL US GUIDED PLACE LOC DEVICE PERC 1ST LESION (2020); and Port Surgery (N/A, 2020). Referring Provider:  Madi Quinteros MD       Evaluating Therapist: Dickie Lanes PT      Room #:608  DIAGNOSIS: A fib with RVR, SARAN  Additional Pertinent History:breast CA , CVA  PRECAUTIONS: falls, alarm, droplet plus isolation    Social:  Pt questionable historian. States she usually lives alone but son has been staying with her. Two floor but stays on first floor. States she is independent without device. Reports old hip injury at first stating R leg then later left leg. Initial Evaluation  Date: 20 Treatment      Short Term/ Long Term   Goals   Was pt agreeable to Eval/treatment? yes     Does pt have pain? no     Bed Mobility  Rolling: NT  Supine to sit: mod assist  Sit to supine: NT  Scooting: mod assist  Min assist   Transfers Sit to stand: min assist  Stand to sit: min assist  Stand pivot: NT  SBA   Ambulation    20 feet x2 with ww with CGA  75 feet with AAD with SBA   Stair Negotiation  Ascended and descended  NT   N/A secondary to isolation.     LE strength     4-/5    4/5   balance      Fair standing poor sitting     AM-PAC Raw score               15/24         Pt is alert and oriented to person, place and date but confusion noted. Difficulty reporting home setup and prior level of function. Poor problem solving  LE ROM: WFL  Edema: none  Endurance: fair  Chair alarm: yes     ASSESSMENT  Pt displays functional ability as noted in the objective portion of this evaluation. Patient education  Pt educated on PT objectives    Patient response to education:   Pt verbalized understanding Pt demonstrated skill Pt requires further education in this area   yes         ASSESSMENT:    Comments/treatment:  Pt in bed and agreeable to PT. Mod assist for supine to sit. Once seated pt with strong posterior lean. Mod assist for sitting balance. Pt holds R leg in hip flexion and up off ground. States she does this due to a hip injury. Pt able to sit edge of bed SB to mod assist however does begining to fall backwards when not supported. Pt instructed in transfer technique. Once standing pt holds L leg in hip and knee flexion when asked to straighten leg pt states \"I told you I have a hip injury\"  Pt had reported injury was on other side. Pt required cues for walker safety and safety with transfers. High risk for falls at this time    Pt's/ family goals   1. None stated    Patient and or family understand(s) diagnosis, prognosis, and plan of care.       PLAN OF CARE:    Current Treatment Recommendations     [x] Strengthening     [] ROM   [x] Balance Training   [x] Endurance Training   [x] Transfer Training   [x] Gait Training   [] Stair Training   [] Positioning   [x] Safety and Education Training   [x] Patient/Caregiver Education   [] HEP  [] Other     Frequency of treatments: 2-5x/week x 7.days    Time in  0905  Time out  0935    Total Treatment Time  15 minutes     Evaluation Time includes thorough review of current medical information, gathering information on past medical history/social history and prior level of function, completion of standardized testing/informal observation of tasks, assessment of data and education on plan of care and goals.     CPT codes:  [x] Low Complexity PT evaluation 00150  [] Moderate Complexity PT evaluation 66800  [] High Complexity PT evaluation 69104  [] PT Re-evaluation 84046  [] Gait training 58878 minutes  [] Manual therapy 66654 minutes  [x] Therapeutic activities 07201 15 minutes  [] Therapeutic exercises 01241 minutes  [] Neuromuscular reeducation 40961 minutes     [facility] PSYCHIATRY PT [id number]

## 2020-12-04 NOTE — CONSULTS
[address] [place] [address] [facility]  Consultation Note     Admit Date: 12/3/2020 12:07 PM    Reason for Consult:   Positive blood culture    Attending Physician:  Esther Stockton MD    HISTORY OF PRESENT ILLNESS:             The history is obtained from extensive review of available past medical records. The patient is a 60 y.o. female who was recently diagnosed with breast cancer and is undergoing chemotherapy. She had a positive SARS-CoV-2 exposure with her brother who had taken her to her chemotherapy sessions. She tested positive but cannot tell me when. She appears to be somewhat confused. He came to the ED on 12/3/2020 at the urging of her oncologist because she was having diarrhea, being weak at home. She denies having any fevers, chills or diaphoresis. Her Mediport was inserted on 11/13/2020.     Past Medical History:        Diagnosis Date    Cancer ([address] 11/2020    Breast-left and lymph nodes     Hyperlipidemia     Hypertension     Osteoarthritis     Stroke due to embolism of right posterior cerebral artery (HCC)      Past Surgical History:        Procedure Laterality Date    CERVIX SURGERY      SAUL US PERQ DEVICE SOFT TISSUE PLMT  FIRST LESION  9/30/2020    SAUL US PERQ DEVICE SOFT TISSUE PLMT  FIRST LESION [facility]30/[facility]    PORT SURGERY N/A 11/13/2020    POWER PORT INSERTION, RIGHT SUBCLAVIAN performed by Capri Antony MD at [address] [place] [address] LEFT  9/30/2020     BREAST NEEDLE BIOPSY LEFT 9/30/2020 [facility]    WISDOM TOOTH EXTRACTION       Current Medications:   Scheduled Meds:   digoxin  125 mcg Intravenous Daily    sodium chloride flush  10 mL Intravenous 2 times per day    apixaban  5 mg Oral BID    atorvastatin  40 mg Oral Nightly    calcium elemental  500 mg Oral BID    docusate sodium  100 mg Oral BID    lisinopril  10 mg Oral BID    metoprolol succinate  50 mg Oral BID    sodium chloride flush  10 mL Intravenous 2 times per day     Continuous Infusions:   sodium chloride 75 mL/hr (12/04/20 1224)    sodium chloride       PRN Meds:sodium chloride flush, sodium chloride flush, acetaminophen **OR** acetaminophen, promethazine **OR** ondansetron    Allergies:  Adhesive tape    Social History:   Social History     Socioeconomic History    Marital status:      Spouse name: None    Number of children: None    Years of education: None    Highest education level: None   Occupational History    None   Social Needs    Financial resource strain: None    Food insecurity     Worry: None     Inability: None    Transportation needs     Medical: None     Non-medical: None   Tobacco Use    Smoking status: Never Smoker    Smokeless tobacco: Never Used   Substance and Sexual Activity    Alcohol use: Not Currently     Comment: rare    Drug use: Never    Sexual activity: Never   Lifestyle    Physical activity     Days per week: None     Minutes per session: None    Stress: None   Relationships    Social connections     Talks on phone: None     Gets together: None     Attends Church service: None     Active member of club or organization: None     Attends meetings of clubs or organizations: None     Relationship status: None    Intimate partner violence     Fear of current or ex partner: None     Emotionally abused: None     Physically abused: None     Forced sexual activity: None   Other Topics Concern    None   Social History Narrative    None      Pets: None  Travel: No  Patient lives at home with her brother and nephew. All 3 have Covid    Family History:       Problem Relation Age of Onset    Heart Disease Mother    . Otherwise non-pertinent to the chief complaint.     REVIEW OF SYSTEMS:    Constitutional: Negative for fevers, chills, diaphoresis  Neurologic: Negative   Psychiatric: Negative  Rheumatologic: Negative   Endocrine: Negative  Hematologic: Negative  Immunologic: Negative  ENT: Negative  Respiratory: Negative   Cardiovascular: Negative  GI: Diarrhea  : Negative  Musculoskeletal: Negative  Skin: No rashes. PHYSICAL EXAM:    Vitals:   /72   Pulse 97   Temp 97.8 °F (36.6 °C) (Axillary)   Resp 18   Ht 5' 8\" (1.727 m)   Wt 173 lb (78.5 kg)   SpO2 90%   BMI 26.30 kg/m²   Constitutional: The patient is sitting up in a chair. She is awake, alert and seems to be confused and cannot give coherent information. Skin: Warm and dry. No rashes were noted. HEENT: Eyes show round, and reactive pupils. No jaundice. Moist mucous membranes, no ulcerations, no thrush. Neck: Supple to movements. No lymphadenopathy. Chest: No use of accessory muscles to breathe. Symmetrical expansion. Auscultation reveals no wheezing, crackles, or rhonchi. Cardiovascular: S1 and S2 are rhythmic and regular. No murmurs appreciated. Abdomen: Positive bowel sounds to auscultation. Benign to palpation. No masses felt. No hepatosplenomegaly. Extremities: No clubbing, no cyanosis, no edema. Lines: Right Mediport accessed.       CBC+dif:  Recent Labs     12/03/20  1234   WBC 19.7*   HGB 15.7*   HCT 49.2*   MCV 87.1   *   NEUTROABS 18.72*     No results found for: CRP   No results found for: CRPHS  No results found for: SEDRATE  Lab Results   Component Value Date    ALT 47 (H) 12/03/2020    AST 59 (H) 12/03/2020    ALKPHOS 93 12/03/2020    BILITOT 0.6 12/03/2020     Lab Results   Component Value Date     12/04/2020    K 3.6 12/04/2020    K 3.7 12/03/2020     12/04/2020    CO2 24 12/04/2020    BUN 58 12/04/2020    CREATININE 1.0 12/04/2020    GFRAA >60 12/04/2020    LABGLOM 56 12/04/2020    GLUCOSE 127 12/04/2020    PROT 6.8 12/03/2020    LABALBU 2.6 12/04/2020    CALCIUM 8.6 12/04/2020    BILITOT 0.6 12/03/2020    ALKPHOS 93 12/03/2020    AST 59 12/03/2020    ALT 47 12/03/2020       Lab Results   Component Value Date    PROTIME 13.3 12/03/2020    INR 1.2 12/03/2020       Lab Results Component Value Date    TSH 1.330 02/23/2018       Lab Results   Component Value Date    COLORU Yellow 12/03/2020    PHUR 6.0 12/03/2020    WBCUA 1-3 12/03/2020    RBCUA NONE 12/03/2020    BACTERIA RARE 12/03/2020    CLARITYU Clear 12/03/2020    SPECGRAV 1.020 12/03/2020    LEUKOCYTESUR Negative 12/03/2020    UROBILINOGEN 0.2 12/03/2020    BILIRUBINUR Negative 12/03/2020    BLOODU Negative 12/03/2020    GLUCOSEU Negative 12/03/2020       No results found for: HCO3, BE, O2SAT, PH, THGB, PCO2, PO2, TCO2  Radiology:  Reviewed    Microbiology:  Pending  Recent Labs     12/03/20  1220   BC Gram stain performed from blood culture bottle media  Gram positive cocci in chains  *     No results for input(s): ORG in the last 72 hours. No results for input(s): April  in the last 72 hours. No results for input(s): STREPNEUMAGU in the last 72 hours. No results for input(s): LP1UAG in the last 72 hours. No results for input(s): ASO in the last 72 hours. No results for input(s): CULTRESP in the last 72 hours. Recent Labs     12/03/20  1234 12/04/20  0450   PROCAL 0.63* 0.35*       Assessment:  · CLABSI  · SARS-CoV-2 infection, asymptomatic  · Streptococcus versus Enterococcus septicemia associated to CLABSI  · Confusion associated to the above  · Elevation of transaminases  · A. fib with RVR  · Hypotension secondary to atrial fibrillation versus bacteremia    Plan:    · We will give her a single dose of Daptomycin while waiting for the results of the final ID and sensitivities of the GPC in chains in the blood  · Repeat blood cultures x2, baseline ESR, CRP  · Consult general surgery to remove infected port  · Will follow with you    Thank you for having us see this patient in consultation. I will be discussing this case with the treating physicians. Spoke with nursing.     Nikos Jacobs  1:55 PM  12/4/2020

## 2020-12-04 NOTE — PROGRESS NOTES
bedside. If silent aspiration is suspected, a Videofluoroscopic Study of Swallowing (MBS) is recommended and requires a physician order. Immediate wet cough was noted after presentation of thin liquid                  The Speech Language Pathologist (SLP) completed education with the patient regarding results of evaluation. Explained that Speech Pathology intervention is warranted  at this time   Prognosis for improvements is fair     This plan will be re-evaluated and revised in 1 week  if warranted. Patient stated goals: Agreed with above,   Treatment goals discussed with Patient   The Patient understand(s) the diagnosis, prognosis and plan of care         INTERVENTION/EDUCATION    Pt educated on above results and plan of care. Pt trained on compensatory strategies for safe swallow with good outcome. Pt encouraged to engage in question/answer session. All questions answered and pt verbalized understanding of above. CPT code:  11981  bedside swallow eval, 44092 dysphagia therapy 15 mins      [x]The admitting diagnosis and active problem list, as listed below have been reviewed prior to initiation of this evaluation.      ADMITTING DIAGNOSIS: SARAN (acute kidney injury) ([address] [N17.9]  SARAN (acute kidney injury) ([address] [N17.9]     ACTIVE PROBLEM LIST:   Patient Active Problem List   Diagnosis    TIA (transient ischemic attack)    Acute CVA (cerebrovascular accident) ([address]    Transient cerebral ischemia    PAF (paroxysmal atrial fibrillation) ([address]    Breast CA ([address]    SARAN (acute kidney injury) ([address]    Chemotherapy adverse reaction    Leukocytosis    Septic shock with acute organ dysfunction due to Gram positive cocci (HCC)    COVID-19    Atrial fibrillation with RVR ([address]

## 2020-12-04 NOTE — PROGRESS NOTES
Occupational Therapy  OCCUPATIONAL THERAPY INITIAL EVALUATION      Date:2020  Patient Name: Cole Nicole  MRN: [id number]  : 1955  Room: [address] [place], [place] [address]A    Referring Provider: Jenae Pittman MD     Evaluating OT: Valery Hickey OTR/L [id number]    AM-PAC Daily Activity Raw Score: 15    Recommended Adaptive Equipment: TBD    Diagnosis: acute respiratory failure with hypoxia. Pt presents to ED from home with nausea, fatigue and diarrhea. COVID. Pertinent Medical History: CVA, OA, HTN, breast CA   Precautions:  Falls, droplet plus isolation COVID, O2     Home Living: PLOF from pt report. Pt is a questionable historian. Pt lives alone in a 2 story home with 1st floor B/B. Bathroom setup: walk in shower, standard commode     Prior Level of Function: Mod I? with ADLs pt reports use of sock aid for socks, son? assists with IADLs; completed functional mobility with no AD. Pt reports she no DME. Pain Level: altering reports of L/R hip pain with movement. Difficult to determine from multiple pt reports     Cognition: A&O: 3/4. Pt is oriented to self, hospital and temporal concepts. Confusion regarding situation. Pt verbalizes feeling confused and confusion observed throughout session.     Problem solving:  poor   Judgement/safety:  Poor   Direction followin step instruction min to mod cues for follow through   Sequencing: Poor     Functional Assessment:   Initial Eval Status  Date: 20 Treatment session:  Short Term Goals     Feeding SBA  Moderate spillage post breakfast  Mild pocketing of food observed cues to clear  Nursing notified  Independent   Grooming CGA  Standing sink level for hand hygiene and brushing teeth  Requires support at countertop to maintain balance  Independent   UB Dressing Min A  Donning/doffing hospital gown  Independent   LB Dressing Max A  Donning B socks and brief  Pt able to assist in hiking brief up over hips in standing  Unable to sequence or problem solve threading brief through either foot  Mod I    Bathing Max A  Mod I   Toileting Mod A  Use of grab bar for support in transfer and to maintain balance in standing  Assist in posterior tamir care and brief management  Mod I   Bed Mobility  Supine to sit: Mod A  Scooting to EOB: Mod A  Strong posterior lean at EOB     Functional Transfers STS: Min A  Mod I   Functional Mobility Min A with [employer]  Household distance  Limited further d/t fatigue/weakness  Mod I during ADLs   Balance Sitting: varying levels from poor to fair minus seated EOB  Unable to problem solve correct posture for sitting balance, strong posterior lean    Standing: fair minus at [employer]     Activity Tolerance Poor plus  standing jean x6-7 min with fair plus balance during self care tasks             Treatment: Patient educated on techniques for completion of ADL, safe functional transfers and functional mobility. Patient required cues for follow through with proper hand/foot placement, pacing, safety, attention, sequencing and technique in bed mobility, functional transfers, functional mobility, toileting, grooming, UB dressing, self feeding and LB dressing in preparation for maximum independence in all self care tasks. Hand Dominance: Right [x]  Left []   Strength ROM Additional Info:    RUE  Proximally: 3-/5  Distally: 3+/5 WFL elbow to digits. R shoulder flexion approx 100 degrees fair  and FMC/dexterity noted during ADL tasks     LUE Proximally: 3-/5  Distally: 3+/5 WFL elbow to digits.  L shoulder flexion approx 100 degrees fair  and FMC/dexterity noted during ADL tasks         Hearing: WFL   Vision: WFL   Sensation:  No c/o numbness or tingling   Tone: WFL   Edema: none                             Long Term Goal (1-3 wks): Pt will maximize functional performance in all self care tasks/functional transfers with good follow through of all trained techniques for safe transition to next level of care    Assessment of current deficits   Functional mobility [x]  ADLs [x] Strength [x]  Cognition []  Functional transfers  [x] IADLs [x] Safety Awareness [x]  Endurance [x]  Fine Motor Coordination [x] Balance [x] Vision/perception [] Sensation []   Gross Motor Coordination [x] ROM [x] Delirium [x]                  Motor Control []    Plan of Care: 1-4 days/week for 1-2 weeks PRN   [x]ADL retraining/adaptive techniques and AE recommendations to increase functional independence within precautions                    [x]Energy conservation techniques to improve tolerance for ADL/IADLs  [x]Functional transfer/mobility training/DME recommendations for increased independence, safety and fall prevention         [x]Patient/family education to increase safety and functional independence during daily routine          [x]Environmental modifications for safe mobility and completion of ADLs                             []Cognitive retraining to improve problem solving skills & safe participation in ADLs/IADLs     []Sensory re-education techniques to improve extremity awareness, maintain skin integrity and improve hand function                             []Visual/Perceptual retraining to improve body awareness and safety during transfers and ADLs  []Splinting/positioning needs to maintain joint/skin integrity and contracture prevention  [x]Therapeutic activity to improve functional performance during ADLs                                        [x]Therapeutic exercise to improve tolerance and functional strength for ADLs   [x]Balance retraining/tolerance tasks for facilitation of postural control with dynamic challenges during ADLs  [x]Neuromuscular re-education to facilitate righting/equilibrium reactions, midline orientation, scapular stability/mobility, normalize muscle tone and facilitate active functional movement                        [x]Delirium prevention/treatment    [x]Positioning to improve functional independence and decrease risk of skin breakdown  []Other: Rehab Potential: Good for established goals     Patient/Family Goal: To get home. Patient and/or family were instructed on functional diagnosis, prognosis/goals and OT plan of care. Pt verbalized questionable understanding. Upon arrival, patient supine in bed. At end of session, patient seated in armchair with call light and phone within reach, all lines and tubes intact. Pt would benefit from continued skilled OT to increase safety and independence with completion of ADL/IADL tasks for functional independence and quality of life. Bed/chair alarm: ON. Nursing notified of status.     Low Evaluation 88315  Time In: 910   Time Out: [address]     Therapeutic Activities 26220     ADL/Self Care 26790 12 1   Orthotic Management 35257     Neuro Re-Ed 16533     TOTAL TIMED TREATMENT 12 1       Evaluation time includes thorough review of current medical information, gathering information on past medical history/social history and prior level of function, completion of standardized testing/informal observation of tasks, assessment of data, and development of POC/Goals    Gregorio Reid OTR/L  [id number]

## 2020-12-04 NOTE — CONSULTS
GENERAL SURGERY  CONSULT NOTE  12/4/2020    Physician Consulted: Dr. Jesus Vinson  Reason for Consult: Evaluate mediport  Referring Physician: Dr. Preston Lui is a 60 y.o. female with PMH of breast cancer presented to the hospital due to COVID, weakness and diarrhea. She recently received a mediport placed for chemotherapy on 11/13 and received her first dose on 11/20. She was advised to come to the ED after having days of increased fatigue, dehydration and diarrhea. She was then found to be covid positive. Blood cultures were taken and they are growing gram positive cocci. Infectious disease suspects it to be a central line associated infection from her port. There is no drainage or erythema around the port site. She describes no pain or discomfort associated with it and it is still functioning well. Nursing staff also noticed she is having some difficulty swallowing and has ordered a swallow evaluation      Past Medical History:   Diagnosis Date    Cancer ([facility] 11/2020    Breast-left and lymph nodes     Hyperlipidemia     Hypertension     Osteoarthritis     Stroke due to embolism of right posterior cerebral artery ([facility]        Past Surgical History:   Procedure Laterality Date    CERVIX SURGERY      SAUL US PERQ DEVICE SOFT TISSUE PLMT  FIRST LESION  9/30/2020    SAUL US PERQ DEVICE SOFT TISSUE PLMT  FIRST LESION 9/30/2020 [facility]    PORT SURGERY N/A 11/13/2020    POWER PORT INSERTION, RIGHT SUBCLAVIAN performed by Isa Kapoor MD at [address] [place] [address] LEFT  9/30/2020     BREAST NEEDLE BIOPSY LEFT [facility]/30/2020 [facility]    WISDOM TOOTH EXTRACTION         Medications Prior to Admission:    Prior to Admission medications    Medication Sig Start Date End Date Taking?  Authorizing Provider   dexamethasone (DECADRON) 4 MG tablet Take 4 mg by mouth See Admin Instructions Take 2 tabs day before, day of, and day after chemotherapy   Yes Historical negative  Genito-Urinary ROS: negative  Musculoskeletal ROS: negative      PHYSICAL EXAM:    Vitals:    12/04/20 1400   BP: 113/63   Pulse: 101   Resp: 18   Temp: 97.3 °F (36.3 °C)   SpO2: 95%       General Appearance:  awake, alert, in no acute distress  Skin:  Skin color, texture, turgor normal. No rashes or lesions. Port in place in right chest with no surrounding erythema or drainage   Head/face:  NCAT  Eyes:  No gross abnormalities. Lungs:  Normal expansion. Heart: tachycardic  Abdomen:  Soft, non-tender, non-distended  Extremities: Extremities warm to touch, pink, with no edema. LABS:    CBC  Recent Labs     12/03/20  1234   WBC 19.7*   HGB 15.7*   HCT 49.2*   *     BMP  Recent Labs     12/04/20  0450      K 3.6   *   CO2 24   BUN 58*   CREATININE 1.0   CALCIUM 8.6     Liver Function  Recent Labs     12/03/20  1234 12/04/20  0450   BILITOT 0.6  --    AST 59*  --    ALT 47*  --    ALKPHOS 93  --    PROT 6.8  --    LABALBU 3.2* 2.6*     No results for input(s): LACTATE in the last 72 hours. Recent Labs     12/03/20  1234   INR 1.2       RADIOLOGY    Xr Chest Portable    Result Date: 12/3/2020  EXAMINATION: ONE XRAY VIEW OF THE CHEST 12/3/2020 12:05 pm COMPARISON: 13 November 2020 HISTORY: ORDERING SYSTEM PROVIDED HISTORY: possible sepsis TECHNOLOGIST PROVIDED HISTORY: Reason for exam:->possible sepsis FINDINGS: Stable implanted central venous catheter on the right. There is new linear opacity in the right mid lung which is likely atelectasis. Stable cardiomediastinal silhouette. Neither costophrenic angle is visibly blunted. Mild linear atelectasis on the right.         ASSESSMENT:  60 y.o. female with breast cancer on chemotherapy now with covid and positive blood cultures     PLAN:  - Will await final blood culture results prior to any surgical intervention  - Continue antibiotics  - F/u formal swallow study    Discussed with Dr. Deborah Paul    Electronically signed by Ashley Powell MD on 12/4/20 at 5:00 PM EST

## 2020-12-04 NOTE — PROGRESS NOTES
Database complete. Medications reconciled. Care plans and education initiated. Right chest Mediport. Last received chemotherapy with Dr. Sasha Troncoso on 11/20/20. Pt states next chemotherapy was to be 12/10/20, but believes that with be rescheduled. Complaint of diarrhea on/off for past few weeks. Cardiologist is Dr. Brennon Jackson. Pt states she has not taken her medications for the past 3-4 weeks.

## 2020-12-04 NOTE — ED NOTES
RN faxed SBAR to floor. Confirmation received and spoke with [employer]. Pt ready for transport to room.        Tammy Rubio RN  12/03/20 9102

## 2020-12-04 NOTE — CARE COORDINATION
CM NOTE: Per QFR---covid positive in droplet plus isolation. Admitted with SARAN & currently on IVF. +AF on cardizem drip. Hx left breast cancer---last chemo 11/20/20. Hem/onc following. Receiving IV cefepime. CM spoke with pt to discuss role, anticipated LOS & current plan of care. Reports living alone in 1 story home & is independent. Is able to use steps as needed. Does not use ADs or O2 at home. Is not diabetic & has no hx ABIMBOLA. Cannot remember if she has had home care or not. Discussed dx, current medications & TX plan. Currently on RA. No preference for DME if needed. [facility] 15/24. Plan is home at discharge. Declines need for HHC at this time. States if she needs it, she will ask. CM & SW will continue to follow pt.

## 2020-12-05 ENCOUNTER — ANESTHESIA EVENT (OUTPATIENT)
Dept: OPERATING ROOM | Age: 65
DRG: 314 | End: 2020-12-05
Payer: COMMERCIAL

## 2020-12-05 LAB
ALBUMIN SERPL-MCNC: 4.1 G/DL (ref 3.5–5.2)
ALP BLD-CCNC: 83 U/L (ref 35–104)
ALT SERPL-CCNC: 32 U/L (ref 0–32)
ANION GAP SERPL CALCULATED.3IONS-SCNC: 10 MMOL/L (ref 7–16)
AST SERPL-CCNC: 38 U/L (ref 0–31)
BILIRUB SERPL-MCNC: 0.5 MG/DL (ref 0–1.2)
BUN BLDV-MCNC: 31 MG/DL (ref 8–23)
CALCIUM SERPL-MCNC: 8.7 MG/DL (ref 8.6–10.2)
CHLORIDE BLD-SCNC: 115 MMOL/L (ref 98–107)
CO2: 22 MMOL/L (ref 22–29)
CREAT SERPL-MCNC: 0.8 MG/DL (ref 0.5–1)
GFR AFRICAN AMERICAN: >60
GFR NON-AFRICAN AMERICAN: >60 ML/MIN/1.73
GLUCOSE BLD-MCNC: 137 MG/DL (ref 74–99)
HCT VFR BLD CALC: 37.8 % (ref 34–48)
HEMOGLOBIN: 12.4 G/DL (ref 11.5–15.5)
MCH RBC QN AUTO: 29 PG (ref 26–35)
MCHC RBC AUTO-ENTMCNC: 32.8 % (ref 32–34.5)
MCV RBC AUTO: 88.3 FL (ref 80–99.9)
PDW BLD-RTO: 13.7 FL (ref 11.5–15)
PLATELET # BLD: 157 E9/L (ref 130–450)
PMV BLD AUTO: 12.6 FL (ref 7–12)
POTASSIUM SERPL-SCNC: 3.1 MMOL/L (ref 3.5–5)
RBC # BLD: 4.28 E12/L (ref 3.5–5.5)
SODIUM BLD-SCNC: 147 MMOL/L (ref 132–146)
TOTAL PROTEIN: 5.4 G/DL (ref 6.4–8.3)
WBC # BLD: 16.4 E9/L (ref 4.5–11.5)

## 2020-12-05 PROCEDURE — 2580000003 HC RX 258: Performed by: SPECIALIST

## 2020-12-05 PROCEDURE — 80053 COMPREHEN METABOLIC PANEL: CPT

## 2020-12-05 PROCEDURE — 6360000002 HC RX W HCPCS: Performed by: INTERNAL MEDICINE

## 2020-12-05 PROCEDURE — 2580000003 HC RX 258: Performed by: INTERNAL MEDICINE

## 2020-12-05 PROCEDURE — 6370000000 HC RX 637 (ALT 250 FOR IP): Performed by: INTERNAL MEDICINE

## 2020-12-05 PROCEDURE — 2060000000 HC ICU INTERMEDIATE R&B

## 2020-12-05 PROCEDURE — 6360000002 HC RX W HCPCS: Performed by: SPECIALIST

## 2020-12-05 PROCEDURE — 85027 COMPLETE CBC AUTOMATED: CPT

## 2020-12-05 PROCEDURE — 36415 COLL VENOUS BLD VENIPUNCTURE: CPT

## 2020-12-05 RX ORDER — DIGOXIN 125 MCG
125 TABLET ORAL DAILY
Status: DISCONTINUED | OUTPATIENT
Start: 2020-12-06 | End: 2020-12-08 | Stop reason: HOSPADM

## 2020-12-05 RX ORDER — POTASSIUM CHLORIDE AND SODIUM CHLORIDE 450; 150 MG/100ML; MG/100ML
INJECTION, SOLUTION INTRAVENOUS CONTINUOUS
Status: DISCONTINUED | OUTPATIENT
Start: 2020-12-05 | End: 2020-12-07

## 2020-12-05 RX ADMIN — APIXABAN 5 MG: 5 TABLET, FILM COATED ORAL at 20:15

## 2020-12-05 RX ADMIN — Medication 500 MG: at 09:13

## 2020-12-05 RX ADMIN — SODIUM CHLORIDE, PRESERVATIVE FREE 10 ML: 5 INJECTION INTRAVENOUS at 09:14

## 2020-12-05 RX ADMIN — ATORVASTATIN CALCIUM 40 MG: 40 TABLET, FILM COATED ORAL at 20:15

## 2020-12-05 RX ADMIN — POTASSIUM CHLORIDE AND SODIUM CHLORIDE: 450; 150 INJECTION, SOLUTION INTRAVENOUS at 18:05

## 2020-12-05 RX ADMIN — Medication 500 MG: at 20:15

## 2020-12-05 RX ADMIN — METOPROLOL SUCCINATE 50 MG: 50 TABLET, FILM COATED, EXTENDED RELEASE ORAL at 20:15

## 2020-12-05 RX ADMIN — METOPROLOL SUCCINATE 50 MG: 50 TABLET, FILM COATED, EXTENDED RELEASE ORAL at 09:13

## 2020-12-05 RX ADMIN — APIXABAN 5 MG: 5 TABLET, FILM COATED ORAL at 09:13

## 2020-12-05 RX ADMIN — DIGOXIN 125 MCG: 0.25 INJECTION INTRAMUSCULAR; INTRAVENOUS at 09:13

## 2020-12-05 RX ADMIN — LISINOPRIL 10 MG: 10 TABLET ORAL at 20:15

## 2020-12-05 RX ADMIN — DAPTOMYCIN 450 MG: 500 INJECTION, POWDER, LYOPHILIZED, FOR SOLUTION INTRAVENOUS at 17:30

## 2020-12-05 RX ADMIN — LISINOPRIL 10 MG: 10 TABLET ORAL at 09:13

## 2020-12-05 ASSESSMENT — PAIN SCALES - GENERAL
PAINLEVEL_OUTOF10: 0

## 2020-12-05 NOTE — PROGRESS NOTES
Department of Surgery - Adult  General Surgery  Dr. Karime Spears's Progress Note      SUBJECTIVE: Positive blood cultures/possible infected Mediport    OBJECTIVE      Physical    VITALS:  /60   Pulse 102   Temp 97.7 °F (36.5 °C) (Axillary)   Resp 16   Ht 5' 8\" (1.727 m)   Wt 173 lb (78.5 kg)   SpO2 93%   BMI 26.30 kg/m²   INTAKE/OUTPUT:      Intake/Output Summary (Last 24 hours) at 2020 1044  Last data filed at 2020 1539  Gross per 24 hour   Intake --   Output 375 ml   Net -375 ml     TEMPERATURE:  Current - Temp: 97.7 °F (36.5 °C); Max - Temp  Av.7 °F (36.5 °C)  Min: 97.3 °F (36.3 °C)  Max: 98 °F (36.7 °C)  RESPIRATIONS RANGE: Resp  Av.7  Min: 16  Max: 18  PULSE RANGE: Pulse  Av.8  Min: 97  Max: 115  BLOOD PRESSURE RANGE:  Systolic (92UMA), NGX:794 , Min:103 , SDU:777   ; Diastolic (18IJK), TGF:82, Min:59, Max:72    PULSE OXIMETRY RANGE: SpO2  Av.7 %  Min: 93 %  Max: 96 %    Chest wall: The patient has ecchymosis involving the right chest wall. A needle is still cannulating the port. No discharge is noted.     Data  CBC with Differential:    Lab Results   Component Value Date    WBC 19.7 2020    RBC 5.65 2020    HGB 15.7 2020    HCT 49.2 2020     2020    MCV 87.1 2020    MCH 27.8 2020    MCHC 31.9 2020    RDW 13.4 2020    NRBC 0.0 2020    LYMPHOPCT 0.0 2020    MONOPCT 4.3 2020    BASOPCT 0.0 2020    MONOSABS 0.79 2020    LYMPHSABS 0.20 2020    EOSABS 0.00 2020    BASOSABS 0.00 2020     CMP:    Lab Results   Component Value Date     2020    K 3.6 2020    K 3.7 2020     2020    CO2 24 2020    BUN 58 2020    CREATININE 1.0 2020    GFRAA >60 2020    LABGLOM 56 2020    GLUCOSE 127 2020    PROT 6.8 2020    LABALBU 2.6 2020    CALCIUM 8.6 2020    BILITOT 0.6 2020    ALKPHOS 93 12/03/2020    AST 59 12/03/2020    ALT 47 12/03/2020     BMP:  Hepatic Function Panel:  Ionized Calcium:  No results found for: IONCA  Magnesium:    Lab Results   Component Value Date    MG 1.9 05/21/2018     Phosphorus:    Lab Results   Component Value Date    PHOS 2.4 12/04/2020       Current Inpatient Medications    Current Facility-Administered Medications: digoxin (LANOXIN) injection 125 mcg, 125 mcg, Intravenous, Daily  0.9 % sodium chloride infusion, , Intravenous, Continuous  sodium chloride flush 0.9 % injection 10 mL, 10 mL, Intravenous, 2 times per day  sodium chloride flush 0.9 % injection 10 mL, 10 mL, Intravenous, PRN  apixaban (ELIQUIS) tablet 5 mg, 5 mg, Oral, BID  atorvastatin (LIPITOR) tablet 40 mg, 40 mg, Oral, Nightly  calcium elemental (OSCAL) tablet 500 mg, 500 mg, Oral, BID  docusate sodium (COLACE) capsule 100 mg, 100 mg, Oral, BID  lisinopril (PRINIVIL;ZESTRIL) tablet 10 mg, 10 mg, Oral, BID  metoprolol succinate (TOPROL XL) extended release tablet 50 mg, 50 mg, Oral, BID  sodium chloride flush 0.9 % injection 10 mL, 10 mL, Intravenous, 2 times per day  sodium chloride flush 0.9 % injection 10 mL, 10 mL, Intravenous, PRN  acetaminophen (TYLENOL) tablet 650 mg, 650 mg, Oral, Q6H PRN **OR** acetaminophen (TYLENOL) suppository 650 mg, 650 mg, Rectal, Q6H PRN  promethazine (PHENERGAN) tablet 12.5 mg, 12.5 mg, Oral, Q6H PRN **OR** ondansetron (ZOFRAN) injection 4 mg, 4 mg, Intravenous, Q6H PRN    ASSESSMENT:    60 y.o. female central line sepsis related to Port-A-Cath    PLAN:    Remove Port-A-Cath tomorrow.   Yamile Gordon 12/5/202010:44 AM

## 2020-12-05 NOTE — PROGRESS NOTES
Subjective: The patient is awake and alert. Overall feeling somewhat better  No acute complaints  No shortness of breath    Objective:    /60   Pulse 102   Temp 97.7 °F (36.5 °C) (Axillary)   Resp 16   Ht 5' 8\" (1.727 m)   Wt 173 lb (78.5 kg)   SpO2 93%   BMI 26.30 kg/m²     In: 50 [P.O.:50]  Out: -   In: 50   Out: -     General appearance: NAD, conversant  HEENT: AT/NC, MMM  Neck: FROM, supple  Lungs: Clear to auscultation  CV: RRR, no MRGs  Vasc: Radial pulses 2+  Abdomen: Soft, non-tender; no masses or HSM  Extremities: No peripheral edema or digital cyanosis  Skin: no rash, lesions or ulcers  Psych: Alert and oriented to person, place and time  Neuro: Alert and interactive     Recent Labs     12/03/20  1234 12/05/20  1355   WBC 19.7* 16.4*   HGB 15.7* 12.4   HCT 49.2* 37.8   * 157       Recent Labs     12/03/20  1234 12/04/20  0450 12/05/20  1355    143 147*   K 3.7 3.6 3.1*   CL 99 109* 115*   CO2 21* 24 22   BUN 93* 58* 31*   CREATININE 1.7* 1.0 0.8   CALCIUM 9.1 8.6 8.7       Assessment:    Principal Problem:    Septic shock with acute organ dysfunction due to Gram positive cocci (HCC)  Active Problems:    PAF (paroxysmal atrial fibrillation) (HCC)    Breast CA (HCC)    SARAN (acute kidney injury) (HCC)    Chemotherapy adverse reaction    Leukocytosis    COVID-19    Atrial fibrillation with RVR (HCC)  Resolved Problems:    * No resolved hospital problems.  *      Plan:    Admit to telemetry for evaluation of atrial fibrillation with RVR and Covid positive patient with known history of breast cancer currently on chemotherapy  Leukocytosis in immunocompromised host consistent with septicemia  Gram-positive cocci in chains times 2 out of 4 bottles-gram-positive septicemia  Infectious disease evaluation  Patient on daptomycin per infectious disease  No plans for remdesivir  Supportive care  Awaiting removal of right-sided Mediport by general surgery        Atrial fibrillation with RVR  Patient not able to tolerate Cardizem drip secondary to blood pressure  Digoxin 125 IV daily continue   Eliquis as at  home  Blood pressure medications with parameters due  to hypotension  Swallow evaluation to assess for aspiration  N.p.o. until completed     Renal insufficiency/hypernatremia with hypokalemia  IV fluid hydration with D5 20 mEq  K     Resume home medications  DVT Prophylaxis   PT/OT  Discharge planning           Doreen Patterson MD  3:58 PM  12/5/2020

## 2020-12-05 NOTE — PROGRESS NOTES
Progress Note    Subjective:  Patient Covid positive. Scheduled to have port removed tomorrow. Breathing is stable. Oxygen saturation 93%    Objective:    /60   Pulse 102   Temp 97.7 °F (36.5 °C) (Axillary)   Resp 16   Ht 5' 8\" (1.727 m)   Wt 173 lb (78.5 kg)   SpO2 93%   BMI 26.30 kg/m²     General: Pleasant woman awake alert oriented x3 no apparent distress  HEENT: Anicteric sclera, oropharynx is clear, no mucositis or thrush  Heart:  RRR, no murmurs, gallops, or rubs. Lungs:  CTA bilaterally, no wheeze, rales or rhonchi  Abd: bowel sounds present, nontender, nondistended, no masses  Extrem: No edema. CBC:   Lab Results   Component Value Date    WBC 19.7 12/03/2020    RBC 5.65 12/03/2020    HGB 15.7 12/03/2020    HCT 49.2 12/03/2020    MCV 87.1 12/03/2020    MCH 27.8 12/03/2020    MCHC 31.9 12/03/2020    RDW 13.4 12/03/2020     12/03/2020    MPV 12.7 12/03/2020     CMP:    Lab Results   Component Value Date     12/04/2020    K 3.6 12/04/2020    K 3.7 12/03/2020     12/04/2020    CO2 24 12/04/2020    BUN 58 12/04/2020    CREATININE 1.0 12/04/2020    GFRAA >60 12/04/2020    LABGLOM 56 12/04/2020    GLUCOSE 127 12/04/2020    PROT 6.8 12/03/2020    LABALBU 2.6 12/04/2020    CALCIUM 8.6 12/04/2020    BILITOT 0.6 12/03/2020    ALKPHOS 93 12/03/2020    AST 59 12/03/2020    ALT 47 12/03/2020            XR CHEST PORTABLE   Final Result   Mild linear atelectasis on the right.             Current Facility-Administered Medications:     digoxin (LANOXIN) injection 125 mcg, 125 mcg, Intravenous, Daily, Blake Albrecht MD, 125 mcg at 12/05/20 0913    0.9 % sodium chloride infusion, , Intravenous, Continuous, Blake Albrecht MD, Last Rate: 75 mL/hr at 12/04/20 1224, 75 mL/hr at 12/04/20 1224    sodium chloride flush 0.9 % injection 10 mL, 10 mL, Intravenous, 2 times per day, Cathlene Salt, DO, 10 mL at 12/03/20 2227    sodium chloride flush 0.9 % injection 10 mL, 10 mL, Intravenous, PRN, Jeris Shells, DO    apixaban (ELIQUIS) tablet 5 mg, 5 mg, Oral, BID, Sera Julien MD, 5 mg at 12/05/20 0913    atorvastatin (LIPITOR) tablet 40 mg, 40 mg, Oral, Nightly, Sera Julien MD, 40 mg at 12/04/20 2200    calcium elemental (OSCAL) tablet 500 mg, 500 mg, Oral, BID, Sera Julien MD, 500 mg at 12/05/20 0913    docusate sodium (COLACE) capsule 100 mg, 100 mg, Oral, BID, Sera Julien MD, 100 mg at 12/05/20 0913    lisinopril (PRINIVIL;ZESTRIL) tablet 10 mg, 10 mg, Oral, BID, Sera Julien MD, 10 mg at 12/05/20 0913    metoprolol succinate (TOPROL XL) extended release tablet 50 mg, 50 mg, Oral, BID, Sera Julien MD, 50 mg at 12/05/20 0913    sodium chloride flush 0.9 % injection 10 mL, 10 mL, Intravenous, 2 times per day, Sera Julien MD, 10 mL at 12/05/20 0914    sodium chloride flush 0.9 % injection 10 mL, 10 mL, Intravenous, PRN, Sera Julien MD    acetaminophen (TYLENOL) tablet 650 mg, 650 mg, Oral, Q6H PRN **OR** acetaminophen (TYLENOL) suppository 650 mg, 650 mg, Rectal, Q6H PRN, Sera Julien MD    promethazine (PHENERGAN) tablet 12.5 mg, 12.5 mg, Oral, Q6H PRN **OR** ondansetron (ZOFRAN) injection 4 mg, 4 mg, Intravenous, Q6H PRN, Sera Julien MD    Assessment/Plan:  81-year-old woman with clinical stage II ER positive, HER-2/sheba positive left breast cancer she had a first cycle of neoadjuvant chemotherapy with carboplatin, Taxotere, trastuzumab and Perjeta November 20, 2020. Admitted with fatigue, lethargy and diarrhea. Found to be in atrial fibrillation in the ER. Gram-negative cocci bacteremia and Mediport infection. Patient also asymptomatic COVID-19 infection. -Mediport to be removed tomorrow.  -Follow-up on ID and sensitivity regarding the bacteremia.   Infectious disease following and managing her antibiotic coverage        Electronically signed by Anisha Pascal MD on 12/5/2020 at 12:21 PM

## 2020-12-06 ENCOUNTER — ANESTHESIA (OUTPATIENT)
Dept: OPERATING ROOM | Age: 65
DRG: 314 | End: 2020-12-06
Payer: COMMERCIAL

## 2020-12-06 VITALS — OXYGEN SATURATION: 100 % | SYSTOLIC BLOOD PRESSURE: 114 MMHG | DIASTOLIC BLOOD PRESSURE: 68 MMHG

## 2020-12-06 LAB
ANION GAP SERPL CALCULATED.3IONS-SCNC: 11 MMOL/L (ref 7–16)
BUN BLDV-MCNC: 15 MG/DL (ref 8–23)
CALCIUM SERPL-MCNC: 8.6 MG/DL (ref 8.6–10.2)
CHLORIDE BLD-SCNC: 114 MMOL/L (ref 98–107)
CO2: 20 MMOL/L (ref 22–29)
CREAT SERPL-MCNC: 0.8 MG/DL (ref 0.5–1)
GFR AFRICAN AMERICAN: >60
GFR NON-AFRICAN AMERICAN: >60 ML/MIN/1.73
GLUCOSE BLD-MCNC: 103 MG/DL (ref 74–99)
ORGANISM: ABNORMAL
POTASSIUM SERPL-SCNC: 3.4 MMOL/L (ref 3.5–5)
SODIUM BLD-SCNC: 145 MMOL/L (ref 132–146)
TOTAL CK: 156 U/L (ref 20–180)
URINE CULTURE, ROUTINE: NORMAL

## 2020-12-06 PROCEDURE — 2060000000 HC ICU INTERMEDIATE R&B

## 2020-12-06 PROCEDURE — 36415 COLL VENOUS BLD VENIPUNCTURE: CPT

## 2020-12-06 PROCEDURE — 2500000003 HC RX 250 WO HCPCS: Performed by: SURGERY

## 2020-12-06 PROCEDURE — 80048 BASIC METABOLIC PNL TOTAL CA: CPT

## 2020-12-06 PROCEDURE — 3700000001 HC ADD 15 MINUTES (ANESTHESIA): Performed by: SURGERY

## 2020-12-06 PROCEDURE — 87070 CULTURE OTHR SPECIMN AEROBIC: CPT

## 2020-12-06 PROCEDURE — 2580000003 HC RX 258: Performed by: SURGERY

## 2020-12-06 PROCEDURE — 6370000000 HC RX 637 (ALT 250 FOR IP): Performed by: SURGERY

## 2020-12-06 PROCEDURE — 3700000000 HC ANESTHESIA ATTENDED CARE: Performed by: SURGERY

## 2020-12-06 PROCEDURE — 0JPT0WZ REMOVAL OF TOTALLY IMPLANTABLE VASCULAR ACCESS DEVICE FROM TRUNK SUBCUTANEOUS TISSUE AND FASCIA, OPEN APPROACH: ICD-10-PCS | Performed by: SURGERY

## 2020-12-06 PROCEDURE — 2500000003 HC RX 250 WO HCPCS: Performed by: NURSE ANESTHETIST, CERTIFIED REGISTERED

## 2020-12-06 PROCEDURE — 2580000003 HC RX 258: Performed by: SPECIALIST

## 2020-12-06 PROCEDURE — 3600000002 HC SURGERY LEVEL 2 BASE: Performed by: SURGERY

## 2020-12-06 PROCEDURE — 2580000003 HC RX 258: Performed by: NURSE ANESTHETIST, CERTIFIED REGISTERED

## 2020-12-06 PROCEDURE — 6360000002 HC RX W HCPCS: Performed by: SURGERY

## 2020-12-06 PROCEDURE — 82550 ASSAY OF CK (CPK): CPT

## 2020-12-06 PROCEDURE — 3600000012 HC SURGERY LEVEL 2 ADDTL 15MIN: Performed by: SURGERY

## 2020-12-06 PROCEDURE — 6360000002 HC RX W HCPCS: Performed by: SPECIALIST

## 2020-12-06 PROCEDURE — 02PY33Z REMOVAL OF INFUSION DEVICE FROM GREAT VESSEL, PERCUTANEOUS APPROACH: ICD-10-PCS | Performed by: SURGERY

## 2020-12-06 PROCEDURE — 6360000002 HC RX W HCPCS: Performed by: NURSE ANESTHETIST, CERTIFIED REGISTERED

## 2020-12-06 PROCEDURE — 2709999900 HC NON-CHARGEABLE SUPPLY: Performed by: SURGERY

## 2020-12-06 PROCEDURE — 7100000010 HC PHASE II RECOVERY - FIRST 15 MIN: Performed by: SURGERY

## 2020-12-06 PROCEDURE — 7100000011 HC PHASE II RECOVERY - ADDTL 15 MIN: Performed by: SURGERY

## 2020-12-06 RX ORDER — LIDOCAINE HYDROCHLORIDE 20 MG/ML
INJECTION, SOLUTION EPIDURAL; INFILTRATION; INTRACAUDAL; PERINEURAL PRN
Status: DISCONTINUED | OUTPATIENT
Start: 2020-12-06 | End: 2020-12-06 | Stop reason: SDUPTHER

## 2020-12-06 RX ORDER — PROPOFOL 10 MG/ML
INJECTION, EMULSION INTRAVENOUS PRN
Status: DISCONTINUED | OUTPATIENT
Start: 2020-12-06 | End: 2020-12-06 | Stop reason: SDUPTHER

## 2020-12-06 RX ORDER — SODIUM CHLORIDE 9 MG/ML
INJECTION, SOLUTION INTRAVENOUS CONTINUOUS PRN
Status: DISCONTINUED | OUTPATIENT
Start: 2020-12-06 | End: 2020-12-06 | Stop reason: SDUPTHER

## 2020-12-06 RX ORDER — LIDOCAINE HYDROCHLORIDE 10 MG/ML
INJECTION, SOLUTION INFILTRATION; PERINEURAL PRN
Status: DISCONTINUED | OUTPATIENT
Start: 2020-12-06 | End: 2020-12-06 | Stop reason: HOSPADM

## 2020-12-06 RX ORDER — PROPOFOL 10 MG/ML
INJECTION, EMULSION INTRAVENOUS CONTINUOUS PRN
Status: DISCONTINUED | OUTPATIENT
Start: 2020-12-06 | End: 2020-12-06 | Stop reason: SDUPTHER

## 2020-12-06 RX ORDER — HYDROCODONE BITARTRATE AND ACETAMINOPHEN 5; 325 MG/1; MG/1
1 TABLET ORAL
Status: DISCONTINUED | OUTPATIENT
Start: 2020-12-06 | End: 2020-12-06 | Stop reason: HOSPADM

## 2020-12-06 RX ADMIN — DOCUSATE SODIUM 100 MG: 100 CAPSULE, LIQUID FILLED ORAL at 20:04

## 2020-12-06 RX ADMIN — ACETAMINOPHEN 650 MG: 325 TABLET ORAL at 20:16

## 2020-12-06 RX ADMIN — PROPOFOL 100 MCG/KG/MIN: 10 INJECTION, EMULSION INTRAVENOUS at 07:47

## 2020-12-06 RX ADMIN — PROPOFOL 30 MG: 10 INJECTION, EMULSION INTRAVENOUS at 07:37

## 2020-12-06 RX ADMIN — LIDOCAINE HYDROCHLORIDE 40 MG: 20 INJECTION, SOLUTION EPIDURAL; INFILTRATION; INTRACAUDAL; PERINEURAL at 07:37

## 2020-12-06 RX ADMIN — DIGOXIN 125 MCG: 125 TABLET ORAL at 09:53

## 2020-12-06 RX ADMIN — METOPROLOL SUCCINATE 50 MG: 50 TABLET, FILM COATED, EXTENDED RELEASE ORAL at 20:04

## 2020-12-06 RX ADMIN — SODIUM CHLORIDE, PRESERVATIVE FREE 10 ML: 5 INJECTION INTRAVENOUS at 09:53

## 2020-12-06 RX ADMIN — PROPOFOL 20 MG: 10 INJECTION, EMULSION INTRAVENOUS at 07:52

## 2020-12-06 RX ADMIN — METOPROLOL SUCCINATE 50 MG: 50 TABLET, FILM COATED, EXTENDED RELEASE ORAL at 09:53

## 2020-12-06 RX ADMIN — PROPOFOL 30 MG: 10 INJECTION, EMULSION INTRAVENOUS at 07:40

## 2020-12-06 RX ADMIN — LISINOPRIL 10 MG: 10 TABLET ORAL at 09:53

## 2020-12-06 RX ADMIN — SODIUM CHLORIDE: 9 INJECTION, SOLUTION INTRAVENOUS at 07:32

## 2020-12-06 RX ADMIN — DAPTOMYCIN 450 MG: 500 INJECTION, POWDER, LYOPHILIZED, FOR SOLUTION INTRAVENOUS at 14:49

## 2020-12-06 RX ADMIN — APIXABAN 5 MG: 5 TABLET, FILM COATED ORAL at 20:04

## 2020-12-06 RX ADMIN — ATORVASTATIN CALCIUM 40 MG: 40 TABLET, FILM COATED ORAL at 20:04

## 2020-12-06 RX ADMIN — LISINOPRIL 10 MG: 10 TABLET ORAL at 20:04

## 2020-12-06 RX ADMIN — WATER 2 G: 1 INJECTION INTRAMUSCULAR; INTRAVENOUS; SUBCUTANEOUS at 19:03

## 2020-12-06 RX ADMIN — Medication 500 MG: at 20:05

## 2020-12-06 RX ADMIN — APIXABAN 5 MG: 5 TABLET, FILM COATED ORAL at 09:53

## 2020-12-06 RX ADMIN — Medication 500 MG: at 09:53

## 2020-12-06 ASSESSMENT — PULMONARY FUNCTION TESTS
PIF_VALUE: 0
PIF_VALUE: 0
PIF_VALUE: 1
PIF_VALUE: 0
PIF_VALUE: 0
PIF_VALUE: 1
PIF_VALUE: 0
PIF_VALUE: 0
PIF_VALUE: 1
PIF_VALUE: 0
PIF_VALUE: 1
PIF_VALUE: 0
PIF_VALUE: 1
PIF_VALUE: 0
PIF_VALUE: 0
PIF_VALUE: 1
PIF_VALUE: 0
PIF_VALUE: 1
PIF_VALUE: 0
PIF_VALUE: 0
PIF_VALUE: 1
PIF_VALUE: 1
PIF_VALUE: 0

## 2020-12-06 ASSESSMENT — PAIN DESCRIPTION - ORIENTATION: ORIENTATION: RIGHT;LEFT;MID

## 2020-12-06 ASSESSMENT — PAIN DESCRIPTION - PAIN TYPE: TYPE: CHRONIC PAIN

## 2020-12-06 ASSESSMENT — PAIN SCALES - GENERAL
PAINLEVEL_OUTOF10: 0
PAINLEVEL_OUTOF10: 4
PAINLEVEL_OUTOF10: 0

## 2020-12-06 ASSESSMENT — PAIN - FUNCTIONAL ASSESSMENT: PAIN_FUNCTIONAL_ASSESSMENT: PREVENTS OR INTERFERES SOME ACTIVE ACTIVITIES AND ADLS

## 2020-12-06 ASSESSMENT — PAIN DESCRIPTION - FREQUENCY: FREQUENCY: INTERMITTENT

## 2020-12-06 ASSESSMENT — PAIN DESCRIPTION - DESCRIPTORS: DESCRIPTORS: ACHING;DISCOMFORT;SORE

## 2020-12-06 ASSESSMENT — PAIN DESCRIPTION - ONSET: ONSET: ON-GOING

## 2020-12-06 ASSESSMENT — PAIN DESCRIPTION - LOCATION: LOCATION: GENERALIZED

## 2020-12-06 ASSESSMENT — PAIN DESCRIPTION - PROGRESSION: CLINICAL_PROGRESSION: NOT CHANGED

## 2020-12-06 NOTE — OP NOTE
Operative Note      Patient: Yandel Joseph  YOB: 1955  MRN: [id number]    Date of Procedure: 12/6/2020    Pre-Op Diagnosis: CLABSI and SARS-CoV-2 infection    Post-Op Diagnosis: Same       Procedure(s):  PORT REMOVAL    Surgeon(s):  Li Leiva MD    Assistant:   * No surgical staff found *    Anesthesia: Monitor Anesthesia Care    Estimated Blood Loss (mL): Minimal    Complications: None    Specimens:   ID Type Source Tests Collected by Time Destination   1 : mediport tip Catheter Tip Catheter Tip CULTURE, TIP Li Leiva MD 12/6/2020 0750        Implants:  * No implants in log *      Drains: * No LDAs found *    Findings: As above    Detailed Description of Procedure: The patient was brought to the operative suite and placed on the table in supine position. The patient received anesthesia per the department of anesthesiology. The right chest wall was prepared and draped in a sterile fashion. A 1% lidocaine solution was used to anesthetize the skin. A transverse incision was created over the previous incision site. Hemostasis was achieved with electrocautery after the oxygen source was decreased to room air. The catheter was identified grasped and transected and removed. Pressure was held at the insertion site. Subsequently, the port was removed after the retention sutures were removed. Hemostasis was noted. The incision was closed with interrupted 3-0 nylon suture. A dry dressing was applied. The patient tolerated the procedure well.     Electronically signed by Li Leiva MD on 12/6/2020 at 8:26 AM

## 2020-12-06 NOTE — ANESTHESIA PRE PROCEDURE
Department of Anesthesiology  Preprocedure Note       Name:  Miguel Wolfe   Age:  60 y.o.  :  1955                                          MRN:  [id number]         Date:  2020      Surgeon: Morenita Cota):  Deanna Somers MD    Procedure: Procedure(s):  PORT REMOVAL    Medications prior to admission:   Prior to Admission medications    Medication Sig Start Date End Date Taking? Authorizing Provider   dexamethasone (DECADRON) 4 MG tablet Take 4 mg by mouth See Admin Instructions Take 2 tabs day before, day of, and day after chemotherapy    Historical Provider, MD   ondansetron (ZOFRAN) 8 MG tablet Take 8 mg by mouth every 8 hours as needed for Nausea or Vomiting States she has not needed to take recently.     Historical Provider, MD   docusate sodium (COLACE) 100 MG capsule Take 1 capsule by mouth 2 times daily 20   Jennifer Crook MD   metoprolol succinate (TOPROL XL) 50 MG extended release tablet TAKE 1 TABLET BY MOUTH TWICE A DAY  Patient taking differently: Take 50 mg by mouth 2 times daily Takes along with a 100mg tablet for total of 150mg bid 20   Fermin Jones DO   metoprolol succinate (TOPROL XL) 100 MG extended release tablet Take 1 tablet by mouth 2 times daily  Patient taking differently: Take 100 mg by mouth 2 times daily Takes along with a 50mg for total of 150mg BID 20   Fermin Jones DO   lisinopril (PRINIVIL;ZESTRIL) 10 MG tablet Take 1 tablet by mouth 2 times daily  Patient taking differently: Take 20 mg by mouth 2 times daily  18   Fermin Jones DO   apixaban (ELIQUIS) 5 MG TABS tablet Take by mouth 2 times daily    Historical Provider, MD   acetaminophen (TYLENOL) 325 MG tablet Take 2 tablets by mouth every 4 hours as needed for Pain 18   Myra Resendiz MD   atorvastatin (LIPITOR) 40 MG tablet Take 1 tablet by mouth nightly 18   Myra Resendiz MD   calcium carbonate (OSCAL) 500 MG TABS tablet Take 1 tablet by mouth 2 times daily 18 Shannon Sterling MD       Current medications:    No current facility-administered medications for this visit. No current outpatient medications on file.      Facility-Administered Medications Ordered in Other Visits   Medication Dose Route Frequency Provider Last Rate Last Dose    DAPTOmycin (CUBICIN) 450 mg in sodium chloride 0.9 % 50 mL IVPB  6 mg/kg Intravenous Q24H Sita Goetz MD   Stopped at 12/05/20 1800    0.45 % NaCl with KCl 20 mEq infusion   Intravenous Continuous Monika Howard MD 75 mL/hr at 12/05/20 1805      [START ON 12/6/2020] digoxin (LANOXIN) tablet 125 mcg  125 mcg Oral Daily Monika Howard MD        0.9 % sodium chloride infusion   Intravenous Continuous Monika Howard MD 75 mL/hr at 12/04/20 1224 75 mL/hr at 12/04/20 1224    apixaban (ELIQUIS) tablet 5 mg  5 mg Oral BID Basim Minor MD   5 mg at 12/05/20 0913    atorvastatin (LIPITOR) tablet 40 mg  40 mg Oral Nightly Basim Minor MD   40 mg at 12/04/20 2200    calcium elemental (OSCAL) tablet 500 mg  500 mg Oral BID Basim Minor MD   500 mg at 12/05/20 0913    docusate sodium (COLACE) capsule 100 mg  100 mg Oral BID Basim Minor MD   100 mg at 12/03/20 2226    lisinopril (PRINIVIL;ZESTRIL) tablet 10 mg  10 mg Oral BID Basim Minor MD   10 mg at 12/05/20 0913    metoprolol succinate (TOPROL XL) extended release tablet 50 mg  50 mg Oral BID Basim Minor MD   50 mg at 12/05/20 0913    sodium chloride flush 0.9 % injection 10 mL  10 mL Intravenous 2 times per day Basim Minor MD   10 mL at 12/05/20 0914    sodium chloride flush 0.9 % injection 10 mL  10 mL Intravenous PRN Basim Minor MD        acetaminophen (TYLENOL) tablet 650 mg  650 mg Oral Q6H PRN Basim Minor MD        Or    acetaminophen (TYLENOL) suppository 650 mg  650 mg Rectal Q6H PRN Basim Minor MD        promethazine (PHENERGAN) tablet 12.5 mg  12.5 mg Oral Q6H PRN Basim Minor MD        Or BMI:   Wt Readings from Last 3 Encounters:   12/03/20 173 lb (78.5 kg)   11/13/20 173 lb (78.5 kg)   10/31/19 171 lb (77.6 kg)     There is no height or weight on file to calculate BMI.    CBC:   Lab Results   Component Value Date    WBC 16.4 12/05/2020    RBC 4.28 12/05/2020    HGB 12.4 12/05/2020    HCT 37.8 12/05/2020    MCV 88.3 12/05/2020    RDW 13.7 12/05/2020     12/05/2020       CMP:   Lab Results   Component Value Date     12/05/2020    K 3.1 12/05/2020    K 3.7 12/03/2020     12/05/2020    CO2 22 12/05/2020    BUN 31 12/05/2020    CREATININE 0.8 12/05/2020    GFRAA >60 12/05/2020    LABGLOM >60 12/05/2020    GLUCOSE 137 12/05/2020    PROT 5.4 12/05/2020    CALCIUM 8.7 12/05/2020    BILITOT 0.5 12/05/2020    ALKPHOS 83 12/05/2020    AST 38 12/05/2020    ALT 32 12/05/2020       POC Tests: No results for input(s): POCGLU, POCNA, POCK, POCCL, POCBUN, POCHEMO, POCHCT in the last 72 hours.     Coags:   Lab Results   Component Value Date    PROTIME 13.3 12/03/2020    INR 1.2 12/03/2020    APTT 24.8 12/03/2020       HCG (If Applicable): No results found for: PREGTESTUR, PREGSERUM, HCG, HCGQUANT     ABGs: No results found for: PHART, PO2ART, RXP9CKX, IBU5HNM, BEART, V9DZUGXS     Type & Screen (If Applicable):  No results found for: LABABO, LABRH    Drug/Infectious Status (If Applicable):  No results found for: HIV, HEPCAB    COVID-19 Screening (If Applicable):   Lab Results   Component Value Date    COVID19 DETECTED 12/03/2020     Atrial fibrillation with rapid ventricular response  No previous ECGs available  Confirmed by Scarlet Pathak ([id number]) on 12/3/2020 2:52:42 PM    Anesthesia Evaluation  Patient summary reviewed no history of anesthetic complications:   Airway: Mallampati: II  TM distance: >3 FB     Mouth opening: > = 3 FB Dental:          Pulmonary: breath sounds clear to auscultation      Pneumonia: COVID19 pneumonia. ROS comment: COVID19 INFECTION    CXR 12/2020:  FINDINGS:   Stable implanted central venous catheter on the right.  There is new linear   opacity in the right mid lung which is likely atelectasis.  Stable   cardiomediastinal silhouette.  Neither costophrenic angle is visibly blunted. Cardiovascular:    (+) hypertension:, dysrhythmias (paroxysmal atrial fibrillation, Afib with RVR): atrial fibrillation, hyperlipidemia      ECG reviewed  Rhythm: regular  Rate: normal  Echocardiogram reviewed         Beta Blocker:  Dose within 24 Hrs      ROS comment: EKG 12/3/2020: SVT    Echo 5/2018:  Summary   Normal left ventricle size and systolic function. Mild mitral regurgitation. Moderately dilated left atrium. Neuro/Psych:   (+) CVA (2018 - no residual weakness;  on eliquis ):,    (-) TIA           GI/Hepatic/Renal: Neg GI/Hepatic/Renal ROS            Endo/Other:    (+) blood dyscrasia: anticoagulation therapy, arthritis:., electrolyte abnormalities (Hypernatremia), malignancy/cancer (left breast cancer). ROS comment: Gram positive septicemia Abdominal:           Vascular: negative vascular ROS. Anesthesia Plan      MAC     ASA 4     (Backup GA if needed)  Induction: intravenous. Medical record reviewed and preoperative evaluation updated based on review. Patient not examined due to COVID19 infection. Patient to be evaluated on DOS by anesthesiologist.        Willie Khan MD   12/5/2020     DOS STAFF ADDENDUM:    Patient seen and chart reviewed on DOS. No interval change in history or exam.   I agree with the anesthesia pre-operative assessment written above and have made the appropriate addendums and/or changes. Anesthesia plan discussed and risks/benefits addressed.      Handy Lewis DO  December 6, 2020  6:51 AM

## 2020-12-06 NOTE — PROGRESS NOTES
Subjective: The patient is awake and alert. Port removed today     Objective:    /75   Pulse 97   Temp 98 °F (36.7 °C) (Oral)   Resp 18   Ht 5' 8\" (1.727 m)   Wt 173 lb (78.5 kg)   SpO2 95%   BMI 26.30 kg/m²     In: 380 [P.O.:330]  Out: -   In: 380   Out: -     General appearance: NAD, conversant  HEENT: AT/NC, MMM  Neck: FROM, supple  Lungs: Clear to auscultation- right sided port removed   CV: RRR, no MRGs  Vasc: Radial pulses 2+  Abdomen: Soft, non-tender; no masses or HSM  Extremities: No peripheral edema or digital cyanosis  Skin: no rash, lesions or ulcers  Psych: Alert and oriented to person, place and time  Neuro: Alert and interactive     Recent Labs     12/05/20  1355   WBC 16.4*   HGB 12.4   HCT 37.8          Recent Labs     12/04/20  0450 12/05/20  1355    147*   K 3.6 3.1*   * 115*   CO2 24 22   BUN 58* 31*   CREATININE 1.0 0.8   CALCIUM 8.6 8.7       Assessment:    Principal Problem:    Septic shock with acute organ dysfunction due to Gram positive cocci (HCC)  Active Problems:    PAF (paroxysmal atrial fibrillation) (HCC)    Breast CA (HCC)    SARAN (acute kidney injury) (HCC)    Chemotherapy adverse reaction    Leukocytosis    COVID-19    Atrial fibrillation with RVR (HCC)  Resolved Problems:    * No resolved hospital problems.  *      Plan:    Admit to telemetry for evaluation of atrial fibrillation with RVR and Covid positive patient with known history of breast cancer currently on chemotherapy  Leukocytosis in immunocompromised host consistent with septicemia  Gram-positive cocci in chains times 2 out of 4 bottles-gram-positive septicemia  Infectious disease evaluation  Patient on daptomycin per infectious disease  No plans for remdesivir  Supportive care  removal of right-sided Mediport by general surgery 12/06       Atrial fibrillation with RVR  Patient not able to tolerate Cardizem drip secondary to blood pressure  Digoxin 125 IV daily continue   Eliquis as at home  Blood pressure medications with parameters due  to hypotension  Swallow evaluation to assess for aspiration  N.p.o. until completed     Renal insufficiency/hypernatremia with hypokalemia  IV fluid hydration with D5 20 mEq  K- recheck BMP now      Resume home medications  DVT Prophylaxis   PT/OT  Discharge planning           Renata Nichols MD  5:20 PM  12/6/2020

## 2020-12-06 NOTE — ANESTHESIA POSTPROCEDURE EVALUATION
Department of Anesthesiology  Postprocedure Note    Patient: Bhavesh Smith  MRN: [id number]  YOB: 1955  Date of evaluation: 12/6/2020  Time:  8:13 AM     Procedure Summary     Date:  12/06/20 Room / Location:  [facility] 08 / [address] [facility]    Anesthesia Start:  8282 Anesthesia Stop:      Procedure:  PORT REMOVAL (N/A Chest) Diagnosis:  (?)    Surgeon:  Piedad Lombard, MD Responsible Provider:  Handy Lewis DO    Anesthesia Type:  MAC ASA Status:  4          Anesthesia Type: MAC    Esteban Phase I:      Esteban Phase II:      Last vitals: Reviewed and per EMR flowsheets.        Anesthesia Post Evaluation    Patient location during evaluation: bedside  Patient participation: complete - patient participated  Level of consciousness: awake  Pain score: 1  Airway patency: patent  Nausea & Vomiting: no vomiting and no nausea  Complications: no  Cardiovascular status: hemodynamically stable  Respiratory status: acceptable  Hydration status: stable

## 2020-12-07 LAB
ANION GAP SERPL CALCULATED.3IONS-SCNC: 9 MMOL/L (ref 7–16)
BASOPHILS ABSOLUTE: 0 E9/L (ref 0–0.2)
BASOPHILS RELATIVE PERCENT: 0 % (ref 0–2)
BUN BLDV-MCNC: 14 MG/DL (ref 8–23)
CALCIUM SERPL-MCNC: 8.4 MG/DL (ref 8.6–10.2)
CHLORIDE BLD-SCNC: 115 MMOL/L (ref 98–107)
CO2: 19 MMOL/L (ref 22–29)
CREAT SERPL-MCNC: 0.8 MG/DL (ref 0.5–1)
DOHLE BODIES: ABNORMAL
EOSINOPHILS ABSOLUTE: 0 E9/L (ref 0.05–0.5)
EOSINOPHILS RELATIVE PERCENT: 0 % (ref 0–6)
GFR AFRICAN AMERICAN: >60
GFR NON-AFRICAN AMERICAN: >60 ML/MIN/1.73
GLUCOSE BLD-MCNC: 112 MG/DL (ref 74–99)
HCT VFR BLD CALC: 35.4 % (ref 34–48)
HEMOGLOBIN: 11.3 G/DL (ref 11.5–15.5)
LYMPHOCYTES ABSOLUTE: 1.14 E9/L (ref 1.5–4)
LYMPHOCYTES RELATIVE PERCENT: 8.7 % (ref 20–42)
MCH RBC QN AUTO: 28.6 PG (ref 26–35)
MCHC RBC AUTO-ENTMCNC: 31.9 % (ref 32–34.5)
MCV RBC AUTO: 89.6 FL (ref 80–99.9)
METAMYELOCYTES RELATIVE PERCENT: 3.5 % (ref 0–1)
MONOCYTES ABSOLUTE: 0.51 E9/L (ref 0.1–0.95)
MONOCYTES RELATIVE PERCENT: 3.5 % (ref 2–12)
NEUTROPHILS ABSOLUTE: 11.18 E9/L (ref 1.8–7.3)
NEUTROPHILS RELATIVE PERCENT: 84.3 % (ref 43–80)
NUCLEATED RED BLOOD CELLS: 0 /100 WBC
OVALOCYTES: ABNORMAL
PDW BLD-RTO: 13.7 FL (ref 11.5–15)
PLATELET # BLD: 207 E9/L (ref 130–450)
PMV BLD AUTO: 12.1 FL (ref 7–12)
POIKILOCYTES: ABNORMAL
POTASSIUM SERPL-SCNC: 3.2 MMOL/L (ref 3.5–5)
RBC # BLD: 3.95 E12/L (ref 3.5–5.5)
SODIUM BLD-SCNC: 143 MMOL/L (ref 132–146)
TOXIC GRANULATION: ABNORMAL
WBC # BLD: 12.7 E9/L (ref 4.5–11.5)

## 2020-12-07 PROCEDURE — 6360000002 HC RX W HCPCS: Performed by: SPECIALIST

## 2020-12-07 PROCEDURE — 6360000002 HC RX W HCPCS: Performed by: SURGERY

## 2020-12-07 PROCEDURE — 2580000003 HC RX 258: Performed by: SPECIALIST

## 2020-12-07 PROCEDURE — 6370000000 HC RX 637 (ALT 250 FOR IP): Performed by: SURGERY

## 2020-12-07 PROCEDURE — 80048 BASIC METABOLIC PNL TOTAL CA: CPT

## 2020-12-07 PROCEDURE — 2060000000 HC ICU INTERMEDIATE R&B

## 2020-12-07 PROCEDURE — 85025 COMPLETE CBC W/AUTO DIFF WBC: CPT

## 2020-12-07 PROCEDURE — 36415 COLL VENOUS BLD VENIPUNCTURE: CPT

## 2020-12-07 PROCEDURE — 6370000000 HC RX 637 (ALT 250 FOR IP): Performed by: INTERNAL MEDICINE

## 2020-12-07 PROCEDURE — 2580000003 HC RX 258: Performed by: SURGERY

## 2020-12-07 RX ORDER — POTASSIUM CHLORIDE 750 MG/1
40 TABLET, EXTENDED RELEASE ORAL 2 TIMES DAILY
Status: DISCONTINUED | OUTPATIENT
Start: 2020-12-07 | End: 2020-12-08 | Stop reason: HOSPADM

## 2020-12-07 RX ADMIN — ONDANSETRON 4 MG: 2 INJECTION INTRAMUSCULAR; INTRAVENOUS at 08:34

## 2020-12-07 RX ADMIN — LISINOPRIL 10 MG: 10 TABLET ORAL at 11:22

## 2020-12-07 RX ADMIN — POTASSIUM CHLORIDE 40 MEQ: 750 TABLET, EXTENDED RELEASE ORAL at 15:59

## 2020-12-07 RX ADMIN — DOCUSATE SODIUM 100 MG: 100 CAPSULE, LIQUID FILLED ORAL at 20:18

## 2020-12-07 RX ADMIN — SODIUM CHLORIDE, PRESERVATIVE FREE 10 ML: 5 INJECTION INTRAVENOUS at 11:22

## 2020-12-07 RX ADMIN — Medication 500 MG: at 20:19

## 2020-12-07 RX ADMIN — APIXABAN 5 MG: 5 TABLET, FILM COATED ORAL at 20:18

## 2020-12-07 RX ADMIN — SODIUM CHLORIDE, PRESERVATIVE FREE 10 ML: 5 INJECTION INTRAVENOUS at 20:17

## 2020-12-07 RX ADMIN — Medication 500 MG: at 11:22

## 2020-12-07 RX ADMIN — DIGOXIN 125 MCG: 125 TABLET ORAL at 11:22

## 2020-12-07 RX ADMIN — WATER 2 G: 1 INJECTION INTRAMUSCULAR; INTRAVENOUS; SUBCUTANEOUS at 15:59

## 2020-12-07 RX ADMIN — METOPROLOL SUCCINATE 50 MG: 50 TABLET, FILM COATED, EXTENDED RELEASE ORAL at 11:22

## 2020-12-07 RX ADMIN — METOPROLOL SUCCINATE 50 MG: 50 TABLET, FILM COATED, EXTENDED RELEASE ORAL at 20:19

## 2020-12-07 RX ADMIN — LISINOPRIL 10 MG: 10 TABLET ORAL at 20:18

## 2020-12-07 RX ADMIN — APIXABAN 5 MG: 5 TABLET, FILM COATED ORAL at 11:22

## 2020-12-07 RX ADMIN — DOCUSATE SODIUM 100 MG: 100 CAPSULE, LIQUID FILLED ORAL at 11:22

## 2020-12-07 RX ADMIN — ATORVASTATIN CALCIUM 40 MG: 40 TABLET, FILM COATED ORAL at 20:18

## 2020-12-07 RX ADMIN — POTASSIUM CHLORIDE 40 MEQ: 750 TABLET, EXTENDED RELEASE ORAL at 20:19

## 2020-12-07 ASSESSMENT — PAIN SCALES - GENERAL
PAINLEVEL_OUTOF10: 0

## 2020-12-07 NOTE — PROGRESS NOTES
Appetite: eating < 25% of breakfast, pt reports 25-50% of all meals eaten  Last BM: this aM, loose  Diarrhea/Nausea: nausea, zofran given  SOB: no  SMI: not in room, will bring next round  Cough: no  Oxygen: none  Inctoninent: none  OOB: up in chair now  Swelling: none  Dizziness: no  Glasses/Hearing Aid/Dentures: uses glasses at home, but they are not here.

## 2020-12-07 NOTE — PROGRESS NOTES
Progress Note    Subjective:  Per report, patient is feeling much better. Tolerating diet. S/P port removed. No  or GI blood loss. Objective:    BP (!) 155/80   Pulse 91   Temp 97.7 °F (36.5 °C)   Resp 20   Ht 5' 8\" (1.727 m)   Wt 173 lb (78.5 kg)   SpO2 95%   BMI 26.30 kg/m²     Exam deferred due COVID  CBC:   Lab Results   Component Value Date    WBC 12.7 12/07/2020    RBC 3.95 12/07/2020    HGB 11.3 12/07/2020    HCT 35.4 12/07/2020    MCV 89.6 12/07/2020    MCH 28.6 12/07/2020    MCHC 31.9 12/07/2020    RDW 13.7 12/07/2020     12/07/2020    MPV 12.1 12/07/2020     CMP:    Lab Results   Component Value Date     12/07/2020    K 3.2 12/07/2020    K 3.7 12/03/2020     12/07/2020    CO2 19 12/07/2020    BUN 14 12/07/2020    CREATININE 0.8 12/07/2020    GFRAA >60 12/07/2020    LABGLOM >60 12/07/2020    GLUCOSE 112 12/07/2020    PROT 5.4 12/05/2020    LABALBU 4.1 12/05/2020    CALCIUM 8.4 12/07/2020    BILITOT 0.5 12/05/2020    ALKPHOS 83 12/05/2020    AST 38 12/05/2020    ALT 32 12/05/2020            XR CHEST PORTABLE   Final Result   Mild linear atelectasis on the right.             Current Facility-Administered Medications:     potassium chloride (KLOR-CON M) extended release tablet 40 mEq, 40 mEq, Oral, BID, Gabriel Caba MD    cefTRIAXone (ROCEPHIN) 2 g in sterile water 20 mL IV syringe, 2 g, Intravenous, Q24H, Jose Kuhn MD, 2 g at 12/06/20 1903    digoxin (LANOXIN) tablet 125 mcg, 125 mcg, Oral, Daily, Jovanni Spivey MD, 125 mcg at 12/07/20 1122    apixaban (ELIQUIS) tablet 5 mg, 5 mg, Oral, BID, Jovanni Spivey MD, 5 mg at 12/07/20 1122    atorvastatin (LIPITOR) tablet 40 mg, 40 mg, Oral, Nightly, Jovanni Spivey MD, 40 mg at 12/06/20 2004    calcium elemental (OSCAL) tablet 500 mg, 500 mg, Oral, BID, Jovanni Spivey MD, 500 mg at 12/07/20 1122    docusate sodium (COLACE) capsule 100 mg, 100 mg, Oral, BID, Jovanni Spivey MD, 100 mg at 12/07/20 1122    lisinopril (PRINIVIL;ZESTRIL) tablet 10 mg, 10 mg, Oral, BID, Yung Naqvi MD, 10 mg at 12/07/20 1122    metoprolol succinate (TOPROL XL) extended release tablet 50 mg, 50 mg, Oral, BID, Yung Naqvi MD, 50 mg at 12/07/20 1122    sodium chloride flush 0.9 % injection 10 mL, 10 mL, Intravenous, 2 times per day, Yung Naqvi MD, 10 mL at 12/07/20 1122    sodium chloride flush 0.9 % injection 10 mL, 10 mL, Intravenous, PRN, Yung Naqvi MD    acetaminophen (TYLENOL) tablet 650 mg, 650 mg, Oral, Q6H PRN, 650 mg at 12/06/20 2016 **OR** acetaminophen (TYLENOL) suppository 650 mg, 650 mg, Rectal, Q6H PRN, Yung Naqvi MD    promethazine (PHENERGAN) tablet 12.5 mg, 12.5 mg, Oral, Q6H PRN **OR** ondansetron (ZOFRAN) injection 4 mg, 4 mg, Intravenous, Q6H PRN, Yung Naqvi MD, 4 mg at 12/07/20 4389    Assessment/Plan:    61-year-old woman who is known to Dr. Monica Montoya with clinical stage II ER positive, HER-2/sheba positive left breast cancer, she had a first cycle of neoadjuvant chemotherapy with carboplatin, Taxotere, trastuzumab and Perjeta November 20, 2020. Admitted with fatigue, lethargy and diarrhea. Found to be in atrial fibrillation in the ER. Gram-negative cocci bacteremia and Mediport infection. Patient also asymptomatic COVID-19 infection.       Plan:  Monitor CBC daily  S/P removal of mediport 12/6  ID managing antibiotic coverage  F/U on blood cultures   Continue full supportive care      Electronically signed by LALA Easton NP on 12/7/2020 at 1:56 PM

## 2020-12-07 NOTE — PROGRESS NOTES
Subjective: The patient is awake and alert. Port removed   No acute complaints, feeling much better    Objective:    BP (!) 155/80   Pulse 91   Temp 97.7 °F (36.5 °C)   Resp 20   Ht 5' 8\" (1.727 m)   Wt 173 lb (78.5 kg)   SpO2 95%   BMI 26.30 kg/m²     In: 702 [I.V.:702]  Out: -   In: 702   Out: -     General appearance: NAD, conversant  HEENT: AT/NC, MMM  Neck: FROM, supple  Lungs: Clear to auscultation- right sided port removed   CV: RRR, no MRGs  Vasc: Radial pulses 2+  Abdomen: Soft, non-tender; no masses or HSM  Extremities: No peripheral edema or digital cyanosis  Skin: no rash, lesions or ulcers  Psych: Alert and oriented to person, place and time  Neuro: Alert and interactive     Recent Labs     12/05/20  1355 12/07/20  0405   WBC 16.4* 12.7*   HGB 12.4 11.3*   HCT 37.8 35.4    207       Recent Labs     12/05/20  1355 12/06/20  1915 12/07/20  0405   * 145 143   K 3.1* 3.4* 3.2*   * 114* 115*   CO2 22 20* 19*   BUN 31* 15 14   CREATININE 0.8 0.8 0.8   CALCIUM 8.7 8.6 8.4*       Assessment:    Principal Problem:    Septic shock with acute organ dysfunction due to Gram positive cocci (HCC)  Active Problems:    PAF (paroxysmal atrial fibrillation) (HCC)    Breast CA (HCC)    SARAN (acute kidney injury) (HCC)    Chemotherapy adverse reaction    Leukocytosis    COVID-19    Atrial fibrillation with RVR (HCC)  Resolved Problems:    * No resolved hospital problems.  *      Plan:    Admit to telemetry for evaluation of atrial fibrillation with RVR and Covid positive patient with known history of breast cancer currently on chemotherapy  Leukocytosis in immunocompromised host consistent with septicemia  Gram-positive cocci in chains times 2 out of 4 bottles-gram-positive septicemia  Infectious disease evaluation  Dapto switched to Rocephin  No plans for remdesivir as patient is essentially asymptomatic  Supportive care  removal of right-sided Mediport by general surgery 12/06       Atrial fibrillation with RVR  Patient not able to tolerate Cardizem drip secondary to blood pressure  Digoxin 125 IV daily continue-transition to p.o.  Eliquis as at  home  Blood pressure medications with parameters due  to hypotension  Swallow evaluation to assess for aspiration  N.p.o. until completed     Renal insufficiency/hypernatremia with hypokalemia  IV fluid hydration with D5 20 mEq  K-  Sodium level improved, hypokalemia persists  Supplement potassium     Resume home medications  DVT Prophylaxis   PT/OT  Discharge planning           Liset Bui MD  11:56 AM  12/7/2020

## 2020-12-07 NOTE — PROGRESS NOTES
HGB 11.3 (L) 12/07/2020    HCT 35.4 12/07/2020    MCV 89.6 12/07/2020     12/07/2020     Lab Results   Component Value Date    NEUTROABS 11.18 (H) 12/07/2020    NEUTROABS 18.72 (H) 12/03/2020    NEUTROABS 4.98 11/18/2018     No results found for: CRPHS  Lab Results   Component Value Date    ALT 32 12/05/2020    AST 38 (H) 12/05/2020    ALKPHOS 83 12/05/2020    BILITOT 0.5 12/05/2020     Lab Results   Component Value Date     12/07/2020    K 3.2 12/07/2020    K 3.7 12/03/2020     12/07/2020    CO2 19 12/07/2020    BUN 14 12/07/2020    CREATININE 0.8 12/07/2020    CREATININE 0.8 12/06/2020    CREATININE 0.8 12/05/2020    GFRAA >60 12/07/2020    LABGLOM >60 12/07/2020    GLUCOSE 112 12/07/2020    PROT 5.4 12/05/2020    LABALBU 4.1 12/05/2020    CALCIUM 8.4 12/07/2020    BILITOT 0.5 12/05/2020    ALKPHOS 83 12/05/2020    AST 38 12/05/2020    ALT 32 12/05/2020     Lab Results   Component Value Date    CRP 4.7 (H) 12/04/2020     Lab Results   Component Value Date    SEDRATE 39 (H) 12/04/2020     Radiology:      Microbiology:   Respiratory panel: SARS-CoV-2 detected  Blood cultures 12/3/2020 Streptococcus gordonii, Streptococcus mitis/oralis (Ampicillin intermediate)  Blood cultures 12/4/2020: Negative so far  Catheter tip culture 12/6/2020: Negative so far    No results for input(s): PROCAL in the last 72 hours. ASSESSMENT:  · CLABSI. Status post removal 12/6/2020.   Tip culture negative so far  · Viridans Streptococcus bacteremia associated to the above  · SARS-CoV-2 asymptomatic infection  · Elevation of transaminases associated to SARS-CoV-2 infection  · Atrial fibrillation    PLAN:  · Continue Ceftriaxone  · 2D echo to look for vegetations  · Check repeat blood cultures    Eugenie Do  1:45 PM  12/7/2020

## 2020-12-07 NOTE — PLAN OF CARE
Problem: Falls - Risk of:  Goal: Will remain free from falls  Outcome: Ongoing  Goal: Absence of physical injury  Outcome: Ongoing     Problem: Falls - Risk of:  Goal: Will remain free from falls  Outcome: Ongoing     Problem: Skin Integrity:  Goal: Will show no infection signs and symptoms  Outcome: Ongoing     Problem: Pain:  Goal: Pain level will decrease  Outcome: Ongoing  Goal: Control of acute pain  Outcome: Ongoing     Problem: Airway Clearance - Ineffective  Goal: Achieve or maintain patent airway  Outcome: Ongoing     Problem: Breathing Pattern - Ineffective  Goal: Ability to achieve and maintain a regular respiratory rate  Outcome: Ongoing     Problem: Risk for Fluid Volume Deficit  Goal: Maintain normal heart rhythm  Outcome: Ongoing

## 2020-12-07 NOTE — PROGRESS NOTES
GENERAL SURGERY  DAILY PROGRESS NOTE  12/7/2020    Subjective:  Pt doing well this morning, no acute issues since removal of her port yesterday. Pain is controlled, she is tolerating diet. Objective:  BP (!) 145/84   Pulse 101   Temp 99.7 °F (37.6 °C) (Oral)   Resp 18   Ht 5' 8\" (1.727 m)   Wt 173 lb (78.5 kg)   SpO2 95%   BMI 26.30 kg/m²     GENERAL:  Laying in bed, awake, alert, cooperative, no apparent distress  HEAD: Normocephalic, atraumatic  EYES: No sclera icterus, pupils equal  LUNGS:  No increased work of breathing  CARDIOVASCULAR:  tachycardic  ABDOMEN:  Soft, non-tender, non-distended  EXTREMITIES: No edema or swelling  SKIN: Warm and dry    Assessment/Plan:  60 y.o. female with COVID and possible line infection s/p port removal    - will follow up for suture removal and replacement of port when able   - overall management per primary    Electronically signed by Samra Muhammad MD on 12/7/2020 at 7:35 AM     I agree with the assessment and plan. I will replace the port when medically cleared.

## 2020-12-07 NOTE — CARE COORDINATION
Updated discharge plan of care. Mediport removed on 12/6. Continue iv antibiotics. COVID positive.  Continue to follow-O

## 2020-12-08 VITALS
OXYGEN SATURATION: 95 % | BODY MASS INDEX: 26.22 KG/M2 | WEIGHT: 173 LBS | HEIGHT: 68 IN | DIASTOLIC BLOOD PRESSURE: 80 MMHG | HEART RATE: 105 BPM | TEMPERATURE: 98.9 F | SYSTOLIC BLOOD PRESSURE: 133 MMHG | RESPIRATION RATE: 18 BRPM

## 2020-12-08 LAB
ANION GAP SERPL CALCULATED.3IONS-SCNC: 9 MMOL/L (ref 7–16)
BUN BLDV-MCNC: 8 MG/DL (ref 8–23)
CALCIUM SERPL-MCNC: 8.4 MG/DL (ref 8.6–10.2)
CHLORIDE BLD-SCNC: 115 MMOL/L (ref 98–107)
CO2: 22 MMOL/L (ref 22–29)
CREAT SERPL-MCNC: 0.8 MG/DL (ref 0.5–1)
GFR AFRICAN AMERICAN: >60
GFR NON-AFRICAN AMERICAN: >60 ML/MIN/1.73
GLUCOSE BLD-MCNC: 101 MG/DL (ref 74–99)
POTASSIUM SERPL-SCNC: 3.7 MMOL/L (ref 3.5–5)
SODIUM BLD-SCNC: 146 MMOL/L (ref 132–146)

## 2020-12-08 PROCEDURE — 6370000000 HC RX 637 (ALT 250 FOR IP): Performed by: SPECIALIST

## 2020-12-08 PROCEDURE — 93308 TTE F-UP OR LMTD: CPT

## 2020-12-08 PROCEDURE — 6370000000 HC RX 637 (ALT 250 FOR IP): Performed by: SURGERY

## 2020-12-08 PROCEDURE — 80048 BASIC METABOLIC PNL TOTAL CA: CPT

## 2020-12-08 PROCEDURE — 2580000003 HC RX 258: Performed by: SURGERY

## 2020-12-08 PROCEDURE — 6370000000 HC RX 637 (ALT 250 FOR IP): Performed by: INTERNAL MEDICINE

## 2020-12-08 PROCEDURE — 36415 COLL VENOUS BLD VENIPUNCTURE: CPT

## 2020-12-08 RX ORDER — LEVOFLOXACIN 500 MG/1
500 TABLET, FILM COATED ORAL DAILY
Qty: 10 TABLET | Refills: 0 | Status: SHIPPED | OUTPATIENT
Start: 2020-12-08 | End: 2020-12-18

## 2020-12-08 RX ORDER — LEVOFLOXACIN 500 MG/1
500 TABLET, FILM COATED ORAL DAILY
Status: DISCONTINUED | OUTPATIENT
Start: 2020-12-08 | End: 2020-12-08 | Stop reason: HOSPADM

## 2020-12-08 RX ORDER — DIGOXIN 125 MCG
125 TABLET ORAL DAILY
Qty: 30 TABLET | Refills: 3 | Status: SHIPPED | OUTPATIENT
Start: 2020-12-09 | End: 2021-04-09 | Stop reason: ALTCHOICE

## 2020-12-08 RX ADMIN — SODIUM CHLORIDE, PRESERVATIVE FREE 10 ML: 5 INJECTION INTRAVENOUS at 10:07

## 2020-12-08 RX ADMIN — LISINOPRIL 10 MG: 10 TABLET ORAL at 10:08

## 2020-12-08 RX ADMIN — LEVOFLOXACIN 500 MG: 500 TABLET, FILM COATED ORAL at 14:06

## 2020-12-08 RX ADMIN — NYSTATIN 500000 UNITS: 100000 SUSPENSION ORAL at 14:06

## 2020-12-08 RX ADMIN — METOPROLOL SUCCINATE 50 MG: 50 TABLET, FILM COATED, EXTENDED RELEASE ORAL at 10:08

## 2020-12-08 RX ADMIN — POTASSIUM CHLORIDE 40 MEQ: 750 TABLET, EXTENDED RELEASE ORAL at 10:08

## 2020-12-08 RX ADMIN — APIXABAN 5 MG: 5 TABLET, FILM COATED ORAL at 10:08

## 2020-12-08 RX ADMIN — Medication 500 MG: at 10:08

## 2020-12-08 RX ADMIN — DIGOXIN 125 MCG: 125 TABLET ORAL at 10:07

## 2020-12-08 ASSESSMENT — PAIN SCALES - GENERAL
PAINLEVEL_OUTOF10: 0
PAINLEVEL_OUTOF10: 0

## 2020-12-08 NOTE — CARE COORDINATION
Updated discharge plan of care. Continue iv antibiotics, COVID positive.  Echo for today, plan home with no needs-DIANNA

## 2020-12-08 NOTE — DISCHARGE SUMMARY
Physician Discharge Summary     Patient ID:  Andreia Guevara  [id number]  89 y.o.  1955    Admit date: 12/3/2020    Discharge date and time: 12/8/2020    Admission Diagnoses:   Patient Active Problem List   Diagnosis    TIA (transient ischemic attack)    [facility]    Transient cerebral ischemia    PAF (paroxysmal atrial fibrillation) (HCC)    [facility] CA ([facility]    [facility]    Chemotherapy adverse reaction    Leukocytosis    Septic shock with acute organ dysfunction due to Gram positive cocci (HCC)    COVID-19    Atrial fibrillation with RVR ([facility]       Discharge Diagnoses: Shortness of breath, COVID-19, septicemia, immunocompromise status due to breast cancer    Consults: ID    Procedures: None    Hospital Course: Admit to telemetry for evaluation of atrial fibrillation with RVR and Covid positive patient with known history of breast cancer currently on chemotherapy  Leukocytosis in immunocompromised host consistent with septicemia  Gram-positive cocci in chains times 2 out of 4 bottles-gram-positive septicemia  Echocardiogram being done today to assess for endocarditis/vegetations-unremarkable for endocarditis  Infectious disease following-appreciated and discussed with Dr. Amber Hernandez switched to p.o.  Levaquin for discharge  No plans for remdesivir as patient is essentially asymptomatic  Supportive care  removal of right-sided Mediport by general surgery 12/06       Atrial fibrillation with RVR  Patient not able to tolerate Cardizem drip secondary to blood pressure  Digoxin 125 IV daily continue-transition to p.o.  Eliquis as at  home  Blood pressure medications with parameters due  to hypotension  Swallow evaluation to assess for aspiration  N.p.o. until completed     Renal insufficiency/hypernatremia with hypokalemia  Resolved    Okay for home in stable condition with home health care       Recent Labs     12/07/20  0405   WBC 12.7* HGB 11.3*   HCT 35.4          Recent Labs     12/06/20  1915 12/07/20  0405 12/08/20  0415    143 146   K 3.4* 3.2* 3.7   * 115* 115*   CO2 20* 19* 22   BUN 15 14 8   CREATININE 0.8 0.8 0.8   CALCIUM 8.6 8.4* 8.4*       Xr Chest Portable    Result Date: 12/3/2020  EXAMINATION: ONE XRAY VIEW OF THE CHEST 12/3/2020 12:05 pm COMPARISON: 13 November 2020 HISTORY: ORDERING SYSTEM PROVIDED HISTORY: possible sepsis TECHNOLOGIST PROVIDED HISTORY: Reason for exam:->possible sepsis FINDINGS: Stable implanted central venous catheter on the right. There is new linear opacity in the right mid lung which is likely atelectasis. Stable cardiomediastinal silhouette. Neither costophrenic angle is visibly blunted. Mild linear atelectasis on the right. Discharge Exam:    HEENT: NCAT,  PERRLA, No JVD  Heart:  RRR, no murmurs, gallops, or rubs. Lungs:  CTA bilaterally, no wheeze, rales or rhonchi  Abd: bowel sounds present, nontender, nondistended, no masses  Extrem:  No clubbing, cyanosis, or edema    Disposition: home     Patient Condition at Discharge: Stable    Patient Instructions:      Medication List      START taking these medications    digoxin 125 MCG tablet  Commonly known as:  LANOXIN  Take 1 tablet by mouth daily  Start taking on:  December 9, 2020     levoFLOXacin 500 MG tablet  Commonly known as:  LEVAQUIN  Take 1 tablet by mouth daily for 10 days        CHANGE how you take these medications    lisinopril 10 MG tablet  Commonly known as:  PRINIVIL;ZESTRIL  Take 1 tablet by mouth 2 times daily  What changed:  how much to take     * metoprolol succinate 100 MG extended release tablet  Commonly known as:  TOPROL XL  Take 1 tablet by mouth 2 times daily  What changed:  additional instructions     * metoprolol succinate 50 MG extended release tablet  Commonly known as:  TOPROL XL  TAKE 1 TABLET BY MOUTH TWICE A DAY  What changed:  See the new instructions.          * This list has 2 medication(s) that are the same as other medications prescribed for you. Read the directions carefully, and ask your doctor or other care provider to review them with you. CONTINUE taking these medications    acetaminophen 325 MG tablet  Commonly known as:  TYLENOL  Take 2 tablets by mouth every 4 hours as needed for Pain     atorvastatin 40 MG tablet  Commonly known as:  LIPITOR  Take 1 tablet by mouth nightly     calcium carbonate 500 MG Tabs tablet  Commonly known as:  OSCAL  Take 1 tablet by mouth 2 times daily     dexamethasone 4 MG tablet  Commonly known as:  DECADRON     docusate sodium 100 MG capsule  Commonly known as:  Colace  Take 1 capsule by mouth 2 times daily     Eliquis 5 MG Tabs tablet  Generic drug:  apixaban     ondansetron 8 MG tablet  Commonly known as:  Miguel Stanley           Where to Get Your Medications      These medications were sent to [address] [place] [address] [phone] - [phone]  [address] [place] [address]    Phone:  [phone]   · digoxin 125 MCG tablet  · levoFLOXacin 500 MG tablet       Activity: activity as tolerated  Diet: regular diet    Pt has been advised to: Follow-up with Rowan Bettencourt MD in 1 week.   Follow-up with consultants as recommended by them    Note that over 30 minutes was spent in preparing discharge papers, discussing discharge with patient, medication review, etc.    Signed:  Doreen Patterson MD  12/8/2020  4:58 PM

## 2020-12-08 NOTE — PROGRESS NOTES
Progress Note    Subjective:  Per bedside nurse, patient is feeling much better. On Room air currently. Tolerating diet. S/P port removed on 6th. No  or GI blood loss. Objective:    /76   Pulse 102   Temp 99.1 °F (37.3 °C) (Axillary)   Resp 16   Ht 5' 8\" (1.727 m)   Wt 173 lb (78.5 kg)   SpO2 95%   BMI 26.30 kg/m²     Exam deferred due COVID  CBC:   Lab Results   Component Value Date    WBC 12.7 12/07/2020    RBC 3.95 12/07/2020    HGB 11.3 12/07/2020    HCT 35.4 12/07/2020    MCV 89.6 12/07/2020    MCH 28.6 12/07/2020    MCHC 31.9 12/07/2020    RDW 13.7 12/07/2020     12/07/2020    MPV 12.1 12/07/2020     CMP:    Lab Results   Component Value Date     12/08/2020    K 3.7 12/08/2020    K 3.7 12/03/2020     12/08/2020    CO2 22 12/08/2020    BUN 8 12/08/2020    CREATININE 0.8 12/08/2020    GFRAA >60 12/08/2020    LABGLOM >60 12/08/2020    GLUCOSE 101 12/08/2020    PROT 5.4 12/05/2020    LABALBU 4.1 12/05/2020    CALCIUM 8.4 12/08/2020    BILITOT 0.5 12/05/2020    ALKPHOS 83 12/05/2020    AST 38 12/05/2020    ALT 32 12/05/2020            XR CHEST PORTABLE   Final Result   Mild linear atelectasis on the right.             Current Facility-Administered Medications:     nystatin (MYCOSTATIN) 217987 UNIT/ML suspension 500,000 Units, 5 mL, Oral, 4x Daily, Marium Santos MD    potassium chloride (KLOR-CON M) extended release tablet 40 mEq, 40 mEq, Oral, BID, Marium Santos MD, 40 mEq at 12/08/20 1008    cefTRIAXone (ROCEPHIN) 2 g in sterile water 20 mL IV syringe, 2 g, Intravenous, Q24H, Flaco Cisneros MD, 2 g at 12/07/20 1559    digoxin (LANOXIN) tablet 125 mcg, 125 mcg, Oral, Daily, Yung Naqvi MD, 125 mcg at 12/08/20 1007    apixaban (ELIQUIS) tablet 5 mg, 5 mg, Oral, BID, Yung Naqvi MD, 5 mg at 12/08/20 1008    atorvastatin (LIPITOR) tablet 40 mg, 40 mg, Oral, Nightly, Yung Naqvi MD, 40 mg at 12/07/20 2018    calcium elemental (OSCAL) tablet 500 mg, 500 mg, Oral, BID, Didier Callahan MD, 500 mg at 12/08/20 1008    docusate sodium (COLACE) capsule 100 mg, 100 mg, Oral, BID, Didier Callahan MD, 100 mg at 12/07/20 2018    lisinopril (PRINIVIL;ZESTRIL) tablet 10 mg, 10 mg, Oral, BID, Didier Callahan MD, 10 mg at 12/08/20 1008    metoprolol succinate (TOPROL XL) extended release tablet 50 mg, 50 mg, Oral, BID, Didier Callahan MD, 50 mg at 12/08/20 1008    sodium chloride flush 0.9 % injection 10 mL, 10 mL, Intravenous, 2 times per day, Didier Callahan MD, 10 mL at 12/08/20 1007    sodium chloride flush 0.9 % injection 10 mL, 10 mL, Intravenous, PRN, Didier Callahan MD    acetaminophen (TYLENOL) tablet 650 mg, 650 mg, Oral, Q6H PRN, 650 mg at 12/06/20 2016 **OR** acetaminophen (TYLENOL) suppository 650 mg, 650 mg, Rectal, Q6H PRN, Didier Callahan MD    promethazine (PHENERGAN) tablet 12.5 mg, 12.5 mg, Oral, Q6H PRN **OR** ondansetron (ZOFRAN) injection 4 mg, 4 mg, Intravenous, Q6H PRN, Didier Callahan MD, 4 mg at 12/07/20 1331    Assessment/Plan:    71-year-old woman who is known to Dr. Boris Keith with clinical stage II ER positive, HER-2/sheba positive left breast cancer, she had a first cycle of neoadjuvant chemotherapy with carboplatin, Taxotere, trastuzumab and Perjeta November 20, 2020. Admitted with fatigue, lethargy and diarrhea. Found to be in atrial fibrillation in the ER. Gram-negative cocci bacteremia and Mediport infection. Patient also asymptomatic COVID-19 infection.       Plan:  Monitor CBC daily  S/P removal of mediport 12/6  ID managing antibiotic coverage  F/U on blood cultures   Possibly for discharge today when ECHO is completed and antibiotics are finalized by ID  Follow-up at the [facility] with Obdulia Del Cid  Continue full supportive care      Electronically signed by LALA Hoyos NP on 12/8/2020 at 12:57 PM

## 2020-12-08 NOTE — CARE COORDINATION
Social Work/Discharge Planning:  Called patient and confirmed she plans to return home at discharge. COVID positive 12/3. She states her sister will assist her at home. Discussed home health care option and she is agreeable to [facility] and prefers [facility]. Referral made to angel luis Negron at [facility] and she will review patient information. Patient will need a home health care order prior to discharge. Will continue to follow.   Electronically signed by MILENA Gaing on 12/8/2020 at 3:23 PM

## 2020-12-08 NOTE — PROGRESS NOTES
CLINICAL PHARMACY NOTE: MEDS TO [address] Select Patient?: No  Total # of Prescriptions Filled: 1   The following medications were delivered to the patient:  · Levofloxacin 500  Total # of Interventions Completed: 6  Time Spent (min): 45    Additional Documentation:

## 2020-12-08 NOTE — PROGRESS NOTES
[address] [place] [address] [facility]  Progress Note    SUBJECTIVE:  Chief Complaint   Patient presents with    Shortness of Breath    Emesis     on chemo for breast CA- states SOB, Pharyngitis, cough, emesis. sent in for blood work and covid swab    Pharyngitis     Patient is feeling better. No fevers. Tolerating antibiotics    Review of systems:  As stated above in the chief complaint, otherwise negative. Medications:  Scheduled Meds:   nystatin  5 mL Oral 4x Daily    potassium chloride  40 mEq Oral BID    cefTRIAXone (ROCEPHIN) IV  2 g Intravenous Q24H    digoxin  125 mcg Oral Daily    apixaban  5 mg Oral BID    atorvastatin  40 mg Oral Nightly    calcium elemental  500 mg Oral BID    docusate sodium  100 mg Oral BID    lisinopril  10 mg Oral BID    metoprolol succinate  50 mg Oral BID    sodium chloride flush  10 mL Intravenous 2 times per day     Continuous Infusions:    PRN Meds:sodium chloride flush, acetaminophen **OR** acetaminophen, promethazine **OR** ondansetron    OBJECTIVE:  /76   Pulse 102   Temp 99.1 °F (37.3 °C) (Axillary)   Resp 16   Ht 5' 8\" (1.727 m)   Wt 173 lb (78.5 kg)   SpO2 95%   BMI 26.30 kg/m²   Temp  Av.1 °F (36.7 °C)  Min: 97.6 °F (36.4 °C)  Max: 99.1 °F (37.3 °C)  Constitutional: The patient is sitting up in a chair. Awake and alert. No distress. Skin: Warm and dry. No rashes were noted. HEENT: Round and reactive pupils. Moist mucous membranes. No ulcerations or thrush. Neck: Supple to movements. Chest: No use of accessory muscles to breathe. Symmetrical expansion. No wheezing, crackles or rhonchi. Dressing on right chest.  Cardiovascular: S heart sounds rhythmic and regular. Abdomen: Positive bowel sounds to auscultation. Benign to palpation.    Extremities: No edema  Lines: peripheral    Laboratory and Tests Review:  Lab Results   Component Value Date    WBC 12.7 (H) 2020    WBC 16.4 (H) 2020    WBC 19.7 (H) 12/03/2020    HGB 11.3 (L) 12/07/2020    HCT 35.4 12/07/2020    MCV 89.6 12/07/2020     12/07/2020     Lab Results   Component Value Date    NEUTROABS 11.18 (H) 12/07/2020    NEUTROABS 18.72 (H) 12/03/2020    NEUTROABS 4.98 11/18/2018     No results found for: CRPHS  Lab Results   Component Value Date    ALT 32 12/05/2020    AST 38 (H) 12/05/2020    ALKPHOS 83 12/05/2020    BILITOT 0.5 12/05/2020     Lab Results   Component Value Date     12/08/2020    K 3.7 12/08/2020    K 3.7 12/03/2020     12/08/2020    CO2 22 12/08/2020    BUN 8 12/08/2020    CREATININE 0.8 12/08/2020    CREATININE 0.8 12/07/2020    CREATININE 0.8 12/06/2020    GFRAA >60 12/08/2020    LABGLOM >60 12/08/2020    GLUCOSE 101 12/08/2020    PROT 5.4 12/05/2020    LABALBU 4.1 12/05/2020    CALCIUM 8.4 12/08/2020    BILITOT 0.5 12/05/2020    ALKPHOS 83 12/05/2020    AST 38 12/05/2020    ALT 32 12/05/2020     Lab Results   Component Value Date    CRP 4.7 (H) 12/04/2020     Lab Results   Component Value Date    SEDRATE 39 (H) 12/04/2020     Radiology:      Microbiology:   Respiratory panel: SARS-CoV-2 detected  Blood cultures 12/3/2020 Streptococcus gordonii, Streptococcus mitis/oralis (Ampicillin intermediate)  Blood cultures 12/4/2020: Negative so far  Catheter tip culture 12/6/2020: Negative so far    No results for input(s): PROCAL in the last 72 hours. ASSESSMENT:  · CLABSI. Status post removal 12/6/2020. Tip culture negative so far  · Viridans Streptococcus bacteremia associated to the above  · SARS-CoV-2 asymptomatic infection  · Elevation of transaminases associated to SARS-CoV-2 infection  · Atrial fibrillation    PLAN:  · Change Ceftriaxone over to oral Levofloxacin. Reconciled x10 days  · The patient can be discharged from ID standpoint.   She will need repeat blood cultures after she is done with her antibiotics    Discussed with Dr. Yaritza Khan  12:44 PM  12/8/2020

## 2020-12-08 NOTE — PROGRESS NOTES
Subjective: The patient is awake and alert. Port removed   No acute complaints, feeling much better    Objective:    /76   Pulse 102   Temp 99.1 °F (37.3 °C) (Axillary)   Resp 16   Ht 5' 8\" (1.727 m)   Wt 173 lb (78.5 kg)   SpO2 95%   BMI 26.30 kg/m²     No intake/output data recorded. No intake/output data recorded. General appearance: NAD, conversant  HEENT: AT/NC, MMM  Neck: FROM, supple  Lungs: Clear to auscultation- right sided port removed   CV: RRR, no MRGs  Vasc: Radial pulses 2+  Abdomen: Soft, non-tender; no masses or HSM  Extremities: No peripheral edema or digital cyanosis  Skin: no rash, lesions or ulcers  Psych: Alert and oriented to person, place and time  Neuro: Alert and interactive     Recent Labs     12/05/20  1355 12/07/20  0405   WBC 16.4* 12.7*   HGB 12.4 11.3*   HCT 37.8 35.4    207       Recent Labs     12/06/20  1915 12/07/20  0405 12/08/20  0415    143 146   K 3.4* 3.2* 3.7   * 115* 115*   CO2 20* 19* 22   BUN 15 14 8   CREATININE 0.8 0.8 0.8   CALCIUM 8.6 8.4* 8.4*       Assessment:    Principal Problem:    Septic shock with acute organ dysfunction due to Gram positive cocci (HCC)  Active Problems:    PAF (paroxysmal atrial fibrillation) (HCC)    Breast CA (HCC)    SARAN (acute kidney injury) (HCC)    Chemotherapy adverse reaction    Leukocytosis    COVID-19    Atrial fibrillation with RVR (HCC)  Resolved Problems:    * No resolved hospital problems.  *      Plan:    Admit to telemetry for evaluation of atrial fibrillation with RVR and Covid positive patient with known history of breast cancer currently on chemotherapy  Leukocytosis in immunocompromised host consistent with septicemia  Gram-positive cocci in chains times 2 out of 4 bottles-gram-positive septicemia  Echocardiogram being done today to assess for endocarditis/vegetations  Infectious disease following  Dapto switched to Rocephin  No plans for remdesivir as patient is essentially

## 2020-12-09 ENCOUNTER — TELEPHONE (OUTPATIENT)
Dept: CARDIOLOGY CLINIC | Age: 65
End: 2020-12-09

## 2020-12-09 LAB
BLOOD CULTURE, ROUTINE: NORMAL
CULTURE CATHETER TIP: NORMAL
CULTURE, BLOOD 2: ABNORMAL
CULTURE, BLOOD 2: NORMAL
ORGANISM: ABNORMAL

## 2020-12-09 NOTE — TELEPHONE ENCOUNTER
Patient's sister Sarahi Portillo) called stating patient was just discharged from the hospital.  On chemotherapy, has COVID and went into AFIB. Placed on Digoxin in the hospital.  Sister would like to know if Dr. Franchesca Garnett agrees with the Digoxin and when she should follow-up. Please advise.

## 2020-12-15 NOTE — PROGRESS NOTES
Physician Progress Note      PATIENT:               Harvey Pires  CSN #:                  [id number]  :                       1955  ADMIT DATE:       12/3/2020 12:07 PM  [address] [place] DATE:        2020 7:00 PM  RESPONDING  PROVIDER #:        PADILLA Whitfield MD          QUERY TEXT:    Patient admitted with Sepsis. Noted documentation of CLABSI on  by ID   consultant. If possible, please document in progress notes and discharge   summary:    The medical record reflects the following:  Risk Factors: PICC line  Clinical Indicators: Per ID progress note : \". .. CLABSI. Status post   removal 2020. Tip culture negative so far ? \" Per DC summary \". ..removal   of right-sided Mediport by general surgery ? \"  Treatment: PICC removal, Rocephin, Vancomycin  Options provided:  -- CLABSI confirmed present on admission  -- CLABSI confirmed not present on admission  -- CLABSI ruled out  -- Defer to Infectious Disease consultant documentation regarding CLABSI  -- Other - I will add my own diagnosis  -- Disagree - Not applicable / Not valid  -- Disagree - Clinically unable to determine / Unknown  -- Refer to Clinical Documentation Reviewer    PROVIDER RESPONSE TEXT:    The diagnosis of CLABSI was confirmed as present on admission.     Query created by: Marlee Mckenzie on 2020 10:16 AM      Electronically signed by:  PADILLA Whitfield MD 12/15/2020 9:31 AM

## 2020-12-17 ENCOUNTER — TELEPHONE (OUTPATIENT)
Dept: CARDIOLOGY CLINIC | Age: 65
End: 2020-12-17

## 2020-12-17 LAB
DIGOXIN LEVEL: NORMAL
MAGNESIUM: 1.6 MG/DL

## 2020-12-17 NOTE — TELEPHONE ENCOUNTER
----- Message from Chano Mike, DO sent at 12/17/2020  3:12 PM EST -----  I do not see a dig level here. Please have her get a dig level. Her magnesium is too low. Take Mag-Ox 1 p.o. twice daily for 5 days and then 1 p.o. daily for 1 week. Recheck BMP and mag level in 1 month.

## 2020-12-17 NOTE — TELEPHONE ENCOUNTER
Dig (0.45). Discussed with Dr. Randolph Parham. Left message for patient's sister to call the office.

## 2020-12-18 RX ORDER — MAGNESIUM OXIDE 400 MG/1
TABLET ORAL
Qty: 17 TABLET | Refills: 0 | Status: SHIPPED | OUTPATIENT
Start: 2020-12-18

## 2020-12-18 NOTE — TELEPHONE ENCOUNTER
Patient notified of lab results and Dr. Tessa Talamantes recommendations. Magnesium e-scribed to pharmacy. Order for labs mailed to patient.

## 2020-12-29 ENCOUNTER — HOSPITAL ENCOUNTER (INPATIENT)
Age: 65
LOS: 2 days | Discharge: HOME | DRG: 378 | End: 2021-01-01
Attending: EMERGENCY MEDICINE | Admitting: INTERNAL MEDICINE
Payer: COMMERCIAL

## 2020-12-29 ENCOUNTER — APPOINTMENT (OUTPATIENT)
Dept: GENERAL RADIOLOGY | Age: 65
DRG: 378 | End: 2020-12-29
Payer: COMMERCIAL

## 2020-12-29 ENCOUNTER — TELEPHONE (OUTPATIENT)
Dept: CARDIOLOGY CLINIC | Age: 65
End: 2020-12-29

## 2020-12-29 ENCOUNTER — APPOINTMENT (OUTPATIENT)
Dept: CT IMAGING | Age: 65
DRG: 378 | End: 2020-12-29
Payer: COMMERCIAL

## 2020-12-29 DIAGNOSIS — K92.1 GASTROINTESTINAL HEMORRHAGE WITH MELENA: Primary | ICD-10-CM

## 2020-12-29 DIAGNOSIS — D62 ACUTE BLOOD LOSS ANEMIA: ICD-10-CM

## 2020-12-29 DIAGNOSIS — E86.0 DEHYDRATION: ICD-10-CM

## 2020-12-29 PROBLEM — K92.2 GI BLEED: Status: ACTIVE | Noted: 2020-12-29

## 2020-12-29 LAB
ABO/RH: NORMAL
ALBUMIN SERPL-MCNC: 3.2 G/DL (ref 3.5–5.2)
ALP BLD-CCNC: 128 U/L (ref 35–104)
ALT SERPL-CCNC: 15 U/L (ref 0–32)
AMORPHOUS: PRESENT
ANION GAP SERPL CALCULATED.3IONS-SCNC: 9 MMOL/L (ref 7–16)
ANTIBODY SCREEN: NORMAL
AST SERPL-CCNC: 27 U/L (ref 0–31)
BACTERIA: ABNORMAL /HPF
BASOPHILS ABSOLUTE: 0.03 E9/L (ref 0–0.2)
BASOPHILS RELATIVE PERCENT: 0.2 % (ref 0–2)
BILIRUB SERPL-MCNC: 0.3 MG/DL (ref 0–1.2)
BILIRUBIN URINE: ABNORMAL
BLOOD, URINE: NEGATIVE
BUN BLDV-MCNC: 17 MG/DL (ref 8–23)
CALCIUM SERPL-MCNC: 8.7 MG/DL (ref 8.6–10.2)
CHLORIDE BLD-SCNC: 108 MMOL/L (ref 98–107)
CLARITY: ABNORMAL
CO2: 23 MMOL/L (ref 22–29)
COLOR: ABNORMAL
CREAT SERPL-MCNC: 0.9 MG/DL (ref 0.5–1)
EOSINOPHILS ABSOLUTE: 0.03 E9/L (ref 0.05–0.5)
EOSINOPHILS RELATIVE PERCENT: 0.2 % (ref 0–6)
GFR AFRICAN AMERICAN: >60
GFR NON-AFRICAN AMERICAN: >60 ML/MIN/1.73
GLUCOSE BLD-MCNC: 148 MG/DL (ref 74–99)
GLUCOSE URINE: NEGATIVE MG/DL
HCT VFR BLD CALC: 22.3 % (ref 34–48)
HEMOGLOBIN: 6.7 G/DL (ref 11.5–15.5)
IMMATURE GRANULOCYTES #: 0.05 E9/L
IMMATURE GRANULOCYTES %: 0.4 % (ref 0–5)
INR BLD: 1.5
KETONES, URINE: NEGATIVE MG/DL
LACTIC ACID: 2.3 MMOL/L (ref 0.5–2.2)
LEUKOCYTE ESTERASE, URINE: ABNORMAL
LIPASE: 102 U/L (ref 13–60)
LYMPHOCYTES ABSOLUTE: 1.06 E9/L (ref 1.5–4)
LYMPHOCYTES RELATIVE PERCENT: 8.4 % (ref 20–42)
MCH RBC QN AUTO: 30.2 PG (ref 26–35)
MCHC RBC AUTO-ENTMCNC: 30 % (ref 32–34.5)
MCV RBC AUTO: 100.5 FL (ref 80–99.9)
MONOCYTES ABSOLUTE: 0.66 E9/L (ref 0.1–0.95)
MONOCYTES RELATIVE PERCENT: 5.2 % (ref 2–12)
MUCUS: PRESENT /LPF
NEUTROPHILS ABSOLUTE: 10.79 E9/L (ref 1.8–7.3)
NEUTROPHILS RELATIVE PERCENT: 85.6 % (ref 43–80)
NITRITE, URINE: NEGATIVE
PDW BLD-RTO: 19.1 FL (ref 11.5–15)
PH UA: 5.5 (ref 5–9)
PLATELET # BLD: 218 E9/L (ref 130–450)
PMV BLD AUTO: 11.2 FL (ref 7–12)
POTASSIUM REFLEX MAGNESIUM: 4.7 MMOL/L (ref 3.5–5)
PRO-BNP: 1334 PG/ML (ref 0–125)
PROTEIN UA: NEGATIVE MG/DL
PROTHROMBIN TIME: 17.6 SEC (ref 9.3–12.4)
RBC # BLD: 2.22 E12/L (ref 3.5–5.5)
RBC UA: ABNORMAL /HPF (ref 0–2)
SODIUM BLD-SCNC: 140 MMOL/L (ref 132–146)
SPECIFIC GRAVITY UA: 1.02 (ref 1–1.03)
TOTAL PROTEIN: 5.9 G/DL (ref 6.4–8.3)
TROPONIN: <0.01 NG/ML (ref 0–0.03)
UROBILINOGEN, URINE: 0.2 E.U./DL
WBC # BLD: 12.6 E9/L (ref 4.5–11.5)
WBC UA: ABNORMAL /HPF (ref 0–5)

## 2020-12-29 PROCEDURE — 6370000000 HC RX 637 (ALT 250 FOR IP): Performed by: INTERNAL MEDICINE

## 2020-12-29 PROCEDURE — 6360000002 HC RX W HCPCS: Performed by: EMERGENCY MEDICINE

## 2020-12-29 PROCEDURE — 86850 RBC ANTIBODY SCREEN: CPT

## 2020-12-29 PROCEDURE — P9016 RBC LEUKOCYTES REDUCED: HCPCS

## 2020-12-29 PROCEDURE — 83605 ASSAY OF LACTIC ACID: CPT

## 2020-12-29 PROCEDURE — 85025 COMPLETE CBC W/AUTO DIFF WBC: CPT

## 2020-12-29 PROCEDURE — 96360 HYDRATION IV INFUSION INIT: CPT

## 2020-12-29 PROCEDURE — 2580000003 HC RX 258: Performed by: EMERGENCY MEDICINE

## 2020-12-29 PROCEDURE — 85610 PROTHROMBIN TIME: CPT

## 2020-12-29 PROCEDURE — 71046 X-RAY EXAM CHEST 2 VIEWS: CPT

## 2020-12-29 PROCEDURE — 86900 BLOOD TYPING SEROLOGIC ABO: CPT

## 2020-12-29 PROCEDURE — 93005 ELECTROCARDIOGRAM TRACING: CPT | Performed by: PHYSICIAN ASSISTANT

## 2020-12-29 PROCEDURE — 86901 BLOOD TYPING SEROLOGIC RH(D): CPT

## 2020-12-29 PROCEDURE — 87040 BLOOD CULTURE FOR BACTERIA: CPT

## 2020-12-29 PROCEDURE — 83880 ASSAY OF NATRIURETIC PEPTIDE: CPT

## 2020-12-29 PROCEDURE — 96365 THER/PROPH/DIAG IV INF INIT: CPT

## 2020-12-29 PROCEDURE — 96361 HYDRATE IV INFUSION ADD-ON: CPT

## 2020-12-29 PROCEDURE — 36415 COLL VENOUS BLD VENIPUNCTURE: CPT

## 2020-12-29 PROCEDURE — 86923 COMPATIBILITY TEST ELECTRIC: CPT

## 2020-12-29 PROCEDURE — 81001 URINALYSIS AUTO W/SCOPE: CPT

## 2020-12-29 PROCEDURE — 2580000003 HC RX 258: Performed by: INTERNAL MEDICINE

## 2020-12-29 PROCEDURE — 70450 CT HEAD/BRAIN W/O DYE: CPT

## 2020-12-29 PROCEDURE — 80053 COMPREHEN METABOLIC PANEL: CPT

## 2020-12-29 PROCEDURE — G0378 HOSPITAL OBSERVATION PER HR: HCPCS

## 2020-12-29 PROCEDURE — 83690 ASSAY OF LIPASE: CPT

## 2020-12-29 PROCEDURE — 99283 EMERGENCY DEPT VISIT LOW MDM: CPT

## 2020-12-29 PROCEDURE — C9113 INJ PANTOPRAZOLE SODIUM, VIA: HCPCS | Performed by: EMERGENCY MEDICINE

## 2020-12-29 PROCEDURE — 84484 ASSAY OF TROPONIN QUANT: CPT

## 2020-12-29 RX ORDER — 0.9 % SODIUM CHLORIDE 0.9 %
20 INTRAVENOUS SOLUTION INTRAVENOUS ONCE
Status: DISCONTINUED | OUTPATIENT
Start: 2020-12-29 | End: 2021-01-01 | Stop reason: HOSPADM

## 2020-12-29 RX ORDER — POLYETHYLENE GLYCOL 3350 17 G/17G
17 POWDER, FOR SOLUTION ORAL DAILY PRN
Status: DISCONTINUED | OUTPATIENT
Start: 2020-12-29 | End: 2021-01-01 | Stop reason: HOSPADM

## 2020-12-29 RX ORDER — ONDANSETRON 2 MG/ML
4 INJECTION INTRAMUSCULAR; INTRAVENOUS EVERY 6 HOURS PRN
Status: DISCONTINUED | OUTPATIENT
Start: 2020-12-29 | End: 2020-12-29

## 2020-12-29 RX ORDER — SODIUM CHLORIDE 0.9 % (FLUSH) 0.9 %
10 SYRINGE (ML) INJECTION EVERY 12 HOURS SCHEDULED
Status: DISCONTINUED | OUTPATIENT
Start: 2020-12-29 | End: 2021-01-01 | Stop reason: HOSPADM

## 2020-12-29 RX ORDER — SODIUM CHLORIDE, SODIUM LACTATE, POTASSIUM CHLORIDE, CALCIUM CHLORIDE 600; 310; 30; 20 MG/100ML; MG/100ML; MG/100ML; MG/100ML
1000 INJECTION, SOLUTION INTRAVENOUS ONCE
Status: DISCONTINUED | OUTPATIENT
Start: 2020-12-29 | End: 2020-12-29

## 2020-12-29 RX ORDER — ACETAMINOPHEN 325 MG/1
650 TABLET ORAL EVERY 6 HOURS PRN
Status: DISCONTINUED | OUTPATIENT
Start: 2020-12-29 | End: 2021-01-01 | Stop reason: HOSPADM

## 2020-12-29 RX ORDER — SODIUM CHLORIDE, SODIUM LACTATE, POTASSIUM CHLORIDE, CALCIUM CHLORIDE 600; 310; 30; 20 MG/100ML; MG/100ML; MG/100ML; MG/100ML
INJECTION, SOLUTION INTRAVENOUS CONTINUOUS
Status: DISCONTINUED | OUTPATIENT
Start: 2020-12-29 | End: 2020-12-31

## 2020-12-29 RX ORDER — DIGOXIN 125 MCG
125 TABLET ORAL DAILY
Status: DISCONTINUED | OUTPATIENT
Start: 2020-12-29 | End: 2021-01-01 | Stop reason: HOSPADM

## 2020-12-29 RX ORDER — ATORVASTATIN CALCIUM 40 MG/1
40 TABLET, FILM COATED ORAL NIGHTLY
Status: DISCONTINUED | OUTPATIENT
Start: 2020-12-29 | End: 2021-01-01 | Stop reason: HOSPADM

## 2020-12-29 RX ORDER — SODIUM CHLORIDE 0.9 % (FLUSH) 0.9 %
10 SYRINGE (ML) INJECTION PRN
Status: DISCONTINUED | OUTPATIENT
Start: 2020-12-29 | End: 2021-01-01 | Stop reason: HOSPADM

## 2020-12-29 RX ORDER — PANTOPRAZOLE SODIUM 40 MG/10ML
40 INJECTION, POWDER, LYOPHILIZED, FOR SOLUTION INTRAVENOUS 2 TIMES DAILY
Status: DISCONTINUED | OUTPATIENT
Start: 2020-12-29 | End: 2020-12-31

## 2020-12-29 RX ORDER — ACETAMINOPHEN 650 MG/1
650 SUPPOSITORY RECTAL EVERY 6 HOURS PRN
Status: DISCONTINUED | OUTPATIENT
Start: 2020-12-29 | End: 2021-01-01 | Stop reason: HOSPADM

## 2020-12-29 RX ORDER — PROMETHAZINE HYDROCHLORIDE 25 MG/1
12.5 TABLET ORAL EVERY 6 HOURS PRN
Status: DISCONTINUED | OUTPATIENT
Start: 2020-12-29 | End: 2020-12-29

## 2020-12-29 RX ORDER — 0.9 % SODIUM CHLORIDE 0.9 %
1000 INTRAVENOUS SOLUTION INTRAVENOUS ONCE
Status: COMPLETED | OUTPATIENT
Start: 2020-12-29 | End: 2020-12-29

## 2020-12-29 RX ORDER — METOPROLOL SUCCINATE 25 MG/1
25 TABLET, EXTENDED RELEASE ORAL 2 TIMES DAILY
Status: DISCONTINUED | OUTPATIENT
Start: 2020-12-29 | End: 2021-01-01 | Stop reason: HOSPADM

## 2020-12-29 RX ORDER — LISINOPRIL 20 MG/1
20 TABLET ORAL 2 TIMES DAILY
COMMUNITY

## 2020-12-29 RX ADMIN — SODIUM CHLORIDE, PRESERVATIVE FREE 10 ML: 5 INJECTION INTRAVENOUS at 20:45

## 2020-12-29 RX ADMIN — METOPROLOL SUCCINATE 25 MG: 25 TABLET, EXTENDED RELEASE ORAL at 20:43

## 2020-12-29 RX ADMIN — SODIUM CHLORIDE, POTASSIUM CHLORIDE, SODIUM LACTATE AND CALCIUM CHLORIDE: 600; 310; 30; 20 INJECTION, SOLUTION INTRAVENOUS at 20:44

## 2020-12-29 RX ADMIN — ATORVASTATIN CALCIUM 40 MG: 40 TABLET, FILM COATED ORAL at 20:43

## 2020-12-29 RX ADMIN — DIGOXIN 125 MCG: 125 TABLET ORAL at 20:43

## 2020-12-29 RX ADMIN — SODIUM CHLORIDE 1000 ML: 9 INJECTION, SOLUTION INTRAVENOUS at 16:31

## 2020-12-29 RX ADMIN — PANTOPRAZOLE SODIUM 80 MG: 40 INJECTION, POWDER, FOR SOLUTION INTRAVENOUS at 18:47

## 2020-12-29 ASSESSMENT — ENCOUNTER SYMPTOMS
BACK PAIN: 0
EYE PAIN: 0
ABDOMINAL PAIN: 0
SHORTNESS OF BREATH: 0
NAUSEA: 0
VOMITING: 0
SORE THROAT: 0
DIARRHEA: 1

## 2020-12-29 ASSESSMENT — PAIN SCALES - GENERAL: PAINLEVEL_OUTOF10: 0

## 2020-12-29 NOTE — ED PROVIDER NOTES
Patient is 52-year-old female presented to emergency department due to diarrhea and fatigue with lightheadedness. Patient states her diarrhea started yesterday is black in nature and states she has had several episodes of diarrhea today. She has no history of GI bleed per report. Patient does have a history of breast cancer stage III which she is undergoing chemotherapy treatment for. Patient states the black loose stools began yesterday and have had several episodes since yesterday. Patient states the symptoms have been gradually worsening. She describes some associated lightheadedness dizziness and fatigue. Patient denies any nausea, vomiting, abdominal pain. Patient also notes having a fall yesterday which he fell onto her chair. Patient denies any smoking, alcohol use, drug use. States her last chemotherapy treatment was in November. The history is provided by the patient. No  was used. Diarrhea  Quality:  Black and tarry  Severity:  Moderate  Onset quality:  Sudden  Duration:  1 day  Timing:  Constant  Progression:  Worsening  Relieved by:  Nothing  Worsened by:  Nothing  Ineffective treatments:  None tried  Associated symptoms: no abdominal pain, no chills, no fever, no headaches and no vomiting           Review of Systems   Constitutional: Positive for fatigue. Negative for chills and fever. HENT: Negative for ear pain and sore throat. Eyes: Negative for pain. Respiratory: Negative for shortness of breath. Cardiovascular: Negative for chest pain. Gastrointestinal: Positive for diarrhea. Negative for abdominal pain, nausea and vomiting. Genitourinary: Negative for flank pain and pelvic pain. Musculoskeletal: Negative for back pain and neck pain. Skin: Negative for rash. Neurological: Positive for light-headedness. Negative for headaches. Physical Exam  Vitals signs and nursing note reviewed.    Constitutional: General: She is not in acute distress. Appearance: She is well-developed and normal weight. She is ill-appearing. HENT:      Head: Normocephalic and atraumatic. Right Ear: External ear normal.      Left Ear: External ear normal.      Nose: Nose normal.      Mouth/Throat:      Mouth: Mucous membranes are moist.      Pharynx: Oropharynx is clear. Eyes:      Pupils: Pupils are equal, round, and reactive to light. Neck:      Musculoskeletal: Normal range of motion and neck supple. Cardiovascular:      Rate and Rhythm: Regular rhythm. Tachycardia present. Heart sounds: Normal heart sounds. Pulmonary:      Effort: Pulmonary effort is normal. No respiratory distress. Breath sounds: Normal breath sounds. Abdominal:      General: Abdomen is flat. There is no distension. Palpations: Abdomen is soft. Tenderness: There is no abdominal tenderness. Genitourinary:     Rectum: Guaiac result positive. Musculoskeletal:         General: No swelling or tenderness. Skin:     General: Skin is warm and dry. Coloration: Skin is pale. Findings: No rash. Neurological:      Mental Status: She is alert and oriented to person, place, and time. Cranial Nerves: No cranial nerve deficit. Procedures     EKG: This EKG is signed and interpreted by me.     Rate: 124  Rhythm: Atrial fibrillation and Rapid ventricular response  Interpretation: atrial fibrillation (chronic)  Comparison: stable as compared to patient's most recent EKG       MDM  Number of Diagnoses or Management Options  Acute blood loss anemia  Gastrointestinal hemorrhage with melena Diagnosis management comments: Patient presented with signs symptoms of a GI bleed. She had black tarry stool which was Hemoccult positive. Patient's hemoglobin was found to be decreased compared to her last visit and patient was anemic requiring blood transfusion. This transfusion was ordered and will be started when arrives. Patient was given 1 L of fluids and her vitals improved originally she was semistable with a blood pressure systolic of 85. This improved to 113/57 with given 1 L of fluids. Patient did appear dry on physical exam as well. She will be admitted to hospital further evaluation for GI bleed and anemia due to hemorrhage. Amount and/or Complexity of Data Reviewed  Decide to obtain previous medical records or to obtain history from someone other than the patient: yes         ED Course as of Dec 29 1838   Tue Dec 29, 2020   1552 EKG: This EKG is signed and interpreted by me. Rate: 124  Rhythm: Atrial fibrillation  Interpretation: A. Fib with RVR, nonspecific T wave abnormalities, low voltage QRS, Qtc 502  Comparison: no previous EKG available      [JA]   1601 Discussed risks versus benefits of blood transfusion. Discussed risks of infection, allergic reaction, and pulmonary edema. Discussed that I believe benefits outweigh risks. Patient and family demonstrate understanding and agreeable to blood transfusion. [JA]      ED Course User Index  [JA] Irina Guerrero MD      ED Course as of Dec 29 1838   Tue Dec 29, 2020   1552 EKG: This EKG is signed and interpreted by me. Rate: 124  Rhythm: Atrial fibrillation  Interpretation: A.  Fib with RVR, nonspecific T wave abnormalities, low voltage QRS, Qtc 502  Comparison: no previous EKG available      [JA] 1601 Discussed risks versus benefits of blood transfusion. Discussed risks of infection, allergic reaction, and pulmonary edema. Discussed that I believe benefits outweigh risks. Patient and family demonstrate understanding and agreeable to blood transfusion. [JA]      ED Course User Index  [COMFORT] Michaelle Morelos MD       --------------------------------------------- PAST HISTORY ---------------------------------------------  Past Medical History:  has a past medical history of Cancer ([facility] [address] Hyperlipidemia, Hypertension, Osteoarthritis, and Stroke due to embolism of right posterior cerebral artery ([facility] [address] Past Surgical History:  has a past surgical history that includes [place] tooth extraction; Tonsillectomy; Cervix surgery; US BREAST BIOPSY W LOC DEVICE 1ST LESION LEFT (9/30/2020); [facility] US GUIDED PLACE LOC DEVICE PERC 1ST LESION (9/30/2020); [address] Surgery (N/A, 11/13/2020); and Port Surgery (N/A, 12/6/2020). Social History:  reports that she has never smoked. She has never used smokeless tobacco. She reports previous alcohol use. She reports that she does not use drugs. Family History: family history includes Heart Disease in her mother. The patients home medications have been reviewed.     Allergies: Adhesive tape    -------------------------------------------------- RESULTS -------------------------------------------------    LABS:  Results for orders placed or performed during the hospital encounter of 12/29/20   CBC Auto Differential   Result Value Ref Range    WBC 12.6 (H) 4.5 - 11.5 E9/L    RBC 2.22 (L) 3.50 - 5.50 E12/L    Hemoglobin 6.7 (LL) 11.5 - 15.5 g/dL    Hematocrit 22.3 (L) 34.0 - 48.0 %    .5 (H) 80.0 - 99.9 fL    MCH 30.2 26.0 - 35.0 pg    MCHC 30.0 (L) 32.0 - 34.5 %    RDW 19.1 (H) 11.5 - 15.0 fL    Platelets 965 028 - 297 E9/L    MPV 11.2 7.0 - 12.0 fL    Neutrophils % 85.6 (H) 43.0 - 80.0 %    Immature Granulocytes % 0.4 0.0 - 5.0 % Lymphocytes % 8.4 (L) 20.0 - 42.0 %    Monocytes % 5.2 2.0 - 12.0 %    Eosinophils % 0.2 0.0 - 6.0 %    Basophils % 0.2 0.0 - 2.0 %    Neutrophils Absolute 10.79 (H) 1.80 - 7.30 E9/L    Immature Granulocytes # 0.05 E9/L    Lymphocytes Absolute 1.06 (L) 1.50 - 4.00 E9/L    Monocytes Absolute 0.66 0.10 - 0.95 E9/L    Eosinophils Absolute 0.03 (L) 0.05 - 0.50 E9/L    Basophils Absolute 0.03 0.00 - 0.20 E9/L   Comprehensive Metabolic Panel w/ Reflex to MG   Result Value Ref Range    Sodium 140 132 - 146 mmol/L    Potassium reflex Magnesium 4.7 3.5 - 5.0 mmol/L    Chloride 108 (H) 98 - 107 mmol/L    CO2 23 22 - 29 mmol/L    Anion Gap 9 7 - 16 mmol/L    Glucose 148 (H) 74 - 99 mg/dL    BUN 17 8 - 23 mg/dL    CREATININE 0.9 0.5 - 1.0 mg/dL    GFR Non-African American >60 >=60 mL/min/1.73    GFR African American >60     Calcium 8.7 8.6 - 10.2 mg/dL    Total Protein 5.9 (L) 6.4 - 8.3 g/dL    Alb 3.2 (L) 3.5 - 5.2 g/dL    Total Bilirubin 0.3 0.0 - 1.2 mg/dL    Alkaline Phosphatase 128 (H) 35 - 104 U/L    ALT 15 0 - 32 U/L    AST 27 0 - 31 U/L   Troponin   Result Value Ref Range    Troponin <0.01 0.00 - 0.03 ng/mL   Lipase   Result Value Ref Range    Lipase 102 (H) 13 - 60 U/L   Lactic Acid, Plasma   Result Value Ref Range    Lactic Acid 2.3 (H) 0.5 - 2.2 mmol/L   Urinalysis   Result Value Ref Range    Color, UA DARK YELLOW (A) Straw/Yellow    Clarity, UA CLOUDY (A) Clear    Glucose, Ur Negative Negative mg/dL    Bilirubin Urine SMALL (A) Negative    Ketones, Urine Negative Negative mg/dL    Specific Gravity, UA 1.025 1.005 - 1.030    Blood, Urine Negative Negative    pH, UA 5.5 5.0 - 9.0    Protein, UA Negative Negative mg/dL    Urobilinogen, Urine 0.2 <2.0 E.U./dL    Nitrite, Urine Negative Negative    Leukocyte Esterase, Urine TRACE (A) Negative   Brain Natriuretic Peptide   Result Value Ref Range    Pro-BNP 1,334 (H) 0 - 125 pg/mL   Protime-INR   Result Value Ref Range    Protime 17.6 (H) 9.3 - 12.4 sec    INR 1.5 Microscopic Urinalysis   Result Value Ref Range    Mucus, UA Present (A) None Seen /LPF    WBC, UA 10-20 (A) 0 - 5 /HPF    RBC, UA NONE 0 - 2 /HPF    Bacteria, UA MANY (A) None Seen /HPF    Amorphous, UA PRESENT    EKG 12 Lead   Result Value Ref Range    Ventricular Rate 124 BPM    Atrial Rate 326 BPM    QRS Duration 78 ms    Q-T Interval 350 ms    QTc Calculation (Bazett) 502 ms    R Axis 88 degrees    T Axis -117 degrees   PREPARE RBC (CROSSMATCH), 1 Units   Result Value Ref Range    Product Code Blood Bank [id number]     Description Blood Bank Red Blood Cells, Leuko-reduced     Unit Number [id number]     Dispense Status Blood Bank selected    TYPE AND SCREEN   Result Value Ref Range    ABO/Rh AB POS     Antibody Screen NEG        RADIOLOGY:  CT Head WO Contrast   Final Result   1. Area of low attenuation within the right occipital lobe measuring 1.3 x   1.6 x 2.0 cm likely representing an old occipital lobe infarct. Metastatic   disease cannot be completely excluded given the patient's history of breast   CA. This finding was not present on the patient's prior study of 02/09/2018. This finding should be further evaluated with either an MRI of the brain with   and without contrast or CT scan of the brain with IV contrast to evaluate for   any peripheral enhancement. XR CHEST (2 VW)   Final Result   Patchy multifocal bilateral pulmonary infiltrates. ------------------------- NURSING NOTES AND VITALS REVIEWED ---------------------------  Date / Time Roomed:  12/29/2020  3:08 PM  ED Bed Assignment:  27/27    The nursing notes within the ED encounter and vital signs as below have been reviewed.      Patient Vitals for the past 24 hrs:   BP Temp Temp src Pulse Resp SpO2 Height Weight   12/29/20 1827 (!) 113/57 98.1 °F (36.7 °C)  58 16 98 %     12/29/20 1708 105/62   102 16 98 %     12/29/20 1400 (!) 80/48 97.8 °F (36.6 °C) Temporal 111 16 98 % 5' 8\" (1.727 m) 160 lb (72.6 kg) Oxygen Saturation Interpretation: Normal    ------------------------------------------ PROGRESS NOTES ------------------------------------------  Re-evaluation(s):  Time: 1600  Patients symptoms show no change  Repeat physical examination is improved    Counseling:  I have spoken with the patient and discussed todays results, in addition to providing specific details for the plan of care and counseling regarding the diagnosis and prognosis. Their questions are answered at this time and they are agreeable with the plan of admission.    --------------------------------- ADDITIONAL PROVIDER NOTES ---------------------------------  Consultations:   Spoke with Dr. Orlin Day. Discussed case. They will admit the patient. This patient's ED course included: a personal history and physicial examination, re-evaluation prior to disposition, multiple bedside re-evaluations, IV medications, cardiac monitoring, continuous pulse oximetry and complex medical decision making and emergency management    This patient has remained hemodynamically stable during their ED course. Diagnosis:  1. Gastrointestinal hemorrhage with melena    2. Acute blood loss anemia    3.  Dehydration        Disposition:  Patient's disposition: Admit to telemetry  Patient's condition is Stable            Jj Camargo DO  Resident  12/29/20 [id number]

## 2020-12-29 NOTE — PROGRESS NOTES
Database complete. Medications reconciled. Care plans and education initiated. Last chemotherapy was 11/20/20. Oncologist is Dr. Lizzette Amato. Cardiologist is Dr. Brian Lane. Mediport removal 12/5/20. Pt states she was tested for coronavirus on 12/22/20 and has yet to receive results. Has HHC / PT coming into the home.

## 2020-12-29 NOTE — ED NOTES
FIRST PROVIDER CONTACT ASSESSMENT NOTE      Department of Emergency Medicine   12/29/20  1:59 PM EST    Chief Complaint: Diarrhea (symptoms x2 days, sent by [facility], going through chemo for breast CA) and Hypotension    History of Present Illness:   Silke Roque is a 60 y.o. female who presents to the ED for diarrhea. Patient is currently undergoing chemo for breast cancer. She was tested for COVID but the test is still pending. She was sent by [facility]. Patient is also hypotensive. 80/48 at triage     Focused Physical Exam:  VS:  BP (!) 80/48   Pulse 111   Temp 97.8 °F (36.6 °C) (Temporal)   Resp 16   Ht 5' 8\" (1.727 m)   Wt 160 lb (72.6 kg)   SpO2 98%   BMI 24.33 kg/m²      General: Alert and in no apparent distress     Medical History:  has a past medical history of Cancer ([facility] [address] Hyperlipidemia, Hypertension, Osteoarthritis, and Stroke due to embolism of right posterior cerebral artery ([facility] [address] Surgical History:  has a past surgical history that includes [place] tooth extraction; Tonsillectomy; Cervix surgery; US BREAST BIOPSY W LOC DEVICE 1ST LESION LEFT (9/30/2020); [facility] US GUIDED PLACE LOC DEVICE PERC 1ST LESION (9/30/2020); [facility] Surgery (N/A, 11/13/2020); and Port Surgery (N/A, 12/6/2020). Social History:  reports that she has never smoked. She has never used smokeless tobacco. She reports previous alcohol use. She reports that she does not use drugs. Family History: family history includes Heart Disease in her mother.     *ALLERGIES*     Adhesive tape     Initial Plan of Care:  Initiate Treatment-Testing, Proceed toTreatment Area When Bed Available for ED Attending/MLP to Continue Care    -----------------END OF FIRST PROVIDER CONTACT ASSESSMENT NOTE--------------  Electronically signed by April Dixon PA-C   DD: 12/29/20         April Dixon PA-C  12/29/20 [id number]

## 2020-12-29 NOTE — ED NOTES
Report faxed. Sukhwinder David at ext [phone] confirmed receipt.  Patient updated and prepared to be transported via cart and monitor     Rosie Dye RN  12/29/20 4471

## 2020-12-29 NOTE — TELEPHONE ENCOUNTER
Called patient to check status of BP/P reading. She states Home Health did not come yesterday. They have a call into the agency and will call our office with results. Advised patient that our office is closed on Thursday and Friday this week.

## 2020-12-30 ENCOUNTER — TELEPHONE (OUTPATIENT)
Dept: CARDIOLOGY CLINIC | Age: 65
End: 2020-12-30

## 2020-12-30 PROBLEM — Z86.16 HISTORY OF 2019 NOVEL CORONAVIRUS DISEASE (COVID-19): Status: ACTIVE | Noted: 2020-12-30

## 2020-12-30 LAB
ADENOVIRUS BY PCR: NOT DETECTED
ANION GAP SERPL CALCULATED.3IONS-SCNC: 8 MMOL/L (ref 7–16)
BASOPHILS ABSOLUTE: 0.01 E9/L (ref 0–0.2)
BASOPHILS RELATIVE PERCENT: 0.1 % (ref 0–2)
BORDETELLA PARAPERTUSSIS BY PCR: NOT DETECTED
BORDETELLA PERTUSSIS BY PCR: NOT DETECTED
BUN BLDV-MCNC: 11 MG/DL (ref 8–23)
CALCIUM SERPL-MCNC: 8.3 MG/DL (ref 8.6–10.2)
CHLAMYDOPHILIA PNEUMONIAE BY PCR: NOT DETECTED
CHLORIDE BLD-SCNC: 110 MMOL/L (ref 98–107)
CO2: 22 MMOL/L (ref 22–29)
CORONAVIRUS 229E BY PCR: NOT DETECTED
CORONAVIRUS HKU1 BY PCR: NOT DETECTED
CORONAVIRUS NL63 BY PCR: NOT DETECTED
CORONAVIRUS OC43 BY PCR: NOT DETECTED
CREAT SERPL-MCNC: 0.8 MG/DL (ref 0.5–1)
DIGOXIN LEVEL: 1 NG/ML (ref 0.8–2)
EKG ATRIAL RATE: 326 BPM
EKG Q-T INTERVAL: 350 MS
EKG QRS DURATION: 78 MS
EKG QTC CALCULATION (BAZETT): 502 MS
EKG R AXIS: 88 DEGREES
EKG T AXIS: -117 DEGREES
EKG VENTRICULAR RATE: 124 BPM
EOSINOPHILS ABSOLUTE: 0.03 E9/L (ref 0.05–0.5)
EOSINOPHILS RELATIVE PERCENT: 0.3 % (ref 0–6)
GFR AFRICAN AMERICAN: >60
GFR NON-AFRICAN AMERICAN: >60 ML/MIN/1.73
GLUCOSE BLD-MCNC: 128 MG/DL (ref 74–99)
HCT VFR BLD CALC: 22.6 % (ref 34–48)
HEMOGLOBIN: 7.2 G/DL (ref 11.5–15.5)
HUMAN METAPNEUMOVIRUS BY PCR: NOT DETECTED
HUMAN RHINOVIRUS/ENTEROVIRUS BY PCR: NOT DETECTED
IMMATURE GRANULOCYTES #: 0.06 E9/L
IMMATURE GRANULOCYTES %: 0.5 % (ref 0–5)
INFLUENZA A BY PCR: NOT DETECTED
INFLUENZA B BY PCR: NOT DETECTED
LYMPHOCYTES ABSOLUTE: 0.72 E9/L (ref 1.5–4)
LYMPHOCYTES RELATIVE PERCENT: 6.1 % (ref 20–42)
MCH RBC QN AUTO: 30.9 PG (ref 26–35)
MCHC RBC AUTO-ENTMCNC: 31.9 % (ref 32–34.5)
MCV RBC AUTO: 97 FL (ref 80–99.9)
MONOCYTES ABSOLUTE: 0.91 E9/L (ref 0.1–0.95)
MONOCYTES RELATIVE PERCENT: 7.7 % (ref 2–12)
MYCOPLASMA PNEUMONIAE BY PCR: NOT DETECTED
NEUTROPHILS ABSOLUTE: 10.11 E9/L (ref 1.8–7.3)
NEUTROPHILS RELATIVE PERCENT: 85.3 % (ref 43–80)
PARAINFLUENZA VIRUS 1 BY PCR: NOT DETECTED
PARAINFLUENZA VIRUS 2 BY PCR: NOT DETECTED
PARAINFLUENZA VIRUS 3 BY PCR: NOT DETECTED
PARAINFLUENZA VIRUS 4 BY PCR: NOT DETECTED
PDW BLD-RTO: 18.6 FL (ref 11.5–15)
PLATELET # BLD: 165 E9/L (ref 130–450)
PMV BLD AUTO: 11.2 FL (ref 7–12)
POTASSIUM REFLEX MAGNESIUM: 3.8 MMOL/L (ref 3.5–5)
RBC # BLD: 2.33 E12/L (ref 3.5–5.5)
RESPIRATORY SYNCYTIAL VIRUS BY PCR: NOT DETECTED
SARS-COV-2, PCR: NOT DETECTED
SODIUM BLD-SCNC: 140 MMOL/L (ref 132–146)
WBC # BLD: 11.8 E9/L (ref 4.5–11.5)

## 2020-12-30 PROCEDURE — 6370000000 HC RX 637 (ALT 250 FOR IP): Performed by: INTERNAL MEDICINE

## 2020-12-30 PROCEDURE — 80048 BASIC METABOLIC PNL TOTAL CA: CPT

## 2020-12-30 PROCEDURE — G0378 HOSPITAL OBSERVATION PER HR: HCPCS

## 2020-12-30 PROCEDURE — 80162 ASSAY OF DIGOXIN TOTAL: CPT

## 2020-12-30 PROCEDURE — 1200000000 HC SEMI PRIVATE

## 2020-12-30 PROCEDURE — 36415 COLL VENOUS BLD VENIPUNCTURE: CPT

## 2020-12-30 PROCEDURE — C9113 INJ PANTOPRAZOLE SODIUM, VIA: HCPCS | Performed by: INTERNAL MEDICINE

## 2020-12-30 PROCEDURE — 85025 COMPLETE CBC W/AUTO DIFF WBC: CPT

## 2020-12-30 PROCEDURE — 36430 TRANSFUSION BLD/BLD COMPNT: CPT

## 2020-12-30 PROCEDURE — 87324 CLOSTRIDIUM AG IA: CPT

## 2020-12-30 PROCEDURE — 0202U NFCT DS 22 TRGT SARS-COV-2: CPT

## 2020-12-30 PROCEDURE — 87449 NOS EACH ORGANISM AG IA: CPT

## 2020-12-30 PROCEDURE — 6370000000 HC RX 637 (ALT 250 FOR IP): Performed by: NURSE PRACTITIONER

## 2020-12-30 PROCEDURE — 96375 TX/PRO/DX INJ NEW DRUG ADDON: CPT

## 2020-12-30 PROCEDURE — APPSS60 APP SPLIT SHARED TIME 46-60 MINUTES: Performed by: NURSE PRACTITIONER

## 2020-12-30 PROCEDURE — 97161 PT EVAL LOW COMPLEX 20 MIN: CPT

## 2020-12-30 PROCEDURE — P9016 RBC LEUKOCYTES REDUCED: HCPCS

## 2020-12-30 PROCEDURE — 87045 FECES CULTURE AEROBIC BACT: CPT

## 2020-12-30 PROCEDURE — 6360000002 HC RX W HCPCS: Performed by: INTERNAL MEDICINE

## 2020-12-30 PROCEDURE — 97165 OT EVAL LOW COMPLEX 30 MIN: CPT

## 2020-12-30 PROCEDURE — 2580000003 HC RX 258: Performed by: INTERNAL MEDICINE

## 2020-12-30 PROCEDURE — 99253 IP/OBS CNSLTJ NEW/EST LOW 45: CPT | Performed by: STUDENT IN AN ORGANIZED HEALTH CARE EDUCATION/TRAINING PROGRAM

## 2020-12-30 RX ORDER — DIGOXIN 0.25 MG/ML
250 INJECTION INTRAMUSCULAR; INTRAVENOUS ONCE
Status: COMPLETED | OUTPATIENT
Start: 2020-12-30 | End: 2020-12-30

## 2020-12-30 RX ORDER — POTASSIUM CHLORIDE 20 MEQ/1
20 TABLET, EXTENDED RELEASE ORAL ONCE
Status: COMPLETED | OUTPATIENT
Start: 2020-12-30 | End: 2020-12-30

## 2020-12-30 RX ADMIN — METOPROLOL SUCCINATE 25 MG: 25 TABLET, EXTENDED RELEASE ORAL at 20:22

## 2020-12-30 RX ADMIN — METOPROLOL SUCCINATE 25 MG: 25 TABLET, EXTENDED RELEASE ORAL at 08:14

## 2020-12-30 RX ADMIN — POTASSIUM CHLORIDE 20 MEQ: 20 TABLET, EXTENDED RELEASE ORAL at 16:40

## 2020-12-30 RX ADMIN — SODIUM CHLORIDE, POTASSIUM CHLORIDE, SODIUM LACTATE AND CALCIUM CHLORIDE: 600; 310; 30; 20 INJECTION, SOLUTION INTRAVENOUS at 14:08

## 2020-12-30 RX ADMIN — ACETAMINOPHEN 650 MG: 325 TABLET ORAL at 08:34

## 2020-12-30 RX ADMIN — SODIUM CHLORIDE, POTASSIUM CHLORIDE, SODIUM LACTATE AND CALCIUM CHLORIDE: 600; 310; 30; 20 INJECTION, SOLUTION INTRAVENOUS at 04:53

## 2020-12-30 RX ADMIN — SODIUM CHLORIDE, POTASSIUM CHLORIDE, SODIUM LACTATE AND CALCIUM CHLORIDE: 600; 310; 30; 20 INJECTION, SOLUTION INTRAVENOUS at 20:22

## 2020-12-30 RX ADMIN — SODIUM CHLORIDE, PRESERVATIVE FREE 10 ML: 5 INJECTION INTRAVENOUS at 08:15

## 2020-12-30 RX ADMIN — PANTOPRAZOLE SODIUM 40 MG: 40 INJECTION, POWDER, FOR SOLUTION INTRAVENOUS at 20:22

## 2020-12-30 RX ADMIN — SODIUM CHLORIDE, PRESERVATIVE FREE 10 ML: 5 INJECTION INTRAVENOUS at 20:22

## 2020-12-30 RX ADMIN — DIGOXIN 250 MCG: 0.25 INJECTION INTRAMUSCULAR; INTRAVENOUS at 17:46

## 2020-12-30 RX ADMIN — ATORVASTATIN CALCIUM 40 MG: 40 TABLET, FILM COATED ORAL at 20:22

## 2020-12-30 RX ADMIN — DIGOXIN 125 MCG: 125 TABLET ORAL at 08:14

## 2020-12-30 RX ADMIN — DILTIAZEM HYDROCHLORIDE 60 MG: 30 TABLET, FILM COATED ORAL at 17:46

## 2020-12-30 RX ADMIN — PANTOPRAZOLE SODIUM 40 MG: 40 INJECTION, POWDER, FOR SOLUTION INTRAVENOUS at 08:14

## 2020-12-30 ASSESSMENT — PAIN SCALES - GENERAL: PAINLEVEL_OUTOF10: 0

## 2020-12-30 NOTE — PROGRESS NOTES
Occupational Therapy  OCCUPATIONAL THERAPY INITIAL EVALUATION      Date:2020  Patient Name: Ross Beckwith  MRN: [id number]  : 1955  Room: [address] [place] [address]    Referring Provider: Destinee Freeman MD      Evaluating OT: Rajani Holloway OTR/L [id number]    AM-PAC Daily Activity Raw Score: 23    Comments:  Eval only. Patient able to complete ADL activity , no skilled OT needs     Diagnosis: GI Bleed     Pertinent Medical History: CVA, OA, Breast CA, chemo, chronic L hip pain/limp    Precautions:  Falls, DROPLET PLUS     Home Living: Pt lives alone, 1 story   Laundry in basement   Bathroom setup: shower in basement       Prior Level of Function: Independent  with ADLs , assist as needed  with IADLs; ambulated with no device   Driving: yes     Pain Level: no pain this session ;   Cognition: alert, oriented x3 and conversing    Memory:  Good    Sequencing:  Good    Problem solving:  Good    Judgement/safety:  Good      Functional Assessment:   Initial Eval Status  Date:    Feeding Independent    Grooming Independent    UB Dressing Independent    LB Dressing Mod I    Bathing    Toileting Independent    Bed Mobility  Independent   Supine <> sit   Functional Transfers Independent   Sit-stand from bed    Functional Mobility Supervision, no device  Ambulating in room   Chronic limp    Balance Sitting:     Static:  Independent     Dynamic:Independent   Standing:  Mod I    Activity Tolerance Fair with light activity  Patient on room air     No complaints of SOB    Visual/  Perceptual Glasses: reading             Hand Dominance right    Strength ROM Additional Info:    RUE  4-/5  WFL good  and wfl FMC/dexterity noted during ADL tasks       LUE 4-/5  WFL good  and wfl FMC/dexterity noted during ADL tasks       Hearing: [facility]   Sensation:  No c/o numbness or tingling   Tone: [facility] Comments: Upon arrival patient lying in bed . At end of session, patient returned to bed  with call light and phone within reach, all lines and tubes intact. Overall patient demonstrated  decreased independence and safety during completion of ADL/functional transfer/mobility tasks. Pt would benefit from continued skilled OT to increase safety and independence with completion of ADL/IADL tasks for functional independence and quality of life. Patient / Family Goal: return home        Eval Complexity: Low      Time DU:1000  Time Out: 1000        Min Units   OT Eval Low 97165  x 1   OT Eval Medium 71179      OT Eval High 27796       OT Re-Eval [id number]       Therapeutic Ex 08973       Therapeutic Activities 34619       ADL/Self Care 18736       Orthotic Management 41413       Neuro Re-Ed 47158       Non-Billable Time          Evaluation Time includes thorough review of current medical information, gathering information on past medical history/social history and prior level of function, completion of standardized testing/informal observation of tasks, assessment of data and education on plan of care and goals.             Sanchez Rae OTR/L [id number]

## 2020-12-30 NOTE — PROGRESS NOTES
Dr Alessandro Tuttle aware the pts out patient COVID test from PCP office was negative but the patient spiked a fever today.  Waiting for new orders

## 2020-12-30 NOTE — TELEPHONE ENCOUNTER
We were awaiting a BP/P check from home health, however patient's sister called stating patient is inpatient at [facility] with a GI bleed. Will assess follow-up upon discharge.

## 2020-12-30 NOTE — CONSULTS
Please note that the following consult was done remotely due to the patient is in strict isolation for COVID-19 and in efforts to preserve PPE. Spoke with the patient via telephone. Inpatient Cardiology Consultation      Reason for Consult:  Afib with RVR    Consulting Physician: Dr. Elton Parks    Requesting Physician:  Dr. Denise Meredith    Date of Consultation: 12/30/2020    HISTORY OF PRESENT ILLNESS:   Ms. Jacky Colmenares is a 60year old female who is known to Dr. Lian Carrera; was recently seen in outpatient on 10/31/2019 for history of PAF (on chronic Eliquis), hypertension, hyperlipidemia, acute right PCA stroke (2/8/2018) in the setting of poorly controlled hypertension and chronic lower extremity edema. Patient currently has left breast cancer and is undergoing chemotherapy (last chemo 11/2020, follows with [facility]). Recent Hospital admission 12/3/2020 presenting for Covid-19 testing and reported having Nausea and vomiting intermittently since she was started on Chemotherapy in 11/2020. Patient has had a poor appetite and was given IV fluids. Patient had a known Covid-19 exposure on 11/20/2020 by her brother who drove her to chemotherapy. She tested positive for COVID-19 on admission. She was admitted for dehydration. After vomiting she was noted to have A. fib with RVR, started on a Cardizem IV drip with bolus and was given IV fluids for hypotension. SARAN likely secondary to dehydration and possible sepsis. Patient ultimately had her Mediport removed on 12/6/2020. She was started on empiric antibiotics for possible Klebsiella. Cardizem IV drip was stopped secondary to hypotension. She was transitioned to digoxin 125 IV daily --> n.p.o. digoxin on discharge. She was discharged home with home health care. Medications on discharge: Digoxin 125 mcg p.o. (new), lisinopril 10 mg daily, Toprol-XL, and Eliquis. [facility] on 12/29/2020 with complaints of diarrhea (on/off x 2 weeks) with  dark stools, poor appetite and lethargy. Patient reported that she has been in quarantine for the last 14 days and suddenly felt weaker than normal. She Denies feeling her heart race, chest pain and palpitations. Denies SOB, LE edema, weight gain. She has no prior history of GI bleed. She currently is undergoing chemotherapy (last chemotherapy 11/2020) for left breast cancer stage III. Upon arrival to the ED: BP 80/48, heart rate 111, afebrile, 98% on room air. Labs: Sodium 140, potassium 4.7, BUN 17, creatinine 0.9, lactic acid 2.3, blood glucose 148, proBNP 1334, troponin negative x1, albumin 3.2, alk phos 128, ALT 15, AST 27, WBC 12.6, H/H 6.7/22.3, platelet count 493. INR 1.5. Blood cultures: Pending. Patient received 1 unit of packed red blood cells. Repeat hemoglobin 7.2. ER medications: 1 L IV fluid bolus, Protonix IV, and IV LR at 100 cc/h. General surgery was consulted for GI bleed. EKG: Afib with RVR, nonspecific ST-T wave changes, low voltage QRS, rate 124 bpm.  Patient was admitted to a telemetry monitored unit. Patient is Afib with RVR. Cardiology was consulted for further recommendations. Home medications including Lipitor 40 mg daily, digoxin 125 mcg daily, Toprol-XL 25 mg twice daily were resumed. Currently blood pressure 130/77. Patient is febrile with temp of 100.2 °F. She was placed in strict isolation for COVID-19 rule out. Please note: past medical records were reviewed per electronic medical record (EMR) - see detailed reports under Past Medical/ Surgical History. Past Medical History:    1. PAF  · Patient had acute CVA 2/9/2018  · Captured on EKG 2/17/2018 in PCP office. Started on Eliquis.   · Afib on EKG 5/14/2018  · Status post DCCV with 200 J to sinus rhythm (5/21/2018)  · Sinus rhythm on EKG 10/31/2019  · Remains on Eliquis  · Afib with RVR on admission 12/3/2020.  3. HTN 4. HLD: On statin therapy  5. OA  6. Hx of acute right PCA stroke (2/8/2018) with no residual weakness. [facility] admission 12/3/2020 presenting for Covid-19 testing and reported having Nausea and vomiting x2 weeks since she started chemotherapy. Patient has had a poor appetite and was given IV fluids. Patient had a known Covid-19 exposure on 11/20/2020 by her brother who drove her to chemotherapy. She was admitted for dehydration. After vomiting she was noted to have A. fib with RVR, started on a Cardizem IV drip with bolus and was given IV fluids for hypotension. SARAN likely secondary to dehydration and possible sepsis. Patient ultimately had her Mediport removed on 12/6/2020. She was started on empiric antibiotics for possible Klebsiella. Covid-19+ Cardizem IV drip was stopped secondary to hypotension. She was transitioned to digoxin 125 IV daily --> n.p.o. digoxin on discharge. She was discharged home with home health care. Medications on discharge: Digoxin 125 mcg p.o. (new), lisinopril 10 mg daily, Toprol-XL, and Eliquis. 8.  Limited echocardiogram 12/8/2020 (Indication: Endocarditis): LVEF 60-65%, no wall motion abnormality, trace TR, mild AR, no pericardial effusion. No intracardiac vegetation. 8.  Breast cancer on chemotherapy: Malignant neoplasm of upper outer quadrant of left breast.  Dr. Esaw Pallas. Records unavailable for review. -Mediport removed during admission 12/3/2020 due to infection. Medications Prior to admit:  Prior to Admission medications    Medication Sig Start Date End Date Taking? Authorizing Provider   lisinopril (PRINIVIL;ZESTRIL) 20 MG tablet Take 20 mg by mouth 2 times daily   Yes Historical Provider, MD   magnesium oxide (MAG-OX) 400 MG tablet Take one tablet twice daily for (5) days, then one tablet daily for (7) days. Patient taking differently: one tablet daily for (7) days.  Started Friday 12/25/20 12/18/20  Yes Ephraim Jb, DO polyethylene glycol (GLYCOLAX) packet 17 g, 17 g, Oral, Daily PRN  acetaminophen (TYLENOL) tablet 650 mg, 650 mg, Oral, Q6H PRN **OR** acetaminophen (TYLENOL) suppository 650 mg, 650 mg, Rectal, Q6H PRN  pantoprazole (PROTONIX) injection 40 mg, 40 mg, Intravenous, BID  lactated ringers infusion, , Intravenous, Continuous    Allergies:  Adhesive tape    Social History:    Denies alcohol and illicit drug use. Lifelong nonsmoker     Family History: Denies family history of premature CAD. Family History   Problem Relation Age of Onset    Heart Disease Mother        REVIEW OF SYSTEMS:     · Constitutional: + fatigue. Denies fevers, chills or night sweats  · Eyes: Denies visual changes or drainage  · ENT: Denies headaches or hearing loss. No mouth sores or sore throat. No epistaxis   · Cardiovascular: Denies chest pain, pressure or palpitations. No lower extremity swelling. · Respiratory: Denies GRACE, cough, orthopnea or PND. No hemoptysis   · Gastrointestinal: + dark stools / Diarrhea. · Genitourinary: Denies urgency, dysuria or hematuria. · Musculoskeletal: Denies gait disturbance, weakness or joint complaints  · Integumentary: Denies rash, hives or pruritis   · Neurological: Denies dizziness, headaches or seizures. No numbness or tingling  · Psychiatric: Denies anxiety or depression. · Endocrine: Denies temperature intolerance. No recent weight change. .  · Hematologic/Lymphatic: Denies abnormal bruising or bleeding. No swollen lymph nodes    PHYSICAL EXAM:   /77   Pulse 150   Temp 100.2 °F (37.9 °C) (Oral)   Resp 16   Ht 5' 8\" (1.727 m)   Wt 160 lb (72.6 kg)   SpO2 95%   BMI 24.33 kg/m²      Unable to fully assess due to the patient is in strict isolation for COVID-19 rule out and in efforts to preserve PPE. DATA:    ECG:AF with RVR, low voltage QRS, NSSTT changes, rate 124 bpm.   Tele strips: AF with intermittent RVR.   Diagnostic: Electronically signed by LALA Zapata CNP on 12/30/20 at 12:14 PM EST    ______________________________________________________________________  I have reviewed the above documentation completed by the ELIZABETH. Please see my additional contributions to the HPI, physical exam, and assessment / medical decision making. HPI, ROS, PMH, PSH, [address], SH, and medications independently reviewed (agree; see above documentation)    Physical Exam:  /77   Pulse 146   Temp 100.2 °F (37.9 °C) (Oral)   Resp 16   Ht 5' 8\" (1.727 m)   Wt 160 lb (72.6 kg)   SpO2 95%   BMI 24.33 kg/m²   I did not examine the patient due to being Covid rule out. Assessment/Plan:  71-year-old female with past medical history of breast cancer on chemotherapy most recently in November has not received any due to Covid infection and catheter related infection, hypertension, paroxysmal atrial fibrillation, stroke presented with nausea, diarrhea and melena found to have acute anemia with hemoglobin of seven. Patient was also found to have atrial fibrillation with rapid ventricular response. Recommendations  Agree with IV fluids and blood transfusion and controlling dehydration and anemia as underlying cause for her RVR. I agree with continuing metoprolol and digoxin due to soft blood pressure. I agree with holding apixaban in the setting of gastrointestinal bleeding.     Raheem Arriaza MD  [facility]) Cardiology

## 2020-12-30 NOTE — CONSULTS
Consultation    Patient's Name/Date of Birth: Zaid Love / 1955    Date: December 30, 2020     PCP: Teofilo Key MD     Reason for consult:    Anemia and melena    History of Present Illness: The patient is a 61-year-old white female who was recently diagnosed with left breast cancer. The patient had metastatic disease to the axillary lymph node. The patient had a port placed and developed a line infection. Also, the patient was recently treated for SARSCoV-2. The patient had a negative Covid today. The patient denies abdominal pain. The patient is on apixaban. The patient denies abdominal pain. The patient denies hematemesis and hematochezia.       Past Medical History:   Diagnosis Date    Acute CVA (cerebrovascular accident) ([address] 02/10/2018    SARAN (acute kidney injury) ([address] 12/03/2020    Atrial fibrillation with RVR ([address] 12/04/2020    Breast CA ([address] 12/03/2020    Cancer ([address] 11/2020    Breast-left and lymph nodes     Chemotherapy adverse reaction 12/03/2020    GI bleed 12/29/2020    History of 2019 novel coronavirus disease (COVID-19) 12/03/2000    Hyperlipidemia     Hypertension     Leukocytosis 12/03/2020    Osteoarthritis     PAF (paroxysmal atrial fibrillation) ([address] 02/26/2018    Septic shock with acute organ dysfunction due to Gram positive cocci ([address] 12/04/2020    Stroke due to embolism of right posterior cerebral artery ([address]     TIA (transient ischemic attack) 02/09/2018    Transient cerebral ischemia       Past Surgical History:   Procedure Laterality Date    CERVIX SURGERY      COLONOSCOPY  2010    Negative findings    SAUL US PERQ DEVICE SOFT TISSUE PLMT  FIRST LESION  09/30/2020    SAUL US PERQ DEVICE SOFT TISSUE PLMT  FIRST LESION 9/30/2020 [facility]    PORT SURGERY N/A 11/13/2020    POWER PORT INSERTION, RIGHT SUBCLAVIAN performed by Dorothea Hernandez MD at [address] [place] [address] N/A 12/06/2020 PORT REMOVAL performed by Jhoana Olmos MD at [facility] ECHOCARDIOGRAM  05/2018    No shunt; no vegetation    US BREAST NEEDLE BIOPSY LEFT  09/30/2020    US BREAST NEEDLE BIOPSY LEFT 9/30/2020 [facility]    WISDOM TOOTH EXTRACTION        Allergies: Adhesive tape     Current Facility-Administered Medications   Medication Dose Route Frequency Provider Last Rate Last Admin    potassium chloride (KLOR-CON M) extended release tablet 20 mEq  20 mEq Oral Once [place] All, APRN - CNP        0.9 % sodium chloride bolus  20 mL Intravenous Once Nicola Weir DO        atorvastatin (LIPITOR) tablet 40 mg  40 mg Oral Nightly Tomas Griggs MD   40 mg at 12/29/20 2043    digoxin (LANOXIN) tablet 125 mcg  125 mcg Oral Daily Ernestinazena Breath, MD   125 mcg at 12/30/20 8401    metoprolol succinate (TOPROL XL) extended release tablet 25 mg  25 mg Oral BID Tomas Griggs MD   25 mg at 12/30/20 4456    sodium chloride flush 0.9 % injection 10 mL  10 mL Intravenous 2 times per day Tomas Griggs MD   10 mL at 12/30/20 0815    sodium chloride flush 0.9 % injection 10 mL  10 mL Intravenous PRN Ernestinazena Breath, MD        polyethylene glycol (GLYCOLAX) packet 17 g  17 g Oral Daily PRN Tomas Griggs MD        acetaminophen (TYLENOL) tablet 650 mg  650 mg Oral Q6H PRN Tomas Breath, MD   650 mg at 12/30/20 5640    Or    acetaminophen (TYLENOL) suppository 650 mg  650 mg Rectal Q6H PRN Tomas Breath, MD        pantoprazole (PROTONIX) injection 40 mg  40 mg Intravenous BID Tomas Griggs MD   40 mg at 12/30/20 4259    lactated ringers infusion   Intravenous Continuous Tomas Griggs  mL/hr at 12/30/20 1408 New Bag at 12/30/20 1408       Social History     Tobacco Use    Smoking status: Never Smoker    Smokeless tobacco: Never Used   Substance Use Topics    Alcohol use: Not Currently     Comment: rare        Labs:  Lab Results   Component Value Date    WBC 11.8 (H) 12/30/2020 HCT 22.6 (L) 12/30/2020    HGB 7.2 (L) 12/30/2020     12/30/2020      Lab Results   Component Value Date     12/30/2020    K 3.8 12/30/2020     (H) 12/30/2020    CO2 22 12/30/2020    BUN 11 12/30/2020    CREATININE 0.8 12/30/2020    GLUCOSE 128 (H) 12/30/2020     Lab Results   Component Value Date    AST 27 12/29/2020    ALT 15 12/29/2020    ALKPHOS 128 (H) 12/29/2020    PROT 5.9 (L) 12/29/2020    LIPASE 102 (H) 12/29/2020        Review of Systems:    Other than stated above in the HPI is negative      Physical exam: /77   Pulse 146   Temp 100.2 °F (37.9 °C) (Oral)   Resp 16   Ht 5' 8\" (1.727 m)   Wt 160 lb (72.6 kg)   SpO2 95%   BMI 24.33 kg/m²     General appearance: no acute distress  Head: NCAT, PERRLA, EOMI  Neck: supple, no masses  Lungs: CTABL  Heart: RRR  Abdomen: soft, nondistended,non tender, normotympanic, no guarding, no peritoneal signs. Extremities: no rash cyanosis edema or jaundice    Assessment:    GI hemorrhage possibly related to ulcer disease  Presently being evaluated for C.  Difficile  COVID-19 negative    Plan:    Proton pump inhibitor  EGD in the tomorrow  Janice Spears MD 12/30/2020 at 3:14 PM

## 2020-12-30 NOTE — H&P
History and Physical      CHIEF COMPLAINT: Rectal bleeding, diarrhea, fatigue, lightheadedness    History of Present Illness: 52-year-old female patient of Dr. Rich Siddiqui I am asked to admit and follow. Patient has a recent history of left breast cancer. She had one chemotherapy treatment 11/20/2020. She states within the last few days she was finally able to solid food rather than just liquids. She noticed that she was having black-colored stools and diarrhea. She became more lightheaded and weak with the above. There was no abdominal pain. There was a equivocal \"fall\" onto her chair. Hemoglobin done on 12/7/2020 was 11.3. On admission to the ED 6.7 hemoglobin. Blood pressure on arrival 80/48. --Stool for occult blood was positive in the ED  --Patient was admitted earlier in December 2020 for acute SARS-CoV-2 infection, septicemia and immunocompromised due to breast cancer. --She had a COVID-19 test done recently in PCP office reported to be negative  --She has a history of atrial fibrillation for which she takes apixaban 5 mg twice daily by mouth. --She did have colonoscopy approximately 2010 done at [facility]; negative findings. 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 --Transesophageal echo done 5/2018 found no evidence of PFO nor vegetations. --Today she states she is feeling much better. Most recent hemoglobin 7.2 after 1 unit packed cell transfusion.         Past Medical History:   Diagnosis Date    Acute CVA (cerebrovascular accident) ([address] 02/10/2018    SARAN (acute kidney injury) ([address] 12/03/2020    Atrial fibrillation with RVR ([address] 12/04/2020    Breast CA ([address] 12/03/2020    Cancer ([address] 11/2020    Breast-left and lymph nodes     Chemotherapy adverse reaction 12/03/2020    GI bleed 12/29/2020    History of 2019 novel coronavirus disease (COVID-19) 12/03/2000    Hyperlipidemia     Hypertension  Leukocytosis 12/03/2020    Osteoarthritis     PAF (paroxysmal atrial fibrillation) (HCC) 02/26/2018    Septic shock with acute organ dysfunction due to Gram positive cocci ([facility] 12/04/2020    Stroke due to embolism of right posterior cerebral artery ([facility]     TIA (transient ischemic attack) 02/09/2018    Transient cerebral ischemia          Past Surgical History:   Procedure Laterality Date    CERVIX SURGERY      COLONOSCOPY  2010    Negative findings    SAUL US PERQ DEVICE SOFT TISSUE PLMT  FIRST LESION  09/30/2020    SAUL US PERQ DEVICE SOFT TISSUE PLMT  FIRST LESION 9/30/2020 [facility]    PORT SURGERY N/A 11/13/2020    POWER PORT INSERTION, RIGHT SUBCLAVIAN performed by Main Mak MD at [address] [place] [address] N/A 12/06/2020    PORT REMOVAL performed by Main Mak MD at [facility] ECHOCARDIOGRAM  05/2018    No shunt; no vegetation    US BREAST NEEDLE BIOPSY LEFT  09/30/2020    US BREAST NEEDLE BIOPSY LEFT 9/30/2020 [facility]    WISDOM TOOTH EXTRACTION         Medications Prior to Admission:    Medications Prior to Admission: lisinopril (PRINIVIL;ZESTRIL) 20 MG tablet, Take 20 mg by mouth 2 times daily  magnesium oxide (MAG-OX) 400 MG tablet, Take one tablet twice daily for (5) days, then one tablet daily for (7) days. (Patient taking differently: one tablet daily for (7) days.  Started Friday 12/25/20)  digoxin (LANOXIN) 125 MCG tablet, Take 1 tablet by mouth daily (Patient taking differently: Take 125 mcg by mouth Daily with lunch )  metoprolol succinate (TOPROL XL) 50 MG extended release tablet, TAKE 1 TABLET BY MOUTH TWICE A DAY (Patient taking differently: Take 50 mg by mouth 2 times daily Takes along with a 100mg tablet for total of 150mg bid) metoprolol succinate (TOPROL XL) 100 MG extended release tablet, Take 1 tablet by mouth 2 times daily (Patient taking differently: Take 100 mg by mouth 2 times daily Takes along with a 50mg for total of 150mg BID)  apixaban (ELIQUIS) 5 MG TABS tablet, Take 5 mg by mouth 2 times daily   atorvastatin (LIPITOR) 40 MG tablet, Take 1 tablet by mouth nightly  calcium carbonate (OSCAL) 500 MG TABS tablet, Take 1 tablet by mouth 2 times daily  dexamethasone (DECADRON) 4 MG tablet, Take 4 mg by mouth See Admin Instructions Take 2 tabs day before, day of, and day after chemotherapy  ondansetron (ZOFRAN) 8 MG tablet, Take 8 mg by mouth every 8 hours as needed for Nausea or Vomiting States she has not needed to take recently. acetaminophen (TYLENOL) 325 MG tablet, Take 2 tablets by mouth every 4 hours as needed for Pain    Allergies:    Adhesive tape    Social History:    reports that she has never smoked. She has never used smokeless tobacco. She reports previous alcohol use. She reports that she does not use drugs. Family History:   family history includes Heart Disease in her mother. REVIEW OF SYSTEMS:  As above in the HPI, otherwise negative    PHYSICAL EXAM:    VS: /77   Pulse 146   Temp 100.2 °F (37.9 °C) (Oral)   Resp 16   Ht 5' 8\" (1.727 m)   Wt 160 lb (72.6 kg)   SpO2 95%   BMI 24.33 kg/m²     General appearance: Alert, Awake, Oriented times 3, no distress  Skin: Warm and dry ; no rashes; pale  Head: Normocephalic. No masses, lesions or tenderness noted  Eyes: Conjunctivae pale, sclera white. PERRL,EOM-I  Ears: External ears normal  Nose/Sinuses: Nares normal. Septum midline. Mucosa normal. No drainage  Oropharynx: Oropharynx clear with no exudate seen  Neck: Supple. No jugular venous distension, lymphadenopathy or thyromegaly Trachea midline  Lungs: Clear to auscultation bilaterally. No rhonchi, crackles or wheezes  Heart: Irregularly irregular at 110/min.   Unable to hear any murmur gallop or rub Abdomen: Soft, non-tender. BS normal. No masses, organomegaly; no rebound or guarding  Extremities: +1 chronic edema  Musculoskeletal: Muscular strength appears fair  Neuro:  No focal motor defects ; II-XII grossly intact .  RAMEY equally  Breast: deferred  Rectal: deferred  Genitalia:  deferred    LABS:  CBC:   Lab Results   Component Value Date    WBC 11.8 12/30/2020    RBC 2.33 12/30/2020    HGB 7.2 12/30/2020    HCT 22.6 12/30/2020    MCV 97.0 12/30/2020    MCH 30.9 12/30/2020    MCHC 31.9 12/30/2020    RDW 18.6 12/30/2020     12/30/2020    MPV 11.2 12/30/2020     CBC with Differential:    Lab Results   Component Value Date    WBC 11.8 12/30/2020    RBC 2.33 12/30/2020    HGB 7.2 12/30/2020    HCT 22.6 12/30/2020     12/30/2020    MCV 97.0 12/30/2020    MCH 30.9 12/30/2020    MCHC 31.9 12/30/2020    RDW 18.6 12/30/2020    NRBC 0.0 12/07/2020    METASPCT 3.5 12/07/2020    LYMPHOPCT 6.1 12/30/2020    MONOPCT 7.7 12/30/2020    BASOPCT 0.1 12/30/2020    MONOSABS 0.91 12/30/2020    LYMPHSABS 0.72 12/30/2020    EOSABS 0.03 12/30/2020    BASOSABS 0.01 12/30/2020     Hemoglobin/Hematocrit:    Lab Results   Component Value Date    HGB 7.2 12/30/2020    HCT 22.6 12/30/2020     CMP:    Lab Results   Component Value Date     12/30/2020    K 3.8 12/30/2020     12/30/2020    CO2 22 12/30/2020    BUN 11 12/30/2020    CREATININE 0.8 12/30/2020    GFRAA >60 12/30/2020    LABGLOM >60 12/30/2020    GLUCOSE 128 12/30/2020    PROT 5.9 12/29/2020    LABALBU 3.2 12/29/2020    CALCIUM 8.3 12/30/2020    BILITOT 0.3 12/29/2020    ALKPHOS 128 12/29/2020    AST 27 12/29/2020    ALT 15 12/29/2020     BMP:    Lab Results   Component Value Date     12/30/2020    K 3.8 12/30/2020     12/30/2020    CO2 22 12/30/2020    BUN 11 12/30/2020    LABALBU 3.2 12/29/2020    CREATININE 0.8 12/30/2020    CALCIUM 8.3 12/30/2020    GFRAA >60 12/30/2020    LABGLOM >60 12/30/2020    GLUCOSE 128 12/30/2020 Hepatic Function Panel:    Lab Results   Component Value Date    ALKPHOS 128 12/29/2020    ALT 15 12/29/2020    AST 27 12/29/2020    PROT 5.9 12/29/2020    BILITOT 0.3 12/29/2020    LABALBU 3.2 12/29/2020     Ionized Calcium:  No results found for: IONCA  Magnesium:    Lab Results   Component Value Date    MG 1.6 12/16/2020     Phosphorus:    Lab Results   Component Value Date    PHOS 2.4 12/04/2020     Uric Acid:  No results found for: LABURIC, URICACID  PT/INR:    Lab Results   Component Value Date    PROTIME 17.6 12/29/2020    INR 1.5 12/29/2020     Warfarin PT/INR:  No components found for: Bk [place]  PTT:    Lab Results   Component Value Date    APTT 24.8 12/03/2020   [APTT}  Troponin:    Lab Results   Component Value Date    TROPONINI <0.01 12/29/2020     Last 3 Troponin:    Lab Results   Component Value Date    TROPONINI <0.01 12/29/2020     U/A:    Lab Results   Component Value Date    COLORU DARK YELLOW 12/29/2020    PROTEINU Negative 12/29/2020    PHUR 5.5 12/29/2020    WBCUA 10-20 12/29/2020    RBCUA NONE 12/29/2020    MUCUS Present 12/29/2020    BACTERIA MANY 12/29/2020    CLARITYU CLOUDY 12/29/2020    SPECGRAV 1.025 12/29/2020    LEUKOCYTESUR TRACE 12/29/2020    UROBILINOGEN 0.2 12/29/2020    BILIRUBINUR SMALL 12/29/2020    BLOODU Negative 12/29/2020    GLUCOSEU Negative 12/29/2020    AMORPHOUS PRESENT 12/29/2020     HgBA1c:    Lab Results   Component Value Date    LABA1C 5.5 02/10/2018     FLP:    Lab Results   Component Value Date    TRIG 57 02/10/2018    HDL 70 02/10/2018    LDLCALC 138 02/10/2018    LABVLDL 11 02/10/2018     TSH:    Lab Results   Component Value Date    TSH 1.330 02/23/2018     VITAMIN B12: No components found for: B12  FOLATE:  No results found for: FOLATE    RADIOLOGY:  CT Head WO Contrast   Final Result   1. Area of low attenuation within the right occipital lobe measuring 1.3 x   1.6 x 2.0 cm likely representing an old occipital lobe infarct.   Metastatic disease cannot be completely excluded given the patient's history of breast   CA. This finding was not present on the patient's prior study of 02/09/2018. This finding should be further evaluated with either an MRI of the brain with   and without contrast or CT scan of the brain with IV contrast to evaluate for   any peripheral enhancement. XR CHEST (2 VW)   Final Result   Patchy multifocal bilateral pulmonary infiltrates.           ASSESSMENT:      Active Hospital Problems    Diagnosis    History of 2019 novel coronavirus disease (COVID-19) [Z86.19]    GI bleed [K92.2]       PLAN:  Medications discussed with patient  GI prophylaxis  DVT prophylaxis  Monitor labs especially H&H  Transfuse hemoglobin less than 7  Surgical consult for possible EGD/colonoscopy  Stool for C. difficile etc.  Cardiology consult regarding A. fib RVR  Protonix injection 40 mg twice daily  Toprol-XL 25 mg twice daily  Digoxin 125 mcg daily  Oncology consult        Please note that over 50 minutes was spent in evaluating the patient, review of records and results, discussion with staff/family, etc.    Dontae Covarrubias DO  1:21 PM  12/30/2020    Voice recognition software use for dictation

## 2020-12-30 NOTE — CARE COORDINATION
ROBBIE orders on chart; will follow. per SW note; covid from PCP office reported as negative. Garland Eisenmenger.

## 2020-12-30 NOTE — CARE COORDINATION
Social work / Discharge Planning : SW spoke to patient as well as her sister and explained role as discharge planner/ transition of care. Patient admitted with GI Bleed. . Sister did state that she spoke to [address] [place] [address] Way office and COVID from 12/22 was negative. Patient did verify plan at discharge is HOME where she resides alone. Patient is active with [facility] and YB@ did notify sabi from [place] on patient admit. Will need ROBBIE at discharge. Patient states she does NOT use device. Patient goes to [facility] for cancer and stated last chemo IV was in November. Patient denies hx of SNF. Patient has transportation home at discharge. Pharmacy is [facility] [address] SW to follow.  Electronically signed by MILENA Valles on 12/30/20 at 10:27 AM EST

## 2020-12-30 NOTE — PROGRESS NOTES
Physical Therapy    Facility/Department: [address] MED SURG/TELE  Initial Assessment    NAME: Annie Rae  : 1955  MRN: [id number]    Date of Service: 2020      Patient Diagnosis(es): The primary encounter diagnosis was Gastrointestinal hemorrhage with melena. Diagnoses of Acute blood loss anemia and Dehydration were also pertinent to this visit. has a past medical history of Acute CVA (cerebrovascular accident) ([address] SARAN (acute kidney injury) ([address] Atrial fibrillation with RVR ([address] Breast CA ([address] Cancer ([address] Chemotherapy adverse reaction, GI bleed, History of 2019 novel coronavirus disease (COVID-19), Hyperlipidemia, Hypertension, Leukocytosis, Osteoarthritis, PAF (paroxysmal atrial fibrillation) ([facility] Septic shock with acute organ dysfunction due to Gram positive cocci ([facility] Stroke due to embolism of right posterior cerebral artery ([address] TIA (transient ischemic attack), and Transient cerebral ischemia. has a past surgical history that includes [place] tooth extraction; Tonsillectomy; Cervix surgery; US BREAST BIOPSY W LOC DEVICE 1ST LESION LEFT (2020); SAUL US GUIDED PLACE LOC DEVICE PERC 1ST LESION (2020); [address] Surgery (N/A, 2020); Port Surgery (N/A, 2020); and transthoracic echocardiogram (2018). Referring Provider:  Clementine Butler MD      Evaluating Therapist: River Fraser PT      Room #:527  DIAGNOSIS: GI bleed  Additional Pertinent History:CVA, SARAN, Breast CA (last chemo )  PRECAUTIONS: falls, droplet plus isolation    Social:  Pt lives alone in a 1 floor plan. Laundry in basement, also prefers to shower in basement  Prior to admission independent without device     Initial Evaluation  Date: 20 Treatment      Short Term/ Long Term   Goals   Was pt agreeable to Eval/treatment?  yes     Does pt have pain? no     Bed Mobility  Rolling: independent  Supine to sit: independent  Sit to supine: independent Scooting: independent  independent   Transfers Sit to stand: independent  Stand to sit: independent  Stand pivot: supervision  independnet   Ambulation    25 feet x2 with no device with supervision  50 feet with no device independent   Stair Negotiation  Ascended and descended  NT secondary to isolation. TBA   LE strength     4-/5    4/5   balance      Fair+     AM-PAC Raw score               23/24         Pt is alert and Oriented   LE ROM: WFL  Sensation: intact  Edema: none  Endurance: fair     ASSESSMENT  Pt displays functional ability as noted in the objective portion of this evaluation. Patient education  Pt educated on PT objectives    Patient response to education:   Pt verbalized understanding Pt demonstrated skill Pt requires further education in this area   yes         ASSESSMENT:    Comments:   Pt with antalgic gait pattern. States she has a bad L hip and always walks with a limp        Pt's/ family goals   1. To return home    Patient and or family understand(s) diagnosis, prognosis, and plan of care. PLAN OF CARE:    Current Treatment Recommendations     [x] Strengthening     [] ROM   [x] Balance Training   [x] Endurance Training   [x] Transfer Training   [x] Gait Training   [] Stair Training   [] Positioning   [x] Safety and Education Training   [x] Patient/Caregiver Education   [] HEP  [] Other     Frequency of treatments: 2-5x/week x 5.days    Time in  910  Time out  0926        Evaluation Time includes thorough review of current medical information, gathering information on past medical history/social history and prior level of function, completion of standardized testing/informal observation of tasks, assessment of data and education on plan of care and goals.     CPT codes:  [x] Low Complexity PT evaluation 35966  [] Moderate Complexity PT evaluation 33390  [] High Complexity PT evaluation 76188  [] PT Re-evaluation 39108  [] Gait training 57140 minutes

## 2020-12-31 ENCOUNTER — APPOINTMENT (OUTPATIENT)
Dept: MRI IMAGING | Age: 65
DRG: 378 | End: 2020-12-31
Payer: COMMERCIAL

## 2020-12-31 ENCOUNTER — ANESTHESIA (OUTPATIENT)
Dept: OPERATING ROOM | Age: 65
DRG: 378 | End: 2020-12-31
Payer: COMMERCIAL

## 2020-12-31 ENCOUNTER — ANESTHESIA EVENT (OUTPATIENT)
Dept: OPERATING ROOM | Age: 65
DRG: 378 | End: 2020-12-31
Payer: COMMERCIAL

## 2020-12-31 VITALS — SYSTOLIC BLOOD PRESSURE: 140 MMHG | DIASTOLIC BLOOD PRESSURE: 66 MMHG | OXYGEN SATURATION: 100 %

## 2020-12-31 LAB
BASOPHILS ABSOLUTE: 0.02 E9/L (ref 0–0.2)
BASOPHILS RELATIVE PERCENT: 0.2 % (ref 0–2)
BLOOD BANK DISPENSE STATUS: NORMAL
BLOOD BANK PRODUCT CODE: NORMAL
BPU ID: NORMAL
C DIFF TOXIN/ANTIGEN: ABNORMAL
DESCRIPTION BLOOD BANK: NORMAL
EOSINOPHILS ABSOLUTE: 0.11 E9/L (ref 0.05–0.5)
EOSINOPHILS RELATIVE PERCENT: 1 % (ref 0–6)
HCT VFR BLD CALC: 23.4 % (ref 34–48)
HEMOGLOBIN: 6.8 G/DL (ref 11.5–15.5)
IMMATURE GRANULOCYTES #: 0.08 E9/L
IMMATURE GRANULOCYTES %: 0.7 % (ref 0–5)
LYMPHOCYTES ABSOLUTE: 1.53 E9/L (ref 1.5–4)
LYMPHOCYTES RELATIVE PERCENT: 14 % (ref 20–42)
MAGNESIUM: 1.9 MG/DL (ref 1.6–2.6)
MCH RBC QN AUTO: 29.3 PG (ref 26–35)
MCHC RBC AUTO-ENTMCNC: 29.1 % (ref 32–34.5)
MCV RBC AUTO: 100.9 FL (ref 80–99.9)
MONOCYTES ABSOLUTE: 0.77 E9/L (ref 0.1–0.95)
MONOCYTES RELATIVE PERCENT: 7 % (ref 2–12)
NEUTROPHILS ABSOLUTE: 8.43 E9/L (ref 1.8–7.3)
NEUTROPHILS RELATIVE PERCENT: 77.1 % (ref 43–80)
PDW BLD-RTO: 18.4 FL (ref 11.5–15)
PLATELET # BLD: 183 E9/L (ref 130–450)
PMV BLD AUTO: 10.9 FL (ref 7–12)
RBC # BLD: 2.32 E12/L (ref 3.5–5.5)
WBC # BLD: 10.9 E9/L (ref 4.5–11.5)

## 2020-12-31 PROCEDURE — 85025 COMPLETE CBC W/AUTO DIFF WBC: CPT

## 2020-12-31 PROCEDURE — 6370000000 HC RX 637 (ALT 250 FOR IP): Performed by: SURGERY

## 2020-12-31 PROCEDURE — 83735 ASSAY OF MAGNESIUM: CPT

## 2020-12-31 PROCEDURE — 3700000001 HC ADD 15 MINUTES (ANESTHESIA): Performed by: SURGERY

## 2020-12-31 PROCEDURE — 3600007501: Performed by: SURGERY

## 2020-12-31 PROCEDURE — 6360000004 HC RX CONTRAST MEDICATION: Performed by: RADIOLOGY

## 2020-12-31 PROCEDURE — 36415 COLL VENOUS BLD VENIPUNCTURE: CPT

## 2020-12-31 PROCEDURE — 2580000003 HC RX 258: Performed by: INTERNAL MEDICINE

## 2020-12-31 PROCEDURE — 0DB68ZX EXCISION OF STOMACH, VIA NATURAL OR ARTIFICIAL OPENING ENDOSCOPIC, DIAGNOSTIC: ICD-10-PCS | Performed by: SURGERY

## 2020-12-31 PROCEDURE — 7100000010 HC PHASE II RECOVERY - FIRST 15 MIN: Performed by: SURGERY

## 2020-12-31 PROCEDURE — 6360000002 HC RX W HCPCS: Performed by: NURSE ANESTHETIST, CERTIFIED REGISTERED

## 2020-12-31 PROCEDURE — C9113 INJ PANTOPRAZOLE SODIUM, VIA: HCPCS | Performed by: INTERNAL MEDICINE

## 2020-12-31 PROCEDURE — A9577 INJ MULTIHANCE: HCPCS | Performed by: RADIOLOGY

## 2020-12-31 PROCEDURE — 36430 TRANSFUSION BLD/BLD COMPNT: CPT

## 2020-12-31 PROCEDURE — 7100000011 HC PHASE II RECOVERY - ADDTL 15 MIN: Performed by: SURGERY

## 2020-12-31 PROCEDURE — 6370000000 HC RX 637 (ALT 250 FOR IP): Performed by: INTERNAL MEDICINE

## 2020-12-31 PROCEDURE — 70553 MRI BRAIN STEM W/O & W/DYE: CPT

## 2020-12-31 PROCEDURE — 6360000002 HC RX W HCPCS: Performed by: INTERNAL MEDICINE

## 2020-12-31 PROCEDURE — 2500000003 HC RX 250 WO HCPCS: Performed by: NURSE ANESTHETIST, CERTIFIED REGISTERED

## 2020-12-31 PROCEDURE — 88305 TISSUE EXAM BY PATHOLOGIST: CPT

## 2020-12-31 PROCEDURE — 1200000000 HC SEMI PRIVATE

## 2020-12-31 PROCEDURE — 3600007511: Performed by: SURGERY

## 2020-12-31 PROCEDURE — 3700000000 HC ANESTHESIA ATTENDED CARE: Performed by: SURGERY

## 2020-12-31 PROCEDURE — 2580000003 HC RX 258: Performed by: NURSE ANESTHETIST, CERTIFIED REGISTERED

## 2020-12-31 PROCEDURE — P9016 RBC LEUKOCYTES REDUCED: HCPCS

## 2020-12-31 PROCEDURE — 2709999900 HC NON-CHARGEABLE SUPPLY: Performed by: SURGERY

## 2020-12-31 RX ORDER — PANTOPRAZOLE SODIUM 40 MG/1
40 TABLET, DELAYED RELEASE ORAL
Status: DISCONTINUED | OUTPATIENT
Start: 2021-01-01 | End: 2021-01-01 | Stop reason: HOSPADM

## 2020-12-31 RX ORDER — LIDOCAINE HYDROCHLORIDE 20 MG/ML
INJECTION, SOLUTION EPIDURAL; INFILTRATION; INTRACAUDAL; PERINEURAL PRN
Status: DISCONTINUED | OUTPATIENT
Start: 2020-12-31 | End: 2020-12-31 | Stop reason: SDUPTHER

## 2020-12-31 RX ORDER — SODIUM CHLORIDE 9 MG/ML
INJECTION, SOLUTION INTRAVENOUS CONTINUOUS PRN
Status: DISCONTINUED | OUTPATIENT
Start: 2020-12-31 | End: 2020-12-31 | Stop reason: SDUPTHER

## 2020-12-31 RX ORDER — MEPERIDINE HYDROCHLORIDE 25 MG/ML
12.5 INJECTION INTRAMUSCULAR; INTRAVENOUS; SUBCUTANEOUS EVERY 5 MIN PRN
Status: DISCONTINUED | OUTPATIENT
Start: 2020-12-31 | End: 2020-12-31 | Stop reason: HOSPADM

## 2020-12-31 RX ORDER — PROMETHAZINE HYDROCHLORIDE 25 MG/ML
6.25 INJECTION, SOLUTION INTRAMUSCULAR; INTRAVENOUS EVERY 10 MIN PRN
Status: DISCONTINUED | OUTPATIENT
Start: 2020-12-31 | End: 2020-12-31 | Stop reason: HOSPADM

## 2020-12-31 RX ORDER — HYDROCODONE BITARTRATE AND ACETAMINOPHEN 5; 325 MG/1; MG/1
1 TABLET ORAL PRN
Status: DISCONTINUED | OUTPATIENT
Start: 2020-12-31 | End: 2020-12-31 | Stop reason: HOSPADM

## 2020-12-31 RX ORDER — MORPHINE SULFATE 2 MG/ML
1 INJECTION, SOLUTION INTRAMUSCULAR; INTRAVENOUS EVERY 5 MIN PRN
Status: DISCONTINUED | OUTPATIENT
Start: 2020-12-31 | End: 2020-12-31 | Stop reason: HOSPADM

## 2020-12-31 RX ORDER — SUCRALFATE 1 G/1
1 TABLET ORAL EVERY 6 HOURS SCHEDULED
Status: DISCONTINUED | OUTPATIENT
Start: 2020-12-31 | End: 2021-01-01 | Stop reason: HOSPADM

## 2020-12-31 RX ORDER — DILTIAZEM HYDROCHLORIDE 5 MG/ML
20 INJECTION INTRAVENOUS ONCE
Status: COMPLETED | OUTPATIENT
Start: 2021-01-01 | End: 2021-01-01

## 2020-12-31 RX ORDER — MORPHINE SULFATE 2 MG/ML
2 INJECTION, SOLUTION INTRAMUSCULAR; INTRAVENOUS EVERY 5 MIN PRN
Status: DISCONTINUED | OUTPATIENT
Start: 2020-12-31 | End: 2020-12-31 | Stop reason: HOSPADM

## 2020-12-31 RX ORDER — HYDROCODONE BITARTRATE AND ACETAMINOPHEN 5; 325 MG/1; MG/1
2 TABLET ORAL PRN
Status: DISCONTINUED | OUTPATIENT
Start: 2020-12-31 | End: 2020-12-31 | Stop reason: HOSPADM

## 2020-12-31 RX ORDER — 0.9 % SODIUM CHLORIDE 0.9 %
20 INTRAVENOUS SOLUTION INTRAVENOUS ONCE
Status: DISCONTINUED | OUTPATIENT
Start: 2020-12-31 | End: 2021-01-01 | Stop reason: HOSPADM

## 2020-12-31 RX ORDER — METRONIDAZOLE 500 MG/1
500 TABLET ORAL EVERY 8 HOURS SCHEDULED
Status: DISCONTINUED | OUTPATIENT
Start: 2020-12-31 | End: 2020-12-31 | Stop reason: ALTCHOICE

## 2020-12-31 RX ORDER — PROPOFOL 10 MG/ML
INJECTION, EMULSION INTRAVENOUS PRN
Status: DISCONTINUED | OUTPATIENT
Start: 2020-12-31 | End: 2020-12-31 | Stop reason: SDUPTHER

## 2020-12-31 RX ADMIN — SODIUM CHLORIDE, PRESERVATIVE FREE 10 ML: 5 INJECTION INTRAVENOUS at 09:40

## 2020-12-31 RX ADMIN — PROPOFOL 150 MG: 10 INJECTION, EMULSION INTRAVENOUS at 14:41

## 2020-12-31 RX ADMIN — Medication 125 MG: at 23:32

## 2020-12-31 RX ADMIN — SUCRALFATE 1 G: 1 TABLET ORAL at 23:32

## 2020-12-31 RX ADMIN — DIGOXIN 125 MCG: 125 TABLET ORAL at 09:39

## 2020-12-31 RX ADMIN — SUCRALFATE 1 G: 1 TABLET ORAL at 17:03

## 2020-12-31 RX ADMIN — SODIUM CHLORIDE: 9 INJECTION, SOLUTION INTRAVENOUS at 14:34

## 2020-12-31 RX ADMIN — SODIUM CHLORIDE, PRESERVATIVE FREE 10 ML: 5 INJECTION INTRAVENOUS at 19:59

## 2020-12-31 RX ADMIN — LIDOCAINE HYDROCHLORIDE 60 MG: 20 INJECTION, SOLUTION EPIDURAL; INFILTRATION; INTRACAUDAL; PERINEURAL at 14:41

## 2020-12-31 RX ADMIN — ATORVASTATIN CALCIUM 40 MG: 40 TABLET, FILM COATED ORAL at 19:59

## 2020-12-31 RX ADMIN — GADOBENATE DIMEGLUMINE 14 ML: 529 INJECTION, SOLUTION INTRAVENOUS at 13:19

## 2020-12-31 RX ADMIN — SODIUM CHLORIDE, POTASSIUM CHLORIDE, SODIUM LACTATE AND CALCIUM CHLORIDE: 600; 310; 30; 20 INJECTION, SOLUTION INTRAVENOUS at 05:01

## 2020-12-31 RX ADMIN — METOPROLOL SUCCINATE 25 MG: 25 TABLET, EXTENDED RELEASE ORAL at 19:59

## 2020-12-31 RX ADMIN — PANTOPRAZOLE SODIUM 40 MG: 40 INJECTION, POWDER, FOR SOLUTION INTRAVENOUS at 09:39

## 2020-12-31 RX ADMIN — METOPROLOL SUCCINATE 25 MG: 25 TABLET, EXTENDED RELEASE ORAL at 09:40

## 2020-12-31 RX ADMIN — Medication 125 MG: at 17:03

## 2020-12-31 ASSESSMENT — PULMONARY FUNCTION TESTS
PIF_VALUE: 0
PIF_VALUE: 1
PIF_VALUE: 0

## 2020-12-31 ASSESSMENT — PAIN SCALES - GENERAL
PAINLEVEL_OUTOF10: 0

## 2020-12-31 NOTE — CARE COORDINATION
Pt scheduled for EGD today.hgb 6.8; to receive 2 u pcells. stool c-diff pended. Episode afib rvr; on Eliquis. Active with virginiat; ROBBIE orders on chart. Lattie July.

## 2020-12-31 NOTE — OP NOTE
Operative Note      Patient: Alla Madrigal  YOB: 1955  MRN: [id number]    Date of Procedure: 12/31/2020    Pre-Op Diagnosis: Gastrointestinal hemorrhage    Post-Op Diagnosis: The GE junction is marked at 40 cm from incisors with minimal reflux changes  Chronic gastritis with some mild superficial ulcerations  2 large duodenal ulcers without active bleeding       Procedure(s):  EGD ESOPHAGOGASTRODUODENOSCOPY ANTRAL BIOPSY    Surgeon(s):  Main Mak MD    Assistant:   * No surgical staff found *    Anesthesia: Monitor Anesthesia Care    Estimated Blood Loss (mL): Minimal    Complications: None    Specimens:   ID Type Source Tests Collected by Time Destination   A : antral biopsy Tissue Tissue SURGICAL PATHOLOGY Main Mak MD 12/31/2020 1443        Implants:  * No implants in log *      Drains: * No LDAs found *    Findings: As above    Detailed Description of Procedure: The patient was brought to the endoscopy suite and placed on the table in the left lateral decubitus position. The patient received anesthesia per the department of anesthesiology. A bite-block was placed. The endoscope was passed without difficulty through the lumen of the esophagus, stomach and duodenum. The endoscope was retrieved. The duodenum had 2 large duodenal ulcerations. No active bleeding was noted. Upon reentry into the stomach, the patient had a chronic gastritis. Biopsies were obtained for H. pylori. The retroflexed view did not reveal a hiatal hernia. The GE junction was marked at 40 cm from the incisors. The remainder of the examination was uneventful. The patient tolerated the procedure well.     Assessment:  Multiple duodenal ulcers    Plan:  Proton pump inhibitor  Carafate  [place] diet    Electronically signed by Main Mak MD on 12/31/2020 at 2:51 PM

## 2020-12-31 NOTE — ANESTHESIA POSTPROCEDURE EVALUATION
Department of Anesthesiology  Postprocedure Note    Patient: Izabel Hunter  MRN: [id number]  YOB: 1955  Date of evaluation: 12/31/2020  Time:  3:50 PM     Procedure Summary     Date: 12/31/20 Room / Location: [facility] 09 / [facility]    Anesthesia Start:  Anesthesia Stop:     Procedure: EGD ESOPHAGOGASTRODUODENOSCOPY (N/A ) Diagnosis: (-)    Surgeons: Isa Kapoor MD Responsible Provider:     Anesthesia Type: MAC ASA Status: 4          Anesthesia Type: MAC    Esteban Phase I:      Esteban Phase II: Esteban Score: 10    Last vitals: Reviewed and per EMR flowsheets.        Anesthesia Post Evaluation    Patient location during evaluation: PACU  Patient participation: complete - patient participated  Level of consciousness: awake  Pain score: 3  Airway patency: patent  Nausea & Vomiting: no nausea and no vomiting  Complications: no  Cardiovascular status: blood pressure returned to baseline  Respiratory status: acceptable  Hydration status: euvolemic

## 2020-12-31 NOTE — ANESTHESIA PRE PROCEDURE
Department of Anesthesiology  Preprocedure Note       Name:  Eddie Regan   Age:  60 y.o.  :  1955                                          MRN:  [id number]         Date:  2020      Surgeon: Shereen Velázquez):  Isha Borden MD    Procedure: Procedure(s):  EGD ESOPHAGOGASTRODUODENOSCOPY    Medications prior to admission:   Prior to Admission medications    Medication Sig Start Date End Date Taking? Authorizing Provider   lisinopril (PRINIVIL;ZESTRIL) 20 MG tablet Take 20 mg by mouth 2 times daily   Yes Historical Provider, MD   magnesium oxide (MAG-OX) 400 MG tablet Take one tablet twice daily for (5) days, then one tablet daily for (7) days. Patient taking differently: one tablet daily for (7) days.  Started 20  Yes Kari Tom DO   digoxin (LANOXIN) 125 MCG tablet Take 1 tablet by mouth daily  Patient taking differently: Take 125 mcg by mouth Daily with lunch  20  Yes Krys Madrid MD   metoprolol succinate (TOPROL XL) 50 MG extended release tablet TAKE 1 TABLET BY MOUTH TWICE A DAY  Patient taking differently: Take 50 mg by mouth 2 times daily Takes along with a 100mg tablet for total of 150mg bid 20  Yes Kari Tom DO   metoprolol succinate (TOPROL XL) 100 MG extended release tablet Take 1 tablet by mouth 2 times daily  Patient taking differently: Take 100 mg by mouth 2 times daily Takes along with a 50mg for total of 150mg BID 20  Yes Kari Tom DO   apixaban (ELIQUIS) 5 MG TABS tablet Take 5 mg by mouth 2 times daily    Yes Historical Provider, MD   atorvastatin (LIPITOR) 40 MG tablet Take 1 tablet by mouth nightly 18  Yes Eleonora Gage MD   calcium carbonate (OSCAL) 500 MG TABS tablet Take 1 tablet by mouth 2 times daily 18  Yes Eleonora Gage MD   dexamethasone (DECADRON) 4 MG tablet Take 4 mg by mouth See Admin Instructions Take 2 tabs day before, day of, and day after chemotherapy    Historical Provider, MD ondansetron (ZOFRAN) 8 MG tablet Take 8 mg by mouth every 8 hours as needed for Nausea or Vomiting States she has not needed to take recently. Historical Provider, MD   acetaminophen (TYLENOL) 325 MG tablet Take 2 tablets by mouth every 4 hours as needed for Pain 2/12/18   Hemalatha Maria MD       Current medications:    Current Facility-Administered Medications   Medication Dose Route Frequency Provider Last Rate Last Admin    0.9 % sodium chloride bolus  20 mL Intravenous Once Idalia Conley DO   Stopped at 12/31/20 7952    gadobenate dimeglumine (MULTIHANCE) injection 14 mL  14 mL Intravenous ONCE PRN Chapito Waite MD        0.9 % sodium chloride bolus  20 mL Intravenous Once Jaimie Sams DO        atorvastatin (LIPITOR) tablet 40 mg  40 mg Oral Nightly Barbara Schultz MD   40 mg at 12/30/20 2022    digoxin (LANOXIN) tablet 125 mcg  125 mcg Oral Daily Barbara Schultz MD   125 mcg at 12/31/20 3301    metoprolol succinate (TOPROL XL) extended release tablet 25 mg  25 mg Oral BID Barbara Schultz MD   25 mg at 12/31/20 0940    sodium chloride flush 0.9 % injection 10 mL  10 mL Intravenous 2 times per day Barbara Schultz MD   10 mL at 12/31/20 0940    sodium chloride flush 0.9 % injection 10 mL  10 mL Intravenous PRN Barbara Schultz MD        polyethylene glycol (GLYCOLAX) packet 17 g  17 g Oral Daily PRN Barbara Schultz MD        acetaminophen (TYLENOL) tablet 650 mg  650 mg Oral Q6H PRN Barbara Schultz MD   650 mg at 12/30/20 3012    Or    acetaminophen (TYLENOL) suppository 650 mg  650 mg Rectal Q6H PRN Barbara Schultz MD        pantoprazole (PROTONIX) injection 40 mg  40 mg Intravenous BID Barbara Schultz MD   40 mg at 12/31/20 0353    lactated ringers infusion   Intravenous Continuous Barbara Schultz MD   Stopped at 12/31/20 1230       Allergies:     Allergies   Allergen Reactions    Adhesive Tape Rash       Problem List:    Patient Active Problem List   Diagnosis Code  TIA (transient ischemic attack) G45.9    Acute CVA (cerebrovascular accident) ([address] I63.9    Transient cerebral ischemia G45.9    PAF (paroxysmal atrial fibrillation) (HCC) I48.0    Breast CA (HCC) C50.919    SARAN (acute kidney injury) ([address] N17.9    Chemotherapy adverse reaction T45.1X5A    Leukocytosis D72.829    Septic shock with acute organ dysfunction due to Gram positive cocci (HCC) A41.89, R65.21    COVID-19 U07.1    Atrial fibrillation with RVR (HCC) I48.91    GI bleed K92.2    History of 2019 novel coronavirus disease (COVID-19) Z86.19       Past Medical History:        Diagnosis Date    Acute CVA (cerebrovascular accident) ([address] 02/10/2018    SARAN (acute kidney injury) ([address] 12/03/2020    Atrial fibrillation with RVR ([address] 12/04/2020    Breast CA ([facility] 12/03/2020    Cancer ([address] 11/2020    Breast-left and lymph nodes     Chemotherapy adverse reaction 12/03/2020    GI bleed 12/29/2020    History of 2019 novel coronavirus disease (COVID-19) 12/03/2000    Hyperlipidemia     Hypertension     Leukocytosis 12/03/2020    Osteoarthritis     PAF (paroxysmal atrial fibrillation) (HCC) 02/26/2018    Septic shock with acute organ dysfunction due to Gram positive cocci (HCC) 12/04/2020    Stroke due to embolism of right posterior cerebral artery (HCC)     TIA (transient ischemic attack) 02/09/2018    Transient cerebral ischemia        Past Surgical History:        Procedure Laterality Date    CERVIX SURGERY      COLONOSCOPY  2010    Negative findings    SAUL US PERQ DEVICE SOFT TISSUE PLMT  FIRST LESION  09/30/2020    SAUL US PERQ DEVICE SOFT TISSUE PLMT  FIRST LESION 9/30/2020 [facility]    PORT SURGERY N/A 11/13/2020    POWER PORT INSERTION, RIGHT SUBCLAVIAN performed by Isha Borden MD at [address] [place] [address] N/A 12/06/2020    PORT REMOVAL performed by Isha Borden MD at [facility]  05/2018    No shunt; no vegetation  US BREAST NEEDLE BIOPSY LEFT  09/30/2020    US BREAST NEEDLE BIOPSY LEFT 9/30/2020 [facility]    WISDOM TOOTH EXTRACTION         Social History:    Social History     Tobacco Use    Smoking status: Never Smoker    Smokeless tobacco: Never Used   Substance Use Topics    Alcohol use: Not Currently     Comment: rare                                Counseling given: Not Answered      Vital Signs (Current):   Vitals:    12/30/20 2015 12/31/20 0930 12/31/20 1000 12/31/20 1215   BP: 120/62 113/67 134/68 129/74   Pulse: 95 113 109 102   Resp: 16 18 18 18   Temp: 98 °F (36.7 °C) 97.9 °F (36.6 °C) 97.7 °F (36.5 °C) 98.1 °F (36.7 °C)   TempSrc: Oral Oral Oral Oral   SpO2: 94% 95% 96% 98%   Weight:  158 lb 8.2 oz (71.9 kg)     Height:                                                  BP Readings from Last 3 Encounters:   12/31/20 129/74   12/08/20 133/80   12/06/20 114/68       NPO Status: Time of last liquid consumption: 2300                        Time of last solid consumption: 2300                        Date of last liquid consumption: 12/30/20                        Date of last solid food consumption: 12/30/20    BMI:   Wt Readings from Last 3 Encounters:   12/31/20 158 lb 8.2 oz (71.9 kg)   12/03/20 173 lb (78.5 kg)   11/13/20 173 lb (78.5 kg)     Body mass index is 24.1 kg/m².     CBC:   Lab Results   Component Value Date    WBC 10.9 12/31/2020    RBC 2.32 12/31/2020    HGB 6.8 12/31/2020    HCT 23.4 12/31/2020    .9 12/31/2020    RDW 18.4 12/31/2020     12/31/2020       CMP:   Lab Results   Component Value Date     12/30/2020    K 3.8 12/30/2020     12/30/2020    CO2 22 12/30/2020    BUN 11 12/30/2020    CREATININE 0.8 12/30/2020    GFRAA >60 12/30/2020    LABGLOM >60 12/30/2020    GLUCOSE 128 12/30/2020    PROT 5.9 12/29/2020    CALCIUM 8.3 12/30/2020    BILITOT 0.3 12/29/2020    ALKPHOS 128 12/29/2020    AST 27 12/29/2020    ALT 15 12/29/2020 POC Tests: No results for input(s): POCGLU, POCNA, POCK, POCCL, POCBUN, POCHEMO, POCHCT in the last 72 hours. Coags:   Lab Results   Component Value Date    PROTIME 17.6 12/29/2020    INR 1.5 12/29/2020    APTT 24.8 12/03/2020       HCG (If Applicable): No results found for: PREGTESTUR, PREGSERUM, HCG, HCGQUANT     ABGs: No results found for: PHART, PO2ART, TME9FAD, RXA6GKX, BEART, U1CWGCWA     Type & Screen (If Applicable):  No results found for: LABABO, LABRH    Drug/Infectious Status (If Applicable):  No results found for: HIV, HEPCAB    COVID-19 Screening (If Applicable):   Lab Results   Component Value Date    COVID19 Not Detected 12/30/2020     Atrial fibrillation with rapid ventricular response  No previous ECGs available  Confirmed by Joy German ([id number]) on 12/3/2020 2:52:42 PM    Anesthesia Evaluation  Patient summary reviewed no history of anesthetic complications:   Airway: Mallampati: II  TM distance: >3 FB     Mouth opening: > = 3 FB Dental:          Pulmonary: breath sounds clear to auscultation      Pneumonia: COVID19 pneumonia. ROS comment: COVID19 INFECTION    CXR 12/2020:  FINDINGS:   Stable implanted central venous catheter on the right.  There is new linear   opacity in the right mid lung which is likely atelectasis.  Stable   cardiomediastinal silhouette.  Neither costophrenic angle is visibly blunted. Cardiovascular:    (+) hypertension:, dysrhythmias (paroxysmal atrial fibrillation, Afib with RVR): atrial fibrillation, hyperlipidemia      ECG reviewed  Rhythm: regular  Rate: normal  Echocardiogram reviewed         Beta Blocker:  Dose within 24 Hrs      ROS comment: EKG 12/3/2020: SVT    Echo 5/2018:  Summary   Normal left ventricle size and systolic function. Mild mitral regurgitation. Moderately dilated left atrium.      Neuro/Psych:   (+) CVA (2018 - no residual weakness;  on eliquis ):,    (-) TIA GI/Hepatic/Renal: Neg GI/Hepatic/Renal ROS            Endo/Other:    (+) blood dyscrasia: anticoagulation therapy, arthritis:., electrolyte abnormalities (Hypernatremia), malignancy/cancer (left breast cancer). ROS comment: Gram positive septicemia Abdominal:           Vascular: negative vascular ROS. Anesthesia Plan      MAC     ASA 4     (Backup GA if needed)  Induction: intravenous. Medical record reviewed and preoperative evaluation updated based on review. Patient not examined due to COVID19 infection. Patient to be evaluated on DOS by anesthesiologist.        Garrick Hancock MD   12/31/2020     DOS STAFF ADDENDUM:    Patient seen and chart reviewed on DOS. No interval change in history or exam.   I agree with the anesthesia pre-operative assessment written above and have made the appropriate addendums and/or changes. Anesthesia plan discussed and risks/benefits addressed.      Garrick Hancock MD  December 31, 2020  1:11 PM

## 2020-12-31 NOTE — PROGRESS NOTES
Nursing Transfer Note    Data:  Summary of patients progress: general anesthesia recovery    Reason for transfer: PACU discharge criteria met, transferred to next level of care. Action:  Explained reason for transfer to Patient/Family  Report given to:rn, using RN Handoff Navigator.   Mode of transportation: Cart    Response:  RN Recommendations:contiuation of care

## 2020-12-31 NOTE — PROGRESS NOTES
Subjective: The patient is awake and alert in bed, she complains of having diarrhea. C-DIFF r/o. She is about to get 2 unit of blood and scheduled for EGD today. No problems overnight. Denies chest pain, angina, dyspnea or abdominal discomfort. No N&V currently and is NPO. Denies any vision changes or headaches. Objective:    /62   Pulse 95   Temp 98 °F (36.7 °C) (Oral)   Resp 16   Ht 5' 8\" (1.727 m)   Wt 160 lb (72.6 kg)   SpO2 94%   BMI 24.33 kg/m²     General: NAD, pleasant, no distress  HEENT: No thrush or mucositis, EOMI, PERRLA  Heart:  RRR, no murmurs, gallops, or rubs.   Lungs:  CTA bilaterally, no wheeze, rales or rhonchi  Abd: bowel sounds present, nontender, nondistended, no masses  Extrem:  No clubbing, cyanosis, or edema  Lymphatics: No palpable adenopathy in cervical and supraclavicular regions  Skin: Intact, no petechia or purpura    CBC with Differential:    Lab Results   Component Value Date    WBC 10.9 12/31/2020    RBC 2.32 12/31/2020    HGB 6.8 12/31/2020    HCT 23.4 12/31/2020     12/31/2020    .9 12/31/2020    MCH 29.3 12/31/2020    MCHC 29.1 12/31/2020    RDW 18.4 12/31/2020    NRBC 0.0 12/07/2020    METASPCT 3.5 12/07/2020    LYMPHOPCT 14.0 12/31/2020    MONOPCT 7.0 12/31/2020    BASOPCT 0.2 12/31/2020    MONOSABS 0.77 12/31/2020    LYMPHSABS 1.53 12/31/2020    EOSABS 0.11 12/31/2020    BASOSABS 0.02 12/31/2020     CMP:    Lab Results   Component Value Date     12/30/2020    K 3.8 12/30/2020     12/30/2020    CO2 22 12/30/2020    BUN 11 12/30/2020    CREATININE 0.8 12/30/2020    GFRAA >60 12/30/2020    LABGLOM >60 12/30/2020    GLUCOSE 128 12/30/2020    PROT 5.9 12/29/2020    LABALBU 3.2 12/29/2020    CALCIUM 8.3 12/30/2020    BILITOT 0.3 12/29/2020    ALKPHOS 128 12/29/2020    AST 27 12/29/2020    ALT 15 12/29/2020       Current Facility-Administered Medications:   0.9 % sodium chloride bolus, 20 mL, Intravenous, Once, Luis M Do DO, Stopped at 12/31/20 1658    0.9 % sodium chloride bolus, 20 mL, Intravenous, Once, Shani Daniel DO    atorvastatin (LIPITOR) tablet 40 mg, 40 mg, Oral, Nightly, Kirill Brito MD, 40 mg at 12/30/20 2022    digoxin (LANOXIN) tablet 125 mcg, 125 mcg, Oral, Daily, Kirill Brito MD, 125 mcg at 12/30/20 0364    metoprolol succinate (TOPROL XL) extended release tablet 25 mg, 25 mg, Oral, BID, Kirill Brito MD, 25 mg at 12/30/20 2022    sodium chloride flush 0.9 % injection 10 mL, 10 mL, Intravenous, 2 times per day, Kirill Brito MD, 10 mL at 12/30/20 2022    sodium chloride flush 0.9 % injection 10 mL, 10 mL, Intravenous, PRN, Kirill Brito MD    polyethylene glycol (GLYCOLAX) packet 17 g, 17 g, Oral, Daily PRN, Kirill Brito MD    acetaminophen (TYLENOL) tablet 650 mg, 650 mg, Oral, Q6H PRN, 650 mg at 12/30/20 0834 **OR** acetaminophen (TYLENOL) suppository 650 mg, 650 mg, Rectal, Q6H PRN, Kirill Brito MD    pantoprazole (PROTONIX) injection 40 mg, 40 mg, Intravenous, BID, Kirill Brito MD, 40 mg at 12/30/20 2022    lactated ringers infusion, , Intravenous, Continuous, Kirill Brito MD, Last Rate: 100 mL/hr at 12/31/20 0501, New Bag at 12/31/20 0501    Assessment:    Principal Problem:    GI bleed  Active Problems:    Acute [address]A (cerebrovascular accident) ([facility]    PAF (paroxysmal atrial fibrillation) ([facility]    Breast CA ([facility]    Atrial fibrillation with RVR ([facility]    History of 2019 novel coronavirus disease (COVID-19)  Resolved Problems:    * No resolved hospital problems.  * 30-year-old woman who is known to Dr. Henry Son with clinical stage II ER positive, HER-2/sheba positive left breast cancer, she had a first cycle of neoadjuvant chemotherapy with carboplatin, Taxotere, trastuzumab and Perjeta last treatment November 20, 2020. Was recently admitted with COVID-19 infection and septicemia d/t line infection s/p removal of port early December 2020  Presented with black colored stool and fatigue. Occult blood was positive in the ED. She also states she fell yesterday onto her chair d/t lightheadedness /dizziness. Labs on admission showed WBC 12.6, hemoglobin 6.7, hematocrit 22.3, .5, platelets 030. CT the head showed area of low attenuation within the right occipital lobe measuring 1.3 x 1.6 x 2.0 likely representing an old occipital lobe infarct. CXR showed patchy multifocal BL pulmonary infiltrates. Plan:  Maintain hemoglobin >7g/dL.  Transfuse as needed- HGB 6.8 today- 2 units were ordered to be given today   Will order MRI w/wo contrast to evaluate area in occipital lobe, likely representing the old occipital infarct   CBC with diff daily  She was taking apixaban for hx of afib, plan for EGD today   PPI  Continue full supportive care      Electronically signed by LALA Boyd NP on 12/31/2020 at 8:54 AM

## 2020-12-31 NOTE — PROGRESS NOTES
PROGRESS  NOTE --                                                          INTERNAL  MEDICINE                                                                              I  PERSONALLY SAW , EXAMINED, AND CARED [address], 12/31/2020     LABS, XRAY ,CHART, AND MEDICATIONS  REVIEWED BY ME       Chief complaint: Rectal bleeding, diarrhea, fatigue, lightheadedness      12/31/2020-SUBJECTIVE: Erinn Milligan is alert awake and cooperative; oriented ×3. Denies any chest pain dyspnea nausea emesis. Still having diarrhea. Highest temperature last 24 hours 100.2. Current blood pressure 107/52. Pulse 117.  93% saturation on room air. Intake and output +4132 cc. Hemoglobin this morning 6.8; 2 units packed red cells were ordered. C. difficile toxin a and/or B detected. Enteric bacteria present. Surgical consult from yesterday appreciated; I spoke with surgeon who is planning on EGD today. Oncology consult from today appreciated; maintain hemoglobin greater than 7; MRI brain to evaluate occipital lobe. EGD performed today with chronic gastritis some superficial mild ulcerations to large duodenal ulcers without active bleeding. Recommend proton pump inhibitor, Carafate, bland diet.         Objective:     PHYSICAL EXAM:    VS: BP (!) 107/52   Pulse 117   Temp 98 °F (36.7 °C) (Temporal)   Resp 18   Ht 5' 8\" (1.727 m)   Wt 158 lb 8.2 oz (71.9 kg)   SpO2 93%   BMI 24.10 kg/m²     Labs:   CBC:   Lab Results   Component Value Date    WBC 10.9 12/31/2020    RBC 2.32 12/31/2020    HGB 6.8 12/31/2020    HCT 23.4 12/31/2020    .9 12/31/2020    MCH 29.3 12/31/2020    MCHC 29.1 12/31/2020    RDW 18.4 12/31/2020     12/31/2020    MPV 10.9 12/31/2020     CBC with Differential:    Lab Results   Component Value Date    WBC 10.9 12/31/2020    RBC 2.32 12/31/2020    HGB 6.8 12/31/2020    HCT 23.4 12/31/2020  12/31/2020    .9 12/31/2020    MCH 29.3 12/31/2020    MCHC 29.1 12/31/2020    RDW 18.4 12/31/2020    NRBC 0.0 12/07/2020    METASPCT 3.5 12/07/2020    LYMPHOPCT 14.0 12/31/2020    MONOPCT 7.0 12/31/2020    BASOPCT 0.2 12/31/2020    MONOSABS 0.77 12/31/2020    LYMPHSABS 1.53 12/31/2020    EOSABS 0.11 12/31/2020    BASOSABS 0.02 12/31/2020     Hemoglobin/Hematocrit:    Lab Results   Component Value Date    HGB 6.8 12/31/2020    HCT 23.4 12/31/2020     CMP:    Lab Results   Component Value Date     12/30/2020    K 3.8 12/30/2020     12/30/2020    CO2 22 12/30/2020    BUN 11 12/30/2020    CREATININE 0.8 12/30/2020    GFRAA >60 12/30/2020    LABGLOM >60 12/30/2020    GLUCOSE 128 12/30/2020    PROT 5.9 12/29/2020    LABALBU 3.2 12/29/2020    CALCIUM 8.3 12/30/2020    BILITOT 0.3 12/29/2020    ALKPHOS 128 12/29/2020    AST 27 12/29/2020    ALT 15 12/29/2020     BMP:    Lab Results   Component Value Date     12/30/2020    K 3.8 12/30/2020     12/30/2020    CO2 22 12/30/2020    BUN 11 12/30/2020    LABALBU 3.2 12/29/2020    CREATININE 0.8 12/30/2020    CALCIUM 8.3 12/30/2020    GFRAA >60 12/30/2020    LABGLOM >60 12/30/2020    GLUCOSE 128 12/30/2020     Hepatic Function Panel:    Lab Results   Component Value Date    ALKPHOS 128 12/29/2020    ALT 15 12/29/2020    AST 27 12/29/2020    PROT 5.9 12/29/2020    BILITOT 0.3 12/29/2020    LABALBU 3.2 12/29/2020     Ionized Calcium:  No results found for: IONCA  Magnesium:    Lab Results   Component Value Date    MG 1.9 12/31/2020     Uric Acid:  No results found for: LABURIC, URICACID  PT/INR:    Lab Results   Component Value Date    PROTIME 17.6 12/29/2020    INR 1.5 12/29/2020     U/A:    Lab Results   Component Value Date    COLORU DARK YELLOW 12/29/2020    PROTEINU Negative 12/29/2020    PHUR 5.5 12/29/2020    WBCUA 10-20 12/29/2020    RBCUA NONE 12/29/2020    MUCUS Present 12/29/2020    BACTERIA MANY 12/29/2020 CLARITYU CLOUDY 12/29/2020    SPECGRAV 1.025 12/29/2020    LEUKOCYTESUR TRACE 12/29/2020    UROBILINOGEN 0.2 12/29/2020    BILIRUBINUR SMALL 12/29/2020    BLOODU Negative 12/29/2020    GLUCOSEU Negative 12/29/2020    AMORPHOUS PRESENT 12/29/2020     HgBA1c:    Lab Results   Component Value Date    LABA1C 5.5 02/10/2018     FLP:    Lab Results   Component Value Date    TRIG 57 02/10/2018    HDL 70 02/10/2018    LDLCALC 138 02/10/2018    LABVLDL 11 02/10/2018     TSH:    Lab Results   Component Value Date    TSH 1.330 02/23/2018     VITAMIN B12: No components found for: B12  FOLATE:  No results found for: FOLATE  IRON:  No results found for: IRON  Iron Saturation:  No components found for: PERCENTFE  TIBC:  No results found for: TIBC  FERRITIN:  No results found for: FERRITIN     General appearance: Alert, Awake, Oriented times 3, no distress  Skin: Warm and dry ; no rashes  Head: Normocephalic. No masses, lesions or tenderness noted  Eyes: Conjunctivae pale, sclera white. PERRL,EOM-I  Ears: External ears normal  Nose/Sinuses: Nares normal. Septum midline. Mucosa normal. No drainage  Oropharynx: Oropharynx clear with no exudate seen  Neck: Supple. No jugular venous distension, lymphadenopathy or thyromegaly Trachea midline  Lungs: Clear to auscultation bilaterally. No rhonchi, crackles or wheezes  Heart: Irregular irregular at 90/min  Abdomen: Soft, non-tender. BS normal. No masses, organomegaly; no rebound or guarding  Extremities: +1 chronic edema, Peripheral pulses palpable  Musculoskeletal: Muscular strength appears fair  Neuro:  No focal motor defects ; II-XII grossly intact .  RAMEY equally    TELEMETRY: REVIEWED--Telemetry: Sinus    ASSESSMENT:   Principal Problem:    GI bleed  Active Problems:    Acute CVA (cerebrovascular accident) ([facility] [address]    PAF (paroxysmal atrial fibrillation) ([facility] [address]    Breast CA ([facility] [address]    Atrial fibrillation with RVR ([facility] [address]    History of 2019 novel coronavirus disease (COVID-19) Resolved Problems:    * No resolved hospital problems. *      PLAN:  SEE ORDERS      RE  CHANGES AND FINDINGS   Medications reviewed with patient  GI prophylaxis  DVT prophylaxis  Consultants notes reviewed    Transfuse 2 units packed red blood cells today  Digoxin 125 mcg daily  Digoxin 250 mcg IV given yesterday  Potassium chloride 20 mEq yesterday  Diltiazem 60 mg 1 time  Toprol-XL 25 mg twice daily  Metronidazole 500 mg by mouth 3 times daily  Monitor labs      Discussed with patient and nursing. Tulio Ybarra DO     3:04 PM     12/31/2020    TIME > 25 MINUTES    >  50 %  OF  TIME  DISCUSSION               ------------  INFORMATION  -----------      DIET:Diet NPO, After Midnight        Allergies   Allergen Reactions    Adhesive Tape Rash         MEDICATION SIDE EFFECTS:none       SCHEDULED MEDS:                                 Scheduled Meds:   sodium chloride  20 mL Intravenous Once    sodium chloride  20 mL Intravenous Once    atorvastatin  40 mg Oral Nightly    digoxin  125 mcg Oral Daily    metoprolol succinate  25 mg Oral BID    sodium chloride flush  10 mL Intravenous 2 times per day       Continuous Infusions:      Data:       Intake/Output Summary (Last 24 hours) at 12/31/2020 1504  Last data filed at 12/31/2020 1439  Gross per 24 hour   Intake 2398.67 ml   Output    Net 2398.67 ml       Wt Readings from Last 3 Encounters:   12/31/20 158 lb 8.2 oz (71.9 kg)   12/03/20 173 lb (78.5 kg)   11/13/20 173 lb (78.5 kg)       Labs: Additional    GLUCOSE:No results for input(s): POCGLU in the last 72 hours. BNP:No results found for: BNP    CRP: No results for input(s): CRP in the last 72 hours. ESR:No results for input(s): SEDRATE in the last 72 hours. RADIOLOGY: REVIEWED AVAILABLE REPORT  MRI BRAIN W WO CONTRAST   Final Result   1. No acute intracranial ischemia, edema, or mass. 2.  Chronic right occipital stroke. 3.  Moderate burden of nonspecific foci of abnormal signal throughout   periventricular and subcortical white matter, yet likely related to areas of   chronic microvascular ischemia. 4.  Fluid signal at level of right mastoid air cells could suggest   mastoiditis with appropriate clinical history. CT Head WO Contrast   Final Result   1. Area of low attenuation within the right occipital lobe measuring 1.3 x   1.6 x 2.0 cm likely representing an old occipital lobe infarct. Metastatic   disease cannot be completely excluded given the patient's history of breast   CA. This finding was not present on the patient's prior study of 02/09/2018. This finding should be further evaluated with either an MRI of the brain with   and without contrast or CT scan of the brain with IV contrast to evaluate for   any peripheral enhancement. XR CHEST (2 VW)   Final Result   Patchy multifocal bilateral pulmonary infiltrates.                 [address]   3:04 PM     12/31/2020      Voice recognition software used for dictation

## 2021-01-01 VITALS
OXYGEN SATURATION: 94 % | HEART RATE: 97 BPM | DIASTOLIC BLOOD PRESSURE: 71 MMHG | WEIGHT: 158.51 LBS | TEMPERATURE: 98 F | SYSTOLIC BLOOD PRESSURE: 149 MMHG | HEIGHT: 68 IN | BODY MASS INDEX: 24.02 KG/M2 | RESPIRATION RATE: 18 BRPM

## 2021-01-01 PROBLEM — A04.72 C. DIFFICILE DIARRHEA: Status: ACTIVE | Noted: 2021-01-01

## 2021-01-01 LAB
BASOPHILS ABSOLUTE: 0.02 E9/L (ref 0–0.2)
BASOPHILS RELATIVE PERCENT: 0.2 % (ref 0–2)
CULTURE, STOOL: NORMAL
EOSINOPHILS ABSOLUTE: 0.19 E9/L (ref 0.05–0.5)
EOSINOPHILS RELATIVE PERCENT: 2 % (ref 0–6)
HCT VFR BLD CALC: 30.6 % (ref 34–48)
HEMOGLOBIN: 10 G/DL (ref 11.5–15.5)
IMMATURE GRANULOCYTES #: 0.03 E9/L
IMMATURE GRANULOCYTES %: 0.3 % (ref 0–5)
LYMPHOCYTES ABSOLUTE: 1.25 E9/L (ref 1.5–4)
LYMPHOCYTES RELATIVE PERCENT: 13.3 % (ref 20–42)
MCH RBC QN AUTO: 29.9 PG (ref 26–35)
MCHC RBC AUTO-ENTMCNC: 32.7 % (ref 32–34.5)
MCV RBC AUTO: 91.3 FL (ref 80–99.9)
MONOCYTES ABSOLUTE: 0.78 E9/L (ref 0.1–0.95)
MONOCYTES RELATIVE PERCENT: 8.3 % (ref 2–12)
NEUTROPHILS ABSOLUTE: 7.1 E9/L (ref 1.8–7.3)
NEUTROPHILS RELATIVE PERCENT: 75.9 % (ref 43–80)
PDW BLD-RTO: 16.8 FL (ref 11.5–15)
PLATELET # BLD: 204 E9/L (ref 130–450)
PMV BLD AUTO: 11 FL (ref 7–12)
RBC # BLD: 3.35 E12/L (ref 3.5–5.5)
WBC # BLD: 9.4 E9/L (ref 4.5–11.5)

## 2021-01-01 PROCEDURE — 2580000003 HC RX 258: Performed by: INTERNAL MEDICINE

## 2021-01-01 PROCEDURE — 85025 COMPLETE CBC W/AUTO DIFF WBC: CPT

## 2021-01-01 PROCEDURE — 6370000000 HC RX 637 (ALT 250 FOR IP): Performed by: SURGERY

## 2021-01-01 PROCEDURE — 36415 COLL VENOUS BLD VENIPUNCTURE: CPT

## 2021-01-01 PROCEDURE — 2500000003 HC RX 250 WO HCPCS: Performed by: INTERNAL MEDICINE

## 2021-01-01 PROCEDURE — 6370000000 HC RX 637 (ALT 250 FOR IP): Performed by: INTERNAL MEDICINE

## 2021-01-01 RX ORDER — PANTOPRAZOLE SODIUM 40 MG/1
40 TABLET, DELAYED RELEASE ORAL
Qty: 30 TABLET | Refills: 3 | Status: SHIPPED | OUTPATIENT
Start: 2021-01-02 | End: 2021-10-07 | Stop reason: ALTCHOICE

## 2021-01-01 RX ORDER — SUCRALFATE 1 G/1
1 TABLET ORAL 4 TIMES DAILY
Qty: 120 TABLET | Refills: 3 | Status: SHIPPED | OUTPATIENT
Start: 2021-01-01 | End: 2021-10-07 | Stop reason: ALTCHOICE

## 2021-01-01 RX ADMIN — Medication 125 MG: at 05:28

## 2021-01-01 RX ADMIN — DIGOXIN 125 MCG: 125 TABLET ORAL at 09:28

## 2021-01-01 RX ADMIN — DILTIAZEM HYDROCHLORIDE 20 MG: 5 INJECTION INTRAVENOUS at 00:14

## 2021-01-01 RX ADMIN — SUCRALFATE 1 G: 1 TABLET ORAL at 05:28

## 2021-01-01 RX ADMIN — METOPROLOL SUCCINATE 25 MG: 25 TABLET, EXTENDED RELEASE ORAL at 09:28

## 2021-01-01 RX ADMIN — PANTOPRAZOLE SODIUM 40 MG: 40 TABLET, DELAYED RELEASE ORAL at 05:28

## 2021-01-01 RX ADMIN — SUCRALFATE 1 G: 1 TABLET ORAL at 11:08

## 2021-01-01 RX ADMIN — SODIUM CHLORIDE, PRESERVATIVE FREE 10 ML: 5 INJECTION INTRAVENOUS at 09:28

## 2021-01-01 RX ADMIN — Medication 125 MG: at 11:08

## 2021-01-01 ASSESSMENT — PAIN SCALES - GENERAL
PAINLEVEL_OUTOF10: 0
PAINLEVEL_OUTOF10: 0

## 2021-01-01 NOTE — PROGRESS NOTES
1/1/2021patient laying quietly in bed; no chest pain no dyspnea. Still has loose stools. Extensive discussion regarding findings from yesterday, duodenal ulcers, gastric irritation, biopsy for H. pylori pending etc.  She has numerous questions regarding diet all discussed at length. Afebrile last 24 hours. Blood pressure 141/71.  94% saturation on room air. Current pulse 97; has been as high as 146 overnight cardiology was notified. Intake and output +4697 cc. Hemoglobin 10.0 WBC 9.4. Shigella Salmonella etc. all negative. C. difficile was positive. I originally placed patient on Flagyl; pharmacy called because of QTC interval metronidazole was discontinued and oral vancomycin was started. I spoke with surgeon; his note from today multiple duodenal ulcers definitively the source of GI hemorrhage.         Objective:     PHYSICAL EXAM:    VS: BP (!) 149/71   Pulse 97   Temp 98 °F (36.7 °C) (Oral)   Resp 18   Ht 5' 8\" (1.727 m)   Wt 158 lb 8.2 oz (71.9 kg)   SpO2 94%   BMI 24.10 kg/m²     Labs:   CBC:   Lab Results   Component Value Date    WBC 9.4 01/01/2021    RBC 3.35 01/01/2021    HGB 10.0 01/01/2021    HCT 30.6 01/01/2021    MCV 91.3 01/01/2021    MCH 29.9 01/01/2021    MCHC 32.7 01/01/2021    RDW 16.8 01/01/2021     01/01/2021    MPV 11.0 01/01/2021     CBC with Differential:    Lab Results   Component Value Date    WBC 9.4 01/01/2021    RBC 3.35 01/01/2021    HGB 10.0 01/01/2021    HCT 30.6 01/01/2021     01/01/2021    MCV 91.3 01/01/2021    MCH 29.9 01/01/2021    MCHC 32.7 01/01/2021    RDW 16.8 01/01/2021    NRBC 0.0 12/07/2020    METASPCT 3.5 12/07/2020    LYMPHOPCT 13.3 01/01/2021    MONOPCT 8.3 01/01/2021    BASOPCT 0.2 01/01/2021    MONOSABS 0.78 01/01/2021    LYMPHSABS 1.25 01/01/2021    EOSABS 0.19 01/01/2021    BASOSABS 0.02 01/01/2021     Hemoglobin/Hematocrit:    Lab Results   Component Value Date    HGB 10.0 01/01/2021    HCT 30.6 01/01/2021     CMP:    Lab Results Component Value Date     12/30/2020    K 3.8 12/30/2020     12/30/2020    CO2 22 12/30/2020    BUN 11 12/30/2020    CREATININE 0.8 12/30/2020    GFRAA >60 12/30/2020    LABGLOM >60 12/30/2020    GLUCOSE 128 12/30/2020    PROT 5.9 12/29/2020    LABALBU 3.2 12/29/2020    CALCIUM 8.3 12/30/2020    BILITOT 0.3 12/29/2020    ALKPHOS 128 12/29/2020    AST 27 12/29/2020    ALT 15 12/29/2020     BMP:    Lab Results   Component Value Date     12/30/2020    K 3.8 12/30/2020     12/30/2020    CO2 22 12/30/2020    BUN 11 12/30/2020    LABALBU 3.2 12/29/2020    CREATININE 0.8 12/30/2020    CALCIUM 8.3 12/30/2020    GFRAA >60 12/30/2020    LABGLOM >60 12/30/2020    GLUCOSE 128 12/30/2020     Hepatic Function Panel:    Lab Results   Component Value Date    ALKPHOS 128 12/29/2020    ALT 15 12/29/2020    AST 27 12/29/2020    PROT 5.9 12/29/2020    BILITOT 0.3 12/29/2020    LABALBU 3.2 12/29/2020     Ionized Calcium:  No results found for: IONCA  Magnesium:    Lab Results   Component Value Date    MG 1.9 12/31/2020     Uric Acid:  No results found for: LABURIC, URICACID  PT/INR:    Lab Results   Component Value Date    PROTIME 17.6 12/29/2020    INR 1.5 12/29/2020     U/A:    Lab Results   Component Value Date    COLORU DARK YELLOW 12/29/2020    PROTEINU Negative 12/29/2020    PHUR 5.5 12/29/2020    WBCUA 10-20 12/29/2020    RBCUA NONE 12/29/2020    MUCUS Present 12/29/2020    BACTERIA MANY 12/29/2020    CLARITYU CLOUDY 12/29/2020    SPECGRAV 1.025 12/29/2020    LEUKOCYTESUR TRACE 12/29/2020    UROBILINOGEN 0.2 12/29/2020    BILIRUBINUR SMALL 12/29/2020    BLOODU Negative 12/29/2020    GLUCOSEU Negative 12/29/2020    AMORPHOUS PRESENT 12/29/2020     HgBA1c:    Lab Results   Component Value Date    LABA1C 5.5 02/10/2018     FLP:    Lab Results   Component Value Date    TRIG 57 02/10/2018    HDL 70 02/10/2018    LDLCALC 138 02/10/2018    LABVLDL 11 02/10/2018     TSH:    Lab Results   Component Value Date TSH 1.330 02/23/2018     VITAMIN B12: No components found for: B12  FOLATE:  No results found for: FOLATE  IRON:  No results found for: IRON  Iron Saturation:  No components found for: PERCENTFE  TIBC:  No results found for: TIBC  FERRITIN:  No results found for: FERRITIN     General appearance: Alert, Awake, Oriented times 3, no distress  Skin: Warm and dry ; no rashes  Head: Normocephalic. No masses, lesions or tenderness noted  Eyes: Conjunctivae pink, sclera white. PERRL,EOM-I  Ears: External ears normal  Nose/Sinuses: Nares normal. Septum midline. Mucosa normal. No drainage  Oropharynx: Oropharynx clear with no exudate seen  Neck: Supple. No jugular venous distension, lymphadenopathy or thyromegaly Trachea midline  Lungs: Clear to auscultation bilaterally. No rhonchi, crackles or wheezes  Heart: Irregularly irregular at 90/min. Unable to hear any murmur gallop or rub. Abdomen: Soft, non-tender. BS normal. No masses, organomegaly; no rebound or guarding  Extremities: +1 chronic edema, Peripheral pulses palpable  Musculoskeletal: Muscular strength appears fair  Neuro:  No focal motor defects ; II-XII grossly intact . RAMEY equally    TELEMETRY: REVIEWED--Telemetry: Atrial fibrillation    ASSESSMENT:   Principal Problem:    GI bleed  Active Problems:    Acute CVA (cerebrovascular accident) ([facility] [address]    PAF (paroxysmal atrial fibrillation) ([facility] [address]    Breast CA ([facility] [address]    Atrial fibrillation with RVR ([facility] [address]    History of 2019 novel coronavirus disease (COVID-19)    C. difficile diarrhea    Hypertension    Stroke due to embolism of right posterior cerebral artery ([facility] [address]  Resolved Problems:    * No resolved hospital problems.  *      PLAN:  SEE ORDERS      RE  CHANGES AND FINDINGS   Medications reviewed with patient  GI prophylaxis  DVT prophylaxis  Consultants notes reviewed    Transfuse 2 units packed red blood cells today  Digoxin 125 mcg daily  Digoxin 250 mcg IV given yesterday  Potassium chloride 20 mEq yesterday 3.  Moderate burden of nonspecific foci of abnormal signal throughout   periventricular and subcortical white matter, yet likely related to areas of   chronic microvascular ischemia. 4.  Fluid signal at level of right mastoid air cells could suggest   mastoiditis with appropriate clinical history. CT Head WO Contrast   Final Result   1. Area of low attenuation within the right occipital lobe measuring 1.3 x   1.6 x 2.0 cm likely representing an old occipital lobe infarct. Metastatic   disease cannot be completely excluded given the patient's history of breast   CA. This finding was not present on the patient's prior study of 02/09/2018. This finding should be further evaluated with either an MRI of the brain with   and without contrast or CT scan of the brain with IV contrast to evaluate for   any peripheral enhancement. XR CHEST (2 VW)   Final Result   Patchy multifocal bilateral pulmonary infiltrates.                 [address]   11:32 AM     1/1/2021      Voice recognition software used for dictation

## 2021-01-01 NOTE — PROGRESS NOTES
Pt is made aware of holding Eliquis for one week, to call Dr. Doreen Garcia on Monday regarding it.  Written in AVS    Dr. Zeinab Gonzalez is made aware of Eliquis being held for one week by Surgery

## 2021-01-01 NOTE — PROGRESS NOTES
Dr. Lakshmi Tinsley updated on pt is unable to get vancomycin from pharmacy today, other options explored, ok to give our bottle until pt gets it from pharmacy

## 2021-01-01 NOTE — PROGRESS NOTES
Progress note:    Overall, the patient states that she feels better. The patient states that her diarrhea has improved. On examination: The abdomen is soft, not distended and nontender. Laboratory studies: The patient's white blood cell count is 9.4. The patient's hemoglobin is 10.0 and the hematocrit is 30.6. Procedures: The EGD performed on 12/31/2020 revealed large duodenal ulcers. Assessment:    C. difficile colitis  Multiple duodenal ulcers definitively the source of her GI hemorrhage    Plan:    Continue treatment of the C. difficile colitis.   Continue Carafate and Protonix  Continue a bland peptic ulcer diet

## 2021-01-01 NOTE — PROGRESS NOTES
RN with [facility] called back and she is aware of pt discharged today, will schedule to see pt,  Face sheet and order faxed to [phone]

## 2021-01-01 NOTE — PROGRESS NOTES
1/1/2021  12:12 PM      Nutrition Education    · Attempted x 2 to verbally review (via phone) PUD with Patient but unable to reach pt. · Written educational materials provided in Discharge Instructions  · Contact name and number provided. SUMMARY:  Pt left a VM on RD's phone re: questions about Peptic Ulcer Diet and mentioned she is also on chemo. Attempted x2 to call pt with no success. Attached a written copy of Peptic Ucer diet and dietitian contact info in Discharge Instructions. Also, provided recommendation for Banatrol Plus (made from banana flakes) for diarrhea, as it is often recommended by [facility] dietitians for breast cancer chemo pt's w/diarrhea.     Electronically signed by Michael Vargas RD, CNSC, LD on 1/1/21 at 12:12 PM EST    Contact: [phone]

## 2021-01-01 NOTE — DISCHARGE INSTR - DIET
? Good nutrition is important when healing from an illness, injury, or surgery. Follow any nutrition recommendations given to you during your hospital stay. ? If you were given an oral nutrition supplement while in the hospital, continue to take this supplement at home. You can take it with meals, in-between meals, and/or before bedtime. These supplements can be purchased at most local grocery stores, pharmacies, and chain super-stores. ? If you have any questions about your diet or nutrition, call your dietitian: Yandel Menendez RD, LD, [address]  at [phone]. Some patients who take chemotherapy for breast cancer can develop diarrhea. Banatrol Plus (made from banana flakes)  is a product we have found useful for this situation. It has also been helpful for other causes of diarrhea. It can be purchased online, pharmacies and other chain super-stores. Peptic Ulcer Diet:    Eat mostly fruits, vegetables, whole grains, and fat-free or low-fat milk and milk products. Eat lean meats, poultry (such as chicken and turkey), fish, beans, eggs, and nuts. Choose fats that are better for your health such as olive oil and canola oil. Eat fewer foods that have added salt. Eat fewer foods that have added sugar. Foods Recommended  Note: The chart shows general recommendations for healthy eating. If you have pain after eating particular foods, avoid those foods (even if they are listed on the chart). Food Group Foods Recommended   Dairy Buttermilk  Evaporated skim milk  Fat-free (skim) or low-fat (1%) milk  Soy milk  Nonfat or low-fat yogurt  Powdered milk  Nonfat or low-fat cheeses  Low-fat ice cream  Sherbet     Proteins Tender, well-cooked lean meat, poultry, fish, eggs, or soy prepared without added fat  Nuts and nut butter (these are higher in fat)     Grains Any prepared without added fat; choose whole grains for at least half of your grain servings.

## 2021-01-01 NOTE — PLAN OF CARE
Problem: Falls - Risk of:  Goal: Will remain free from falls  Description: Will remain free from falls  1/1/2021 1630 by Valentino Mohan RN  Outcome: Completed  1/1/2021 1021 by Valentino Mohan RN  Outcome: Met This Shift  Goal: Absence of physical injury  Description: Absence of physical injury  Outcome: Completed     Problem: Pain:  Goal: Pain level will decrease  Description: Pain level will decrease  Outcome: Completed  Goal: Control of acute pain  Description: Control of acute pain  Outcome: Completed  Goal: Control of chronic pain  Description: Control of chronic pain  Outcome: Completed     Problem: Diarrhea:  Goal: Occurrences of diarrhea will decrease  Description: Occurrences of diarrhea will decrease  1/1/2021 1630 by Valentino Mohan RN  Outcome: Completed  1/1/2021 1021 by Valentino Mohan RN  Outcome: Ongoing     Problem: MOBILITY  Goal: Mobility level is maintained or improved  Outcome: Completed

## 2021-01-01 NOTE — PROGRESS NOTES
Called DR. Sweeney regarding resuming Eliquid at discharge, updated him on Dr. Emily Martínez notes and pt history.  Per dr. Stacey Bueno, hold it for a week

## 2021-01-03 LAB
BLOOD CULTURE, ROUTINE: NORMAL
CULTURE, BLOOD 2: NORMAL

## 2021-01-04 ENCOUNTER — TELEPHONE (OUTPATIENT)
Dept: ADMINISTRATIVE | Age: 66
End: 2021-01-04

## 2021-01-04 NOTE — DISCHARGE SUMMARY
DISCHARGE SUMMARY  Patient ID:  Jael Phipps  [id number]  25 y.o.  1955    Admit date: 12/29/2020      Discharge date : 1/1/2021      Admission Diagnoses: GI bleed [K92.2]    Discharge Diagnosis  Principal Problem:    GI bleed  Active Problems:    Acute CVA (cerebrovascular accident) ([facility]    PAF (paroxysmal atrial fibrillation) ([facility]    Breast CA ([facility]    Atrial fibrillation with RVR [facility]    History of 2019 novel coronavirus disease (COVID-19)    C. difficile diarrhea    Hypertension    Stroke due to embolism of right posterior cerebral artery ([facility]  Resolved Problems:    * No resolved hospital problems. *      Hospital Course:  CHIEF COMPLAINT: Rectal bleeding, diarrhea, fatigue, lightheadedness     History of Present Illness: 56-year-old female patient of Dr. Rubia Soni I am asked to admit and follow. Patient has a recent history of left breast cancer. She had one chemotherapy treatment 11/20/2020. She states within the last few days she was finally able to solid food rather than just liquids. She noticed that she was having black-colored stools and diarrhea. She became more lightheaded and weak with the above. There was no abdominal pain. There was a equivocal \"fall\" onto her chair. Hemoglobin done on 12/7/2020 was 11.3. On admission to the ED 6.7 hemoglobin. Blood pressure on arrival 80/48. --Stool for occult blood was positive in the ED  --Patient was admitted earlier in December 2020 for acute SARS-CoV-2 infection, septicemia and immunocompromised due to breast cancer. --She had a COVID-19 test done recently in PCP office reported to be negative  --She has a history of atrial fibrillation for which she takes apixaban 5 mg twice daily by mouth. --She did have colonoscopy approximately 2010 done at [facility]; negative findings. No recollection of EGD  --Patient had embolic CVA 4/1285 and hospitalized; sinus rhythm on EKG 10/31/2019. --Transesophageal echo done 5/2018 found no evidence of PFO nor vegetations. --Today she states she is feeling much better. Most recent hemoglobin 7.2 after 1 unit packed cell transfusion. 12/31/2020-SUBJECTIVE: Leona Alejandra is alert awake and cooperative; oriented ×3. Denies any chest pain dyspnea nausea emesis. Still having diarrhea. Highest temperature last 24 hours 100.2. Current blood pressure 107/52. Pulse 117.  93% saturation on room air. Intake and output +4132 cc. Hemoglobin this morning 6.8; 2 units packed red cells were ordered. C. difficile toxin a and/or B detected. Enteric bacteria present. Surgical consult from yesterday appreciated; I spoke with surgeon who is planning on EGD today. Oncology consult from today appreciated; maintain hemoglobin greater than 7; MRI brain to evaluate occipital lobe. EGD performed today with chronic gastritis some superficial mild ulcerations to large duodenal ulcers without active bleeding. Recommend proton pump inhibitor, Carafate, bland diet.     1/1/2021patient laying quietly in bed; no chest pain no dyspnea. Still has loose stools. Extensive discussion regarding findings from yesterday, duodenal ulcers, gastric irritation, biopsy for H. pylori pending etc.  She has numerous questions regarding diet all discussed at length. Afebrile last 24 hours. Blood pressure 141/71.  94% saturation on room air. Current pulse 97; has been as high as 146 overnight cardiology was notified. Intake and output +4697 cc. Hemoglobin 10.0 WBC 9.4. Shigella Salmonella etc. all negative. C. difficile was positive. I originally placed patient on Flagyl; pharmacy called because of QTC interval metronidazole was discontinued and oral vancomycin was started.   I spoke with surgeon; his note from today multiple duodenal ulcers definitively the source of GI hemorrhage.       Hospital orders:    RE  CHANGES AND FINDINGS   Medications reviewed with patient GI prophylaxis  DVT prophylaxis  Consultants notes reviewed    Transfuse 2 units packed red blood cells today  Digoxin 125 mcg daily  Digoxin 250 mcg IV given yesterday  Potassium chloride 20 mEq yesterday  Diltiazem 60 mg 1 time  Toprol-XL 25 mg twice daily  Metronidazole 500 mg by mouth 3 times daily  Monitor labs    Instructions at discharge:  Med reconciliation completed  Prescriptions written  Discharge home  Follow-up PCP 1 week  Vancomycin oral solution 125 mg every 6 hours  Digoxin 125 mcg daily  Toprol-XL , resume prehospital doses  Follow-up with cardiology regarding atrial fibrillation  Follow-up with surgery regarding repeat endoscopies  Follow-up with oncology  Hold Eliquis x7 days      Condition at DISCHARGE : Penny Marks8     Discharged to: Home with home health care    Discharge Instructions: Medications reviewed with patient    Consults:cardiology, ID and general surgery     Past Medical Hx :   Past Medical History:   Diagnosis Date    Acute CVA (cerebrovascular accident) [facility] 02/10/2018    SARAN (acute kidney injury) ([facility] 12/03/2020    Atrial fibrillation with RVR ([facility] 12/04/2020    Breast CA ([facility] 12/03/2020    C. difficile diarrhea 01/01/2021    Cancer ([facility] 11/2020    Breast-left and lymph nodes     Chemotherapy adverse reaction 12/03/2020    GI bleed 12/29/2020    History of 2019 novel coronavirus disease (COVID-19) 12/03/2000    Hyperlipidemia     Hypertension     Leukocytosis 12/03/2020    Osteoarthritis     PAF (paroxysmal atrial fibrillation) ([address] 02/26/2018    Septic shock with acute organ dysfunction due to Gram positive cocci ([address] 12/04/2020    Stroke due to embolism of right posterior cerebral artery ([facility] 02/10/2018    TIA (transient ischemic attack) 02/09/2018    Transient cerebral ischemia        Past Surgical Hx :  Past Surgical History:   Procedure Laterality Date    CERVIX SURGERY      COLONOSCOPY  2010    Negative findings  SAUL US PERQ DEVICE SOFT TISSUE PLMT  FIRST LESION  09/30/2020    SAUL US PERQ DEVICE SOFT TISSUE PLMT  FIRST LESION 9/30/2020 [facility]    PORT SURGERY N/A 11/13/2020    POWER PORT INSERTION, RIGHT SUBCLAVIAN performed by Juliano Arteaga MD at [address] [place] [address] N/A 12/06/2020    PORT REMOVAL performed by Juliano Ar[facility]aga MD at [facility]  05/2018    No shunt; no vegetation    UPPER GASTROINTESTINAL ENDOSCOPY N/A 12/31/2020    EGD ESOPHAGOGASTRODUODENOSCOPY ANTRAL BIOPSY performed by Julinao Arteaga MD at [address] LEFT  09/30/2020    US BREAST NEEDLE BIOPSY LEFT 9/30/2020 [facility]    WISDOM TOOTH EXTRACTION         Labs:   CBC:   Lab Results   Component Value Date    WBC 9.4 01/01/2021    RBC 3.35 01/01/2021    HGB 10.0 01/01/2021    HCT 30.6 01/01/2021    MCV 91.3 01/01/2021    MCH 29.9 01/01/2021    MCHC 32.7 01/01/2021    RDW 16.8 01/01/2021     01/01/2021    MPV 11.0 01/01/2021     CBC with Differential:    Lab Results   Component Value Date    WBC 9.4 01/01/2021    RBC 3.35 01/01/2021    HGB 10.0 01/01/2021    HCT 30.6 01/01/2021     01/01/2021    MCV 91.3 01/01/2021    MCH 29.9 01/01/2021    MCHC 32.7 01/01/2021    RDW 16.8 01/01/2021    NRBC 0.0 12/07/2020    METASPCT 3.5 12/07/2020    LYMPHOPCT 13.3 01/01/2021    MONOPCT 8.3 01/01/2021    BASOPCT 0.2 01/01/2021    MONOSABS 0.78 01/01/2021    LYMPHSABS 1.25 01/01/2021    EOSABS 0.19 01/01/2021    BASOSABS 0.02 01/01/2021     Hemoglobin/Hematocrit:    Lab Results   Component Value Date    HGB 10.0 01/01/2021    HCT 30.6 01/01/2021     CMP:    Lab Results   Component Value Date     12/30/2020    K 3.8 12/30/2020     12/30/2020    CO2 22 12/30/2020    BUN 11 12/30/2020    CREATININE 0.8 12/30/2020    GFRAA >60 12/30/2020    LABGLOM >60 12/30/2020    GLUCOSE 128 12/30/2020    PROT 5.9 12/29/2020    LABALBU 3.2 12/29/2020 CALCIUM 8.3 12/30/2020    BILITOT 0.3 12/29/2020    ALKPHOS 128 12/29/2020    AST 27 12/29/2020    ALT 15 12/29/2020     BMP:    Lab Results   Component Value Date     12/30/2020    K 3.8 12/30/2020     12/30/2020    CO2 22 12/30/2020    BUN 11 12/30/2020    LABALBU 3.2 12/29/2020    CREATININE 0.8 12/30/2020    CALCIUM 8.3 12/30/2020    GFRAA >60 12/30/2020    LABGLOM >60 12/30/2020    GLUCOSE 128 12/30/2020     Hepatic Function Panel:    Lab Results   Component Value Date    ALKPHOS 128 12/29/2020    ALT 15 12/29/2020    AST 27 12/29/2020    PROT 5.9 12/29/2020    BILITOT 0.3 12/29/2020    LABALBU 3.2 12/29/2020     Ionized Calcium:  No results found for: IONCA  Magnesium:    Lab Results   Component Value Date    MG 1.9 12/31/2020     Phosphorus:    Lab Results   Component Value Date    PHOS 2.4 12/04/2020     Uric Acid:  No results found for: LABURIC, URICACID  PT/INR:    Lab Results   Component Value Date    PROTIME 17.6 12/29/2020    INR 1.5 12/29/2020     Warfarin PT/INR:  No components found for: PTPATWAR, PTINRWAR  PTT:    Lab Results   Component Value Date    APTT 24.8 12/03/2020   [APTT}  Troponin:    Lab Results   Component Value Date    TROPONINI <0.01 12/29/2020     Last 3 Troponin:    Lab Results   Component Value Date    TROPONINI <0.01 12/29/2020     U/A:    Lab Results   Component Value Date    COLORU DARK YELLOW 12/29/2020    PROTEINU Negative 12/29/2020    PHUR 5.5 12/29/2020    WBCUA 10-20 12/29/2020    RBCUA NONE 12/29/2020    MUCUS Present 12/29/2020    BACTERIA MANY 12/29/2020    CLARITYU CLOUDY 12/29/2020    SPECGRAV 1.025 12/29/2020    LEUKOCYTESUR TRACE 12/29/2020    UROBILINOGEN 0.2 12/29/2020    BILIRUBINUR SMALL 12/29/2020    BLOODU Negative 12/29/2020    GLUCOSEU Negative 12/29/2020    AMORPHOUS PRESENT 12/29/2020     HgBA1c:    Lab Results   Component Value Date    LABA1C 5.5 02/10/2018 Transthoracic Echocardiography Report (TTE)  Demographics   Patient Name   [facility]        Gender            Female                 SONYA TILLEY   Medical Record [id number]       Room Number       3140  Number   Account #      [de-identified]      Procedure Date    12/08/2020   Corporate ID                  Ordering          Angelo Oliver                                Physician   Accession      [id number]     Referring         Rudolph Ray  Number                        Physician   Date of Birth  1955     Sonographer       [facility]   Age            60 year(s)     Interpreting      [address] [place] [address]                                Physician         Physician Cardiology                                                  Zaid Jennings MD                                 Any Other  Procedure Type of Study   TTE procedure:Echo Limited Study. Procedure Date Date: 12/08/2020 Start: 10:25 AM Study Location: Portable Technical Quality: Adequate visualization Indications:Endocarditis. Patient Status: Routine Height: 68 inches Weight: 173 pounds BSA: 1.92 m^2 BMI: 26.3 kg/m^2 HR: 94 bpm  Findings   Left Ventricle  Normal left ventricular chamber size. Normal left ventricular systolic function. Visually estimated LVEF is 60-65 %. No wall motion abnormalities. Right Ventricle  Normal right ventricle structure and function. Left Atrium  Normal left atrial size. Right Atrium  Normal right atrium size. Mitral Valve  Normal mitral valve structure and function. No mitral valve regurgitation. Tricuspid Valve  Normal tricuspid valve structure and function. Trace tricuspid valve regurgitation. Aortic Valve  Aortic valve sclerosis. Mild aortic regurgitation. Pulmonic Valve  Normal pulmonic valve structure and function. Pericardial Effusion  No pericardial effusion. Conclusions   Summary  Tachycardia. No intracardiac vegetation. Normal left ventricular chamber size.   Normal left ventricular systolic function. Visually estimated LVEF is 60-65 %. No wall motion abnormalities. Normal right ventricle structure and function. Aortic valve sclerosis. Mild aortic regurgitation. Signature   ----------------------------------------------------------------  Electronically signed by Yanet Buchanan MD(Interpreting  physician) on 12/08/2020 12:57 PM  ----------------------------------------------------------------  M-Mode/2D Measurements & Calculations   LV Diastolic      LV Systolic Dimension: 3.1 cm     LA Dimension: 3 cm  Dimension: 4.3 cm LV Volume Diastolic: 93.2 ml  LV FP:28.8 %      LV Volume Systolic: 37 ml  LV PW Diastolic:  LV EDV/LV EDV Index: 83.7 ml/44  0.8 cm            ml/m^2LV ESV/LV ESV Index: 37     RV Diastolic  LV PW Systolic:   UY/10YB/ m^2                      Dimension: 3.1 cm  1.3 cm            EF Calculated: 55.8 %  Septum Diastolic: LV Mass Index: 55 l/min*m^2  0.8 cm            LV Length: 6.7 cm                 LA volume/Index: 56 ml  Septum Systolic:                                    /28.95ml/m^2  1. 1 cm                                              RA Area: 13 cm^2   LV Mass: 105.33 g                                   IVC Expiration: 1.3 cm  Doppler Measurements & Calculations                                           Estimated RVSP: 25.9 mmHg                                          Estimated RAP:3 mmHg    Estimated PASP: 25.94 mmHg             TR Velocity:2.4 m/s                                          TR Gradient:22.94 mmHg  [email or web address] [id number]    Xr Chest (2 Vw)    Result Date: 12/29/2020 EXAMINATION: TWO XRAY VIEWS OF THE CHEST 12/29/2020 3:37 pm COMPARISON: 12/03/2020 HISTORY: ORDERING SYSTEM PROVIDED HISTORY: hypotension TECHNOLOGIST PROVIDED HISTORY: Reason for exam:->hypotension FINDINGS: There is a patchy infiltrate seen within the right lung base, left perihilar region and left lower lobe. The heart is not enlarged. There is no pleural effusion. Patchy multifocal bilateral pulmonary infiltrates.     Ct Head Wo Contrast    Result Date: 12/29/2020 1. Area of low attenuation within the right occipital lobe measuring 1.3 x 1.6 x 2.0 cm likely representing an old occipital lobe infarct. Metastatic disease cannot be completely excluded given the patient's history of breast CA. This finding was not present on the patient's prior study of 02/09/2018. This finding should be further evaluated with either an MRI of the brain with and without contrast or CT scan of the brain with IV contrast to evaluate for any peripheral enhancement. Mri Brain W Wo Contrast    Result Date: 12/31/2020  EXAMINATION: MRI OF THE BRAIN WITHOUT AND WITH CONTRAST  12/31/2020 12:34 pm TECHNIQUE: Multiplanar multisequence MRI of the head/brain was performed without and with the administration of intravenous contrast. COMPARISON: February 10, 2018 HISTORY: ORDERING SYSTEM PROVIDED HISTORY: r/o metastasis TECHNOLOGIST PROVIDED HISTORY: Reason for exam:->r/o metastasis FINDINGS: 14 mL MultiHance utilized. No abnormal areas of brain parenchymal enhancement or leptomeningeal enhance mint. There are no areas of restricted diffusion to suggest acute intracranial ischemia. Area of encephalomalacia is located in right occipital cortex and subcortical white matter. There is prominence of perivascular spaces at level of right and left basal ganglia and anterior commissure. There is a moderate burden of nonspecific, subcentimeter and confluent areas of increased T2 and FLAIR signal throughout periventricular and subcortical white matter. This finding appears similar compared to the previous examination. There is no intracranial hemorrhage. No abnormal extra-axial fluid collections. No hydrocephalus. Mild mucosal thickening is associated with bilateral maxillary sinuses. Fluid signal associated with right mastoid air cells.

## 2021-01-04 NOTE — TELEPHONE ENCOUNTER
Patient notified of Dr. Tia Ko recommendation. Patient put on list to call when March 2021 schedule opens.

## 2021-01-11 ENCOUNTER — HOSPITAL ENCOUNTER (OUTPATIENT)
Age: 66
Discharge: HOME | End: 2021-01-13
Payer: COMMERCIAL

## 2021-01-11 ENCOUNTER — PREP FOR PROCEDURE (OUTPATIENT)
Dept: SURGERY | Age: 66
End: 2021-01-11

## 2021-01-11 DIAGNOSIS — Z01.818 PREOP TESTING: ICD-10-CM

## 2021-01-11 PROCEDURE — U0003 INFECTIOUS AGENT DETECTION BY NUCLEIC ACID (DNA OR RNA); SEVERE ACUTE RESPIRATORY SYNDROME CORONAVIRUS 2 (SARS-COV-2) (CORONAVIRUS DISEASE [COVID-19]), AMPLIFIED PROBE TECHNIQUE, MAKING USE OF HIGH THROUGHPUT TECHNOLOGIES AS DESCRIBED BY CMS-2020-01-R: HCPCS

## 2021-01-11 RX ORDER — SODIUM CHLORIDE 0.9 % (FLUSH) 0.9 %
10 SYRINGE (ML) INJECTION EVERY 12 HOURS SCHEDULED
Status: CANCELLED | OUTPATIENT
Start: 2021-01-11

## 2021-01-11 RX ORDER — SODIUM CHLORIDE, SODIUM LACTATE, POTASSIUM CHLORIDE, CALCIUM CHLORIDE 600; 310; 30; 20 MG/100ML; MG/100ML; MG/100ML; MG/100ML
INJECTION, SOLUTION INTRAVENOUS CONTINUOUS
Status: CANCELLED | OUTPATIENT
Start: 2021-01-11

## 2021-01-12 LAB
SARS-COV-2: NOT DETECTED
SOURCE: NORMAL

## 2021-01-12 NOTE — PROGRESS NOTES
[facility] PRE-ADMISSION TESTING INSTRUCTIONS    The Preadmission Testing patient is instructed accordingly using the following criteria (check applicable):    ARRIVAL INSTRUCTIONS:  [x] Parking the day of Surgery is located in the Main Entrance lot. Upon entering the door, make an immediate right to the surgery reception desk    [x] Bring photo ID and insurance card    [] Bring in a copy of Living will or Durable Power of  papers. [x] Please be sure to arrange transportation to and from the hospital    [x] Please arrange for someone to be with you the remainder of the day due to having anesthesia      GENERAL INSTRUCTIONS:    [x] Nothing by mouth after midnight, including gum, candy, mints or water    [x] You may brush your teeth, but do not swallow any water    [x] Take medications as instructed with 1-2 oz of water    [x] Stop herbal supplements and vitamins 5 days prior to procedure    [x] Follow preop dosing of blood thinners per physician instructions    [] Do not take insulin or oral diabetic medications    [] If diabetic and have low blood sugar or feel symptomatic, take 1-2oz apple juice or glucose tablets    [] Bring inhalers day of surgery    [] Bring C-PAP/ Bi-Pap day of surgery    [] Bring urine specimen day of surgery    [x] Antibacterial Soap shower or bath AM of Surgery, no lotion, powders or creams to surgical site    [] Follow bowel prep as instructed per surgeon    [x] No tobacco products within 24 hours of surgery     [x] No alcohol or illegal drug use within 24 hours of surgery.     [x] Jewelry, body piercing's, eyeglasses, contact lenses and dentures are not permitted into surgery (bring cases)      [x] Please do not wear any nail polish or make up on the day of surgery    [x] If not already done, you can expect a call from registration    [x] If surgeon requests a time change you will be notified the day prior to surgery [] If you receive a survey after surgery we would greatly appreciate your comments    [] Parent/guardian of a minor must accompany their child and remain on the premises  the entire time they are under our care     [] Pediatric patients may bring favorite toy, blanket or comfort item with them    [] A caregiver or family member must remain with the patient during their stay if they are mentally handicapped, have dementia, disoriented or unable to use a call light or would be a safety concern if left unattended    [x] Please notify surgeon if you develop any illness between now and time of surgery (cold, cough, sore throat, fever, nausea, vomiting) or any signs of infections  including skin, wounds, and dental.    [] Other instructions    EDUCATIONAL MATERIALS PROVIDED:    [] PAT Preoperative Education Packet/Booklet     [] Medication List    [] Fluoroscopy Information Pamphlet    [] Transfusion bracelet applied with instructions    [] Joint replacement video reviewed    [] Shower with antibacterial soap and use CHG wipes provided the evening before surgery as instructed

## 2021-01-13 ENCOUNTER — TELEPHONE (OUTPATIENT)
Dept: ADMINISTRATIVE | Age: 66
End: 2021-01-13

## 2021-01-13 NOTE — TELEPHONE ENCOUNTER
Spoke with patient. BMP and Magnesium requested by Dr. Harjit Mari were done while she is recently inpatient. Please review and advise.

## 2021-01-13 NOTE — TELEPHONE ENCOUNTER
Pt was recently in hospital and received a letter to schedule for labs, she doesn't understand the letter, please advise her at [phone] home, or cell [phone], she also said Delmer EL name is on the letter

## 2021-01-14 ENCOUNTER — ANESTHESIA EVENT (OUTPATIENT)
Dept: OPERATING ROOM | Age: 66
End: 2021-01-14
Payer: COMMERCIAL

## 2021-01-14 NOTE — ANESTHESIA PRE PROCEDURE
Department of Anesthesiology  Preprocedure Note       Name:  Annie Smith   Age:  60 y.o.  :  1955                                          MRN:  [id number]         Date:  2021      Surgeon: uYmiko Castillo):  Jez Ruiz MD    Procedure: Procedure(s):  POWER PORT INSERTION    Medications prior to admission:   Prior to Admission medications    Medication Sig Start Date End Date Taking? Authorizing Provider   sucralfate (CARAFATE) 1 GM tablet Take 1 tablet by mouth 4 times daily 21  Yes Dirk Do,    pantoprazole (PROTONIX) 40 MG tablet Take 1 tablet by mouth every morning (before breakfast) 21  Yes Luis M Do DO   vancomycin (VANCOCIN) 50 mg/mL oral solution Take 2.5 mLs by mouth every 6 hours 21  Yes Dirk Do DO   lisinopril (PRINIVIL;ZESTRIL) 20 MG tablet Take 20 mg by mouth 2 times daily   Yes Historical Provider, MD   digoxin (LANOXIN) 125 MCG tablet Take 1 tablet by mouth daily  Patient taking differently: Take 125 mcg by mouth Daily with lunch  20  Yes Celso Barrientos MD   ondansetron (ZOFRAN) 8 MG tablet Take 8 mg by mouth every 8 hours as needed for Nausea or Vomiting States she has not needed to take recently.    Yes Historical Provider, MD   metoprolol succinate (TOPROL XL) 50 MG extended release tablet TAKE 1 TABLET BY MOUTH TWICE A DAY  Patient taking differently: Take 50 mg by mouth 2 times daily Takes along with a 100mg tablet for total of 150mg bid 20  Yes Danis Eaton DO   metoprolol succinate (TOPROL XL) 100 MG extended release tablet Take 1 tablet by mouth 2 times daily  Patient taking differently: Take 100 mg by mouth 2 times daily Takes along with a 50mg for total of 150mg BID 20  Yes Danis Eaton DO   apixaban (ELIQUIS) 5 MG TABS tablet Take 5 mg by mouth 2 times daily    Yes Historical Provider, MD   atorvastatin (LIPITOR) 40 MG tablet Take 1 tablet by mouth nightly 18  Yes Brandan Quiñones MD   calcium carbonate (OSCAL) 500 (7) days. (Patient not taking: Reported on 1/12/2021) 17 tablet 0       Allergies:     Allergies   Allergen Reactions    Adhesive Tape Rash       Problem List:    Patient Active Problem List   Diagnosis Code    TIA (transient ischemic attack) G45.9    Acute CVA (cerebrovascular accident) ([address] I63.9    Transient cerebral ischemia G45.9    PAF (paroxysmal atrial fibrillation) ([facility]) I48.0    Breast CA ([address] C50.919    SARAN (acute kidney injury) ([address] N17.9    Chemotherapy adverse reaction T45.1X5A    Leukocytosis D72.829    Septic shock with acute organ dysfunction due to Gram positive cocci ([facility]) A41.89, R65.21    COVID-19 U07.1    Atrial fibrillation with RVR ([facility]) I48.91    GI bleed K92.2    History of 2019 novel coronavirus disease (COVID-19) Z86.16    C. difficile diarrhea A04.72    Hypertension I10    Stroke due to embolism of right posterior cerebral artery ([facility]) I63.431       Past Medical History:        Diagnosis Date    Acute CVA (cerebrovascular accident) ([address] 02/10/2018    SARAN (acute kidney injury) ([address] 12/03/2020    Atrial fibrillation with RVR ([address] 12/04/2020    Breast CA ([address] 12/03/2020    C. difficile diarrhea 01/01/2021    Cancer ([address] 11/2020    Breast-left and lymph nodes     Chemotherapy adverse reaction 12/03/2020    GI bleed 12/29/2020    History of 2019 novel coronavirus disease (COVID-19) 12/03/2000    Hyperlipidemia     Hypertension     Leukocytosis 12/03/2020    Osteoarthritis     PAF (paroxysmal atrial fibrillation) ([address] 02/26/2018    Septic shock with acute organ dysfunction due to Gram positive cocci ([address] 12/04/2020    Stroke due to embolism of right posterior cerebral artery ([address] 02/10/2018    TIA (transient ischemic attack) 02/09/2018    Transient cerebral ischemia        Past Surgical History:        Procedure Laterality Date    CERVIX SURGERY      COLONOSCOPY  2010    Negative findings    SAUL US PERQ DEVICE SOFT TISSUE PLMT  FIRST LESION 09/30/2020    [facility] US PERQ DEVICE SOFT TISSUE PLMT  FIRST LESION 9/30/2020 [facility]    PORT SURGERY N/A 11/13/2020    POWER PORT INSERTION, RIGHT SUBCLAVIAN performed by Tigist Henning MD at [address] [place] [address] N/A 12/06/2020    PORT REMOVAL performed by Tigist Henning MD at [facility] ECHOCARDIOGRAM  05/2018    No shunt; no vegetation    UPPER GASTROINTESTINAL ENDOSCOPY N/A 12/31/2020    EGD ESOPHAGOGASTRODUODENOSCOPY ANTRAL BIOPSY performed by Tigist Henning MD at [place] [address] LEFT  09/30/2020    US BREAST NEEDLE BIOPSY LEFT 9/30/2020 [facility]    WISDOM TOOTH EXTRACTION         Social History:    Social History     Tobacco Use    Smoking status: Never Smoker    Smokeless tobacco: Never Used   Substance Use Topics    Alcohol use: Not Currently     Comment: rare                                Counseling given: Not Answered      Vital Signs (Current):   Vitals:    01/12/21 1217   Weight: 140 lb (63.5 kg)   Height: 5' 8\" (1.727 m)                                              BP Readings from Last 3 Encounters:   01/01/21 (!) 149/71   12/31/20 (!) 140/66   12/08/20 133/80       NPO Status:                                                                                 BMI:   Wt Readings from Last 3 Encounters:   12/31/20 158 lb 8.2 oz (71.9 kg)   12/03/20 173 lb (78.5 kg)   11/13/20 173 lb (78.5 kg)     Body mass index is 21.29 kg/m².     CBC:   Lab Results   Component Value Date    WBC 9.4 01/01/2021    RBC 3.35 01/01/2021    HGB 10.0 01/01/2021    HCT 30.6 01/01/2021    MCV 91.3 01/01/2021    RDW 16.8 01/01/2021     01/01/2021       CMP:   Lab Results   Component Value Date     12/30/2020    K 3.8 12/30/2020     12/30/2020    CO2 22 12/30/2020    BUN 11 12/30/2020    CREATININE 0.8 12/30/2020    GFRAA >60 12/30/2020    LABGLOM >60 12/30/2020    GLUCOSE 128 12/30/2020    PROT 5.9 12/29/2020    CALCIUM 8.3 12/30/2020 BILITOT 0.3 12/29/2020    ALKPHOS 128 12/29/2020    AST 27 12/29/2020    ALT 15 12/29/2020       POC Tests: No results for input(s): POCGLU, POCNA, POCK, POCCL, POCBUN, POCHEMO, POCHCT in the last 72 hours. Coags:   Lab Results   Component Value Date    PROTIME 17.6 12/29/2020    INR 1.5 12/29/2020    APTT 24.8 12/03/2020       HCG (If Applicable): No results found for: PREGTESTUR, PREGSERUM, HCG, HCGQUANT     ABGs: No results found for: PHART, PO2ART, VKM5JGE, KLO1WPQ, BEART, H0FDFBFU     Type & Screen (If Applicable):  No results found for: LABABO, LABRH    Drug/Infectious Status (If Applicable):  No results found for: HIV, HEPCAB    COVID-19 Screening (If Applicable):   Lab Results   Component Value Date    COVID19 Not Detected 01/11/2021    COVID19 Not Detected 12/30/2020         Anesthesia Evaluation    Airway: Mallampati: II  TM distance: >3 FB   Neck ROM: full  Mouth opening: > = 3 FB Dental:      Comment: Intact, missing left upper molar. Pulmonary:Negative Pulmonary ROS and normal exam  breath sounds clear to auscultation                             Cardiovascular:    (+) hypertension: moderate, murmur (Grade 2/6 murmur), hyperlipidemia        Rhythm: irregular  Rate: normal                    Neuro/Psych:   (+) CVA ((Stroke due to embolism of right posterior cerebral artery )Patient had peripheral vision loss which essentially returned after receiving TPA):, TIA,             GI/Hepatic/Renal:   (+) GERD:,           Endo/Other:                     Abdominal:           Vascular:                                      Anesthesia Plan      MAC     ASA 3       Induction: intravenous. Anesthetic plan and risks discussed with patient. Plan discussed with CRNA.                 Elizabet Lemus MD   1/14/2021

## 2021-01-15 ENCOUNTER — APPOINTMENT (OUTPATIENT)
Dept: GENERAL RADIOLOGY | Age: 66
End: 2021-01-15
Attending: SURGERY
Payer: COMMERCIAL

## 2021-01-15 ENCOUNTER — HOSPITAL ENCOUNTER (OUTPATIENT)
Age: 66
Setting detail: OUTPATIENT SURGERY
Discharge: HOME | End: 2021-01-15
Attending: SURGERY | Admitting: SURGERY
Payer: COMMERCIAL

## 2021-01-15 ENCOUNTER — ANESTHESIA (OUTPATIENT)
Dept: OPERATING ROOM | Age: 66
End: 2021-01-15
Payer: COMMERCIAL

## 2021-01-15 VITALS
DIASTOLIC BLOOD PRESSURE: 78 MMHG | HEART RATE: 86 BPM | WEIGHT: 140 LBS | RESPIRATION RATE: 16 BRPM | OXYGEN SATURATION: 98 % | SYSTOLIC BLOOD PRESSURE: 121 MMHG | TEMPERATURE: 97 F | HEIGHT: 68 IN | BODY MASS INDEX: 21.22 KG/M2

## 2021-01-15 VITALS — DIASTOLIC BLOOD PRESSURE: 52 MMHG | SYSTOLIC BLOOD PRESSURE: 95 MMHG | OXYGEN SATURATION: 95 %

## 2021-01-15 DIAGNOSIS — Z01.818 PREOP TESTING: Primary | ICD-10-CM

## 2021-01-15 PROCEDURE — C1788 PORT, INDWELLING, IMP: HCPCS | Performed by: SURGERY

## 2021-01-15 PROCEDURE — 2580000003 HC RX 258: Performed by: SURGERY

## 2021-01-15 PROCEDURE — 71045 X-RAY EXAM CHEST 1 VIEW: CPT

## 2021-01-15 PROCEDURE — 3600000002 HC SURGERY LEVEL 2 BASE: Performed by: SURGERY

## 2021-01-15 PROCEDURE — 7100000010 HC PHASE II RECOVERY - FIRST 15 MIN: Performed by: SURGERY

## 2021-01-15 PROCEDURE — 6360000002 HC RX W HCPCS: Performed by: SURGERY

## 2021-01-15 PROCEDURE — 7100000011 HC PHASE II RECOVERY - ADDTL 15 MIN: Performed by: SURGERY

## 2021-01-15 PROCEDURE — 2709999900 HC NON-CHARGEABLE SUPPLY: Performed by: SURGERY

## 2021-01-15 PROCEDURE — 3700000001 HC ADD 15 MINUTES (ANESTHESIA): Performed by: SURGERY

## 2021-01-15 PROCEDURE — 2580000003 HC RX 258

## 2021-01-15 PROCEDURE — 6360000002 HC RX W HCPCS

## 2021-01-15 PROCEDURE — 3600000012 HC SURGERY LEVEL 2 ADDTL 15MIN: Performed by: SURGERY

## 2021-01-15 PROCEDURE — 2500000003 HC RX 250 WO HCPCS: Performed by: SURGERY

## 2021-01-15 PROCEDURE — 3700000000 HC ANESTHESIA ATTENDED CARE: Performed by: SURGERY

## 2021-01-15 PROCEDURE — 3209999900 FLUORO FOR SURGICAL PROCEDURES

## 2021-01-15 DEVICE — PORT IMPL INFUSION 8 FRX66 CM CATH SMARTPORT: Type: IMPLANTABLE DEVICE | Site: CHEST | Status: FUNCTIONAL

## 2021-01-15 RX ORDER — FENTANYL CITRATE 50 UG/ML
INJECTION, SOLUTION INTRAMUSCULAR; INTRAVENOUS PRN
Status: DISCONTINUED | OUTPATIENT
Start: 2021-01-15 | End: 2021-01-15 | Stop reason: SDUPTHER

## 2021-01-15 RX ORDER — SODIUM CHLORIDE, SODIUM LACTATE, POTASSIUM CHLORIDE, CALCIUM CHLORIDE 600; 310; 30; 20 MG/100ML; MG/100ML; MG/100ML; MG/100ML
INJECTION, SOLUTION INTRAVENOUS CONTINUOUS
Status: DISCONTINUED | OUTPATIENT
Start: 2021-01-15 | End: 2021-01-15 | Stop reason: HOSPADM

## 2021-01-15 RX ORDER — SODIUM CHLORIDE 9 MG/ML
INJECTION INTRAVENOUS PRN
Status: DISCONTINUED | OUTPATIENT
Start: 2021-01-15 | End: 2021-01-15 | Stop reason: ALTCHOICE

## 2021-01-15 RX ORDER — PROPOFOL 10 MG/ML
INJECTION, EMULSION INTRAVENOUS PRN
Status: DISCONTINUED | OUTPATIENT
Start: 2021-01-15 | End: 2021-01-15 | Stop reason: SDUPTHER

## 2021-01-15 RX ORDER — ONDANSETRON 2 MG/ML
INJECTION INTRAMUSCULAR; INTRAVENOUS PRN
Status: DISCONTINUED | OUTPATIENT
Start: 2021-01-15 | End: 2021-01-15 | Stop reason: SDUPTHER

## 2021-01-15 RX ORDER — OXYCODONE HYDROCHLORIDE 5 MG/1
5 TABLET ORAL EVERY 6 HOURS PRN
Qty: 12 TABLET | Refills: 0 | Status: SHIPPED | OUTPATIENT
Start: 2021-01-15 | End: 2021-01-18

## 2021-01-15 RX ORDER — MIDAZOLAM HYDROCHLORIDE 1 MG/ML
INJECTION INTRAMUSCULAR; INTRAVENOUS PRN
Status: DISCONTINUED | OUTPATIENT
Start: 2021-01-15 | End: 2021-01-15 | Stop reason: SDUPTHER

## 2021-01-15 RX ORDER — SODIUM CHLORIDE 9 MG/ML
INJECTION, SOLUTION INTRAVENOUS CONTINUOUS PRN
Status: DISCONTINUED | OUTPATIENT
Start: 2021-01-15 | End: 2021-01-15 | Stop reason: SDUPTHER

## 2021-01-15 RX ORDER — AMOXICILLIN 250 MG
2 CAPSULE ORAL DAILY
Qty: 28 TABLET | Refills: 0 | Status: SHIPPED | OUTPATIENT
Start: 2021-01-15 | End: 2021-01-29

## 2021-01-15 RX ORDER — SODIUM CHLORIDE 0.9 % (FLUSH) 0.9 %
10 SYRINGE (ML) INJECTION EVERY 12 HOURS SCHEDULED
Status: DISCONTINUED | OUTPATIENT
Start: 2021-01-15 | End: 2021-01-15 | Stop reason: HOSPADM

## 2021-01-15 RX ORDER — PROPOFOL 10 MG/ML
INJECTION, EMULSION INTRAVENOUS CONTINUOUS PRN
Status: DISCONTINUED | OUTPATIENT
Start: 2021-01-15 | End: 2021-01-15 | Stop reason: SDUPTHER

## 2021-01-15 RX ORDER — LIDOCAINE HYDROCHLORIDE 10 MG/ML
INJECTION, SOLUTION INFILTRATION; PERINEURAL PRN
Status: DISCONTINUED | OUTPATIENT
Start: 2021-01-15 | End: 2021-01-15 | Stop reason: ALTCHOICE

## 2021-01-15 RX ORDER — HEPARIN SODIUM (PORCINE) LOCK FLUSH IV SOLN 100 UNIT/ML 100 UNIT/ML
SOLUTION INTRAVENOUS PRN
Status: DISCONTINUED | OUTPATIENT
Start: 2021-01-15 | End: 2021-01-15 | Stop reason: ALTCHOICE

## 2021-01-15 RX ADMIN — PROPOFOL 30 MG: 10 INJECTION, EMULSION INTRAVENOUS at 07:13

## 2021-01-15 RX ADMIN — MIDAZOLAM 2 MG: 1 INJECTION INTRAMUSCULAR; INTRAVENOUS at 07:11

## 2021-01-15 RX ADMIN — FENTANYL CITRATE 25 MCG: 50 INJECTION, SOLUTION INTRAMUSCULAR; INTRAVENOUS at 07:23

## 2021-01-15 RX ADMIN — PROPOFOL 150 MCG/KG/MIN: 10 INJECTION, EMULSION INTRAVENOUS at 07:11

## 2021-01-15 RX ADMIN — ONDANSETRON 4 MG: 2 INJECTION INTRAMUSCULAR; INTRAVENOUS at 07:49

## 2021-01-15 RX ADMIN — Medication 2 G: at 07:15

## 2021-01-15 RX ADMIN — SODIUM CHLORIDE, POTASSIUM CHLORIDE, SODIUM LACTATE AND CALCIUM CHLORIDE: 600; 310; 30; 20 INJECTION, SOLUTION INTRAVENOUS at 06:50

## 2021-01-15 RX ADMIN — FENTANYL CITRATE 50 MCG: 50 INJECTION, SOLUTION INTRAMUSCULAR; INTRAVENOUS at 07:51

## 2021-01-15 RX ADMIN — SODIUM CHLORIDE: 9 INJECTION, SOLUTION INTRAVENOUS at 07:00

## 2021-01-15 ASSESSMENT — PULMONARY FUNCTION TESTS
PIF_VALUE: 1

## 2021-01-15 ASSESSMENT — PAIN - FUNCTIONAL ASSESSMENT: PAIN_FUNCTIONAL_ASSESSMENT: 0-10

## 2021-01-15 NOTE — H&P
Update History & Physical    The patient's History and Physical of January 11, was reviewed with the patient and I examined the patient. There was no change. The surgical site was confirmed by the patient and me. Plan: The risks, benefits, expected outcome, and alternative to the recommended procedure have been discussed with the patient. Patient understands and wants to proceed with the procedure. Electronically signed by Ezio Albarran MD on 1/15/2021 at 7:00 AM          CC:  Routine postprocedure visit     HPI: Vicki Bales, the patient was diagnosed with metastatic left breast cancer. MRI revealed the area within the axilla and within the breast.  Patient has no other disease noted. ]     Meds Prior to Visit:  Atorvastatin Calcium     Calcium Carb-Cholecalciferol     Eliquis     Lisinopril     Metoprolol Succinate ER         Allergies:  NKDA                 Exam:     On examination: No significant change has been noted in her examination. Assessment #1: Hx C50.412 Malignant neoplasm of upper-outer quadrant of left female breast   Care Plan:              Comments       : The patient will be seen by Dr. Daniel Matute for appropriate primary chemotherapy. The patient may need a port placement.     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

## 2021-01-15 NOTE — OP NOTE
Operative Note      Patient: Erinn Milligan  YOB: 1955  MRN: [id number]    Date of Procedure: 1/15/2021    Pre-Op Diagnosis: BREAST CANCER    Post-Op Diagnosis: Same       Procedure(s):  POWER PORT INSERTION    Surgeon(s):  Fide Olea MD    Assistant:   Resident: Bernarda Martinez MD    Anesthesia: Monitor Anesthesia Care    Estimated Blood Loss (mL): Minimal    Complications: None    Specimens:   * No specimens in log *    Implants:  Implant Name Type Inv. Item Serial No.  Lot No. LRB No. Used Action   PORT IMPL INFUSION 8 [id number] CM CATH SMARTPORT  PORT IMPL INFUSION 8 [id number] CM CATH SMARTPORT  [employer] [id number] Right 1 Implanted         Drains: * No LDAs found *    Findings: right subclavian port insertion    Indications:  Patient with L breast cancer requiring chemotherapy for treatment. Presents for port placement. The risks and benefits of surgical intervention were discussed with the patient and the patient agreed to proceed. Detailed Description of Procedure: The patient was brought to the operating room and positioned supine on the operating room table. A shoulder roll was placed to extend the neck. Sequential compression devices were placed on the patient's lower extremities and were powered on. MAC was administered and preoperative antibiotics were administered per the anesthetic record. The patient was prepped and draped in the usual sterile fashion and the procedure went forth with strict aseptic technique under maximal barrier precautions. Immediately prior to the procedure a time-out was called and the surgical checklist was reviewed and agreed upon by all present. Local anesthetic was injected into the skin and subcutaneous tissues were the finder needle was to be inserted. The patient was placed in Trendelenburg. Using anatomical landmarks, the finder needle was inserted and the R subclavian vein was cannulated. Confirmed with venous flow. The guidewire was inserted through the needle and position was confirmed with ectopy and by fluoroscopy. The needle was taken off the wire and its position was maintained with a hemostat. Attention was turned to creating our port pocket. Using a 15-scalpel blade, an incision approximately 3cm in size was made transversely inferior to the where the guidewire exiting the body. The incision was continued deeply with electrocautery. A subcutaneous flap was made inferiorly and the port was placed inside the pocket to confirm adequate sizing. The introducer was directed over the guidewire and as it entered the chest we watched it with fluoroscopy. Once completely in, the guidewire and inner cannula were removed. The catheter was fed into the introducer and fluoroscopy was used to confirm adequate positioning in the SVC. The patient was leveled out and a tonsil was used to tunnel the catheter to the port pocket. The port was attached and a 3-0 vicryl suture was used to secure port and catheter at the superior aspect. Using a chua needle, the port joselyn and flushed without difficulty. Attention was turned to closing the skin. 4-0 vicryl was used in an interrupted simple fashion to approximate the skin. Dermabond applied. Counts were correct x2. The patient was brought to PACU where she will receive a CXR to confirm positioning of the port and to assess for complications. She will be discharged after the CXR is interpreted.     Electronically signed by Melina Toussaint MD on 1/15/2021 at 8:02 AM

## 2021-01-15 NOTE — ANESTHESIA POSTPROCEDURE EVALUATION
Department of Anesthesiology  Postprocedure Note    Patient: Annie Smith  MRN: [id number]  YOB: 1955  Date of evaluation: 1/15/2021  Time:  1:50 PM     Procedure Summary     Date: 01/15/21 Room / Location: [facility] OR 10 / [facility]    Anesthesia Start: 0719 Anesthesia Stop: 0805    Procedure: POWER PORT INSERTION (N/A Chest) Diagnosis: (BREAST CANCER)    Surgeons: Jez Ruiz MD Responsible Provider: Travis Gaines MD    Anesthesia Type: MAC ASA Status: 3          Anesthesia Type: MAC    Esteban Phase I: Esteban Score: 10    Esteban Phase II: Esteban Score: 10    Last vitals: Reviewed and per EMR flowsheets.        Anesthesia Post Evaluation    Patient location during evaluation: bedside  Level of consciousness: awake and alert  Pain score: 1  Airway patency: patent  Nausea & Vomiting: no nausea and no vomiting  Complications: no  Cardiovascular status: hemodynamically stable  Respiratory status: acceptable  Hydration status: euvolemic

## 2021-01-15 NOTE — BRIEF OP NOTE
Brief Postoperative Note      Patient: Loyda Julien  YOB: 1955  MRN: [id number]    Date of Procedure: 1/15/2021    Pre-Op Diagnosis: BREAST CANCER    Post-Op Diagnosis: Same       Procedure(s):  POWER PORT INSERTION    Surgeon(s):  Azam Rubi MD    Assistant:  Resident: Sravanthi Hong MD    Anesthesia: Monitor Anesthesia Care    Estimated Blood Loss (mL): Minimal    Complications: None    Specimens:   * No specimens in log *    Implants:  Implant Name Type Inv.  Item Serial No.  Lot No. LRB No. Used Action   PORT IMPL INFUSION 8 [id number] CM CATH SMARTPORT  PORT IMPL INFUSION 8 [id number] CM CATH SMARTPORT  [employer] [id number] Right 1 Implanted         Drains: * No LDAs found *    Findings: right port insertion subclavian access    Electronically signed by Jewel Dallas MD on 1/15/2021 at 7:58 AM

## 2021-02-10 ENCOUNTER — TELEPHONE (OUTPATIENT)
Dept: CARDIOLOGY CLINIC | Age: 66
End: 2021-02-10

## 2021-02-12 RX ORDER — METOPROLOL SUCCINATE 100 MG/1
100 TABLET, EXTENDED RELEASE ORAL 2 TIMES DAILY
Qty: 60 TABLET | Refills: 11 | Status: SHIPPED
Start: 2021-02-12 | End: 2022-02-16

## 2021-03-18 ENCOUNTER — OFFICE VISIT (OUTPATIENT)
Dept: CARDIOLOGY CLINIC | Age: 66
End: 2021-03-18
Payer: COMMERCIAL

## 2021-03-18 VITALS
SYSTOLIC BLOOD PRESSURE: 110 MMHG | HEART RATE: 68 BPM | HEIGHT: 68 IN | DIASTOLIC BLOOD PRESSURE: 70 MMHG | WEIGHT: 150.8 LBS | RESPIRATION RATE: 14 BRPM | BODY MASS INDEX: 22.85 KG/M2

## 2021-03-18 DIAGNOSIS — I48.91 ATRIAL FIBRILLATION WITH RVR (HCC): Primary | ICD-10-CM

## 2021-03-18 DIAGNOSIS — I48.91 ATRIAL FIBRILLATION WITH RVR (HCC): ICD-10-CM

## 2021-03-18 LAB — TSH SERPL DL<=0.05 MIU/L-ACNC: 0.41 UIU/ML (ref 0.27–4.2)

## 2021-03-18 PROCEDURE — 99215 OFFICE O/P EST HI 40 MIN: CPT | Performed by: INTERNAL MEDICINE

## 2021-03-18 PROCEDURE — 93000 ELECTROCARDIOGRAM COMPLETE: CPT | Performed by: INTERNAL MEDICINE

## 2021-03-18 RX ORDER — AMOXICILLIN 250 MG
1 CAPSULE ORAL DAILY
COMMUNITY
End: 2022-02-01

## 2021-03-18 NOTE — PROGRESS NOTES
Pt was in for lab work to monitor medications. Labs were drawn from left arm, using a 23G x 3/4\" butterfly needle. Patient tolerated procedure well.     Surinder Espino, STEVEN

## 2021-03-18 NOTE — PROGRESS NOTES
Geoff Kulkarni  1955  Date of Service: 3/18/2021    Patient Active Problem List    Diagnosis Date Noted    C. difficile diarrhea 01/01/2021    Hypertension     Stroke due to embolism of right posterior cerebral artery ([address]      Overview Note:     2/10/2018      History of 2019 novel coronavirus disease (COVID-19) 12/30/2020     Overview Note:     12/3/2020      GI bleed 12/29/2020    Septic shock with acute organ dysfunction due to Gram positive cocci ([address] 12/04/2020    COVID-19 12/04/2020    Atrial fibrillation with RVR ([address] 12/04/2020    Breast CA ([address] 12/03/2020    SARAN (acute kidney injury) ([address] 12/03/2020    Chemotherapy adverse reaction 12/03/2020    Leukocytosis 12/03/2020    PAF (paroxysmal atrial fibrillation) ([address] 02/26/2018    Acute CVA (cerebrovascular accident) ([address] 02/10/2018     Overview Note:     2/10/2018      Transient cerebral ischemia     TIA (transient ischemic attack) 02/09/2018       Social History     Socioeconomic History    Marital status:      Spouse name: None    Number of children: None    Years of education: None    Highest education level: None   Occupational History    None   Social Needs    Financial resource strain: None    Food insecurity     Worry: None     Inability: None    Transportation needs     Medical: None     Non-medical: None   Tobacco Use    Smoking status: Never Smoker    Smokeless tobacco: Never Used   Substance and Sexual Activity    Alcohol use: Not Currently     Comment: rare    Drug use: Never    Sexual activity: Never   Lifestyle    Physical activity     Days per week: None     Minutes per session: None    Stress: None   Relationships    Social connections     Talks on phone: None     Gets together: None     Attends Yazidism service: None     Active member of club or organization: None     Attends meetings of clubs or organizations: None     Relationship status: None    Intimate partner violence Fear of current or ex partner: None     Emotionally abused: None     Physically abused: None     Forced sexual activity: None   Other Topics Concern    None   Social History Narrative    None       Current Outpatient Medications   Medication Sig Dispense Refill    senna-docusate (PERICOLACE) 8.6-50 MG per tablet Take 1 tablet by mouth daily      metoprolol succinate (TOPROL XL) 100 MG extended release tablet Take 1 tablet by mouth 2 times daily Takes along with a 50mg for total of 150mg BID 60 tablet 11    sucralfate (CARAFATE) 1 GM tablet Take 1 tablet by mouth 4 times daily 120 tablet 3    pantoprazole (PROTONIX) 40 MG tablet Take 1 tablet by mouth every morning (before breakfast) 30 tablet 3    vancomycin (VANCOCIN) 50 mg/mL oral solution Take 2.5 mLs by mouth every 6 hours 100 mL 1    lisinopril (PRINIVIL;ZESTRIL) 20 MG tablet Take 20 mg by mouth 2 times daily      magnesium oxide (MAG-OX) 400 MG tablet Take one tablet twice daily for (5) days, then one tablet daily for (7) days. 17 tablet 0    digoxin (LANOXIN) 125 MCG tablet Take 1 tablet by mouth daily (Patient taking differently: Take 125 mcg by mouth Daily with lunch ) 30 tablet 3    ondansetron (ZOFRAN) 8 MG tablet Take 8 mg by mouth every 8 hours as needed for Nausea or Vomiting States she has not needed to take recently.  metoprolol succinate (TOPROL XL) 50 MG extended release tablet TAKE 1 TABLET BY MOUTH TWICE A DAY (Patient taking differently: Take 50 mg by mouth 2 times daily ) 60 tablet 11    apixaban (ELIQUIS) 5 MG TABS tablet Take 5 mg by mouth 2 times daily       atorvastatin (LIPITOR) 40 MG tablet Take 1 tablet by mouth nightly 30 tablet 3    calcium carbonate (OSCAL) 500 MG TABS tablet Take 1 tablet by mouth 2 times daily 100 tablet 5     No current facility-administered medications for this visit.          Allergies   Allergen Reactions    Adhesive Tape Rash       Chief Complaint:  Windsordexter Roque is here today for follow up and management/recomendations for Paroxysmal mitral fibrillation     History of Present Illness: Nicole Barron was hospitalized with Covid in December. She was then hospitalized again in the end of December with a GI bleed, severe anemia, and C. difficile. She was found to be in atrial fibrillation with RVR at that time. Rate control was performed due to discontinuance of her Eliquis and comorbidities at that time. She has been placed back on Eliquis since her discharge. She states that she has been on the Eliquis for over 2 months and states that she has not missed a single dose for over 2 months. She is more fatigued and has significant loss of endurance since her 2 hospitalizations. This is slowly improving. She states that She does house work, goes up the stairs. She denies any chest discomfort, dyspnea on exertion, orthopnea/PND. She has chronic lower extremity edema that she states has not changed for many years. She states that her legs have been swollen \"my whole life\". She denies any presyncopal symptoms. REVIEW OF SYSTEMS:  As above. Patient does not complain of any fever, chills, nausea, vomiting or diarrhea. No focal, motor or neurological deficits. No changes in his/her vision, hearing, bowel or bladder habits. She is not known to have a history of thyroid problems. No recent nose bleeds. PHYSICAL EXAM:  Vitals:    03/18/21 1436   BP: 110/70   Pulse: 68   Resp: 14   Weight: 150 lb 12.8 oz (68.4 kg)   Height: 5' 8\" (1.727 m)       GENERAL:  She is alert and oriented x 3, communicates well, in no distress. NECK:  No masses, trachea is mid position. Supple, full ROM, no JVD or bruits. No palpable thyromegaly or lymphadenopathy. HEART:  irregular rate and rhythm. Normal S1 and S2. There is a I-II/VI systolic murmur. LUNGS:  Clear to auscultation bilaterally. No use of accessory muscles. symmetrical excursion. Mildly decreased air movement.   ABDOMEN:  Soft, non-tender. Normal bowel sounds. EXTREMITIES:  Full ROM x 4. Moderate bilateral lower extremity edema. Good distal pulses. EYES:  Extraocular muscles intact. PERRL. Normal lids & conjunctiva. ENT:  Nares are clear & not bleeding. Moist mucosa. Normal lips formation. No external masses   NEURO: no tremors, full ROM x 4, EOMI. SKIN:  Warm, dry and intact. Normal turgor. EKG: Atrial fibrillation, 68 bpm, nl axis, nonspecific ST - T wave changes. Assessment:   1. Acute CVA 2-8-18. Hypertension. Not well controlled today. 2. Paroxysmal atrial fibrillation. Recurring in the setting of Covid infection and GI bleed with anemia. She is now staying in atrial fibrillation but her heart rate is controlled. 3. Fatigue and decreased endurance. Most likely due to a combination of her Covid, GI bleed, anemia, breast cancer and undergoing chemotherapy, and recurring atrial fibrillation. 4. Hypertension, well controlled today. 5. CVA 2-18  6. Chronic lower extremity edema. Unchanged as above        Recommendations:  1. Continue her current cardiac medications. 2. Check TSH  3. Continue the metoprolol, digoxin, and Eliquis. 4. Cardioversion. She has not missed any Eliquis for over 2 months. ISAÍAS is not needed. Echo was just performed on 12/3/2020. No need to repeat. CARDIOVERSION:  I discussed with them the risks & benefits of a cardioversion including but not limited to sedation, skin burns, and malignant dysrhythmias. Patient states that he/she understands and agrees to proceed. Thank you for allowing me to participate in your patient's care.       [id number], [address] [facility]  Interventional Cardiology

## 2021-03-19 ENCOUNTER — TELEPHONE (OUTPATIENT)
Dept: CARDIOLOGY CLINIC | Age: 66
End: 2021-03-19

## 2021-03-19 DIAGNOSIS — Z01.818 PREOPERATIVE TESTING: Primary | ICD-10-CM

## 2021-03-19 NOTE — TELEPHONE ENCOUNTER
COVID scheduled for 3/26/21 [place]) for [facility] 4/1/21 [place] Patient notified to self-quarantine from day of testing until procedure. She states she understands and will comply.

## 2021-03-26 ENCOUNTER — HOSPITAL ENCOUNTER (OUTPATIENT)
Age: 66
Discharge: HOME | End: 2021-03-28
Payer: COMMERCIAL

## 2021-03-26 DIAGNOSIS — Z01.818 PREOPERATIVE TESTING: ICD-10-CM

## 2021-03-26 PROCEDURE — U0003 INFECTIOUS AGENT DETECTION BY NUCLEIC ACID (DNA OR RNA); SEVERE ACUTE RESPIRATORY SYNDROME CORONAVIRUS 2 (SARS-COV-2) (CORONAVIRUS DISEASE [COVID-19]), AMPLIFIED PROBE TECHNIQUE, MAKING USE OF HIGH THROUGHPUT TECHNOLOGIES AS DESCRIBED BY CMS-2020-01-R: HCPCS

## 2021-03-28 PROCEDURE — U0003 INFECTIOUS AGENT DETECTION BY NUCLEIC ACID (DNA OR RNA); SEVERE ACUTE RESPIRATORY SYNDROME CORONAVIRUS 2 (SARS-COV-2) (CORONAVIRUS DISEASE [COVID-19]), AMPLIFIED PROBE TECHNIQUE, MAKING USE OF HIGH THROUGHPUT TECHNOLOGIES AS DESCRIBED BY CMS-2020-01-R: HCPCS

## 2021-03-28 PROCEDURE — U0005 INFEC AGEN DETEC AMPLI PROBE: HCPCS

## 2021-03-29 LAB
SARS-COV-2: NOT DETECTED
SOURCE: NORMAL

## 2021-03-31 ENCOUNTER — TELEPHONE (OUTPATIENT)
Dept: CARDIAC CATH/INVASIVE PROCEDURES | Age: 66
End: 2021-03-31

## 2021-04-01 ENCOUNTER — HOSPITAL ENCOUNTER (OUTPATIENT)
Dept: CARDIAC CATH/INVASIVE PROCEDURES | Age: 66
Discharge: HOME | End: 2021-04-01
Payer: COMMERCIAL

## 2021-04-01 ENCOUNTER — ANESTHESIA (OUTPATIENT)
Dept: CARDIAC CATH/INVASIVE PROCEDURES | Age: 66
End: 2021-04-01

## 2021-04-01 ENCOUNTER — ANESTHESIA EVENT (OUTPATIENT)
Dept: CARDIAC CATH/INVASIVE PROCEDURES | Age: 66
End: 2021-04-01

## 2021-04-01 VITALS
DIASTOLIC BLOOD PRESSURE: 70 MMHG | SYSTOLIC BLOOD PRESSURE: 115 MMHG | HEART RATE: 73 BPM | RESPIRATION RATE: 20 BRPM | OXYGEN SATURATION: 98 %

## 2021-04-01 VITALS
DIASTOLIC BLOOD PRESSURE: 70 MMHG | OXYGEN SATURATION: 97 % | SYSTOLIC BLOOD PRESSURE: 115 MMHG | RESPIRATION RATE: 16 BRPM

## 2021-04-01 LAB
ANION GAP SERPL CALCULATED.3IONS-SCNC: 10 MMOL/L (ref 7–16)
ANISOCYTOSIS: ABNORMAL
APTT: 39.4 SEC (ref 24.5–35.1)
BASOPHILS ABSOLUTE: 0 E9/L (ref 0–0.2)
BASOPHILS RELATIVE PERCENT: 0.3 % (ref 0–2)
BUN BLDV-MCNC: 6 MG/DL (ref 8–23)
CALCIUM SERPL-MCNC: 8.8 MG/DL (ref 8.6–10.2)
CHLORIDE BLD-SCNC: 102 MMOL/L (ref 98–107)
CO2: 27 MMOL/L (ref 22–29)
CREAT SERPL-MCNC: 0.8 MG/DL (ref 0.5–1)
EKG ATRIAL RATE: 101 BPM
EKG Q-T INTERVAL: 364 MS
EKG QRS DURATION: 82 MS
EKG QTC CALCULATION (BAZETT): 442 MS
EKG R AXIS: 93 DEGREES
EKG T AXIS: 33 DEGREES
EKG VENTRICULAR RATE: 89 BPM
EOSINOPHILS ABSOLUTE: 0 E9/L (ref 0.05–0.5)
EOSINOPHILS RELATIVE PERCENT: 0 % (ref 0–6)
GFR AFRICAN AMERICAN: >60
GFR NON-AFRICAN AMERICAN: >60 ML/MIN/1.73
GLUCOSE BLD-MCNC: 90 MG/DL (ref 74–99)
HCT VFR BLD CALC: 30.8 % (ref 34–48)
HEMOGLOBIN: 9.6 G/DL (ref 11.5–15.5)
INR BLD: 1.7
LYMPHOCYTES ABSOLUTE: 0.48 E9/L (ref 1.5–4)
LYMPHOCYTES RELATIVE PERCENT: 13 % (ref 20–42)
MAGNESIUM: 1.1 MG/DL (ref 1.6–2.6)
MCH RBC QN AUTO: 28.2 PG (ref 26–35)
MCHC RBC AUTO-ENTMCNC: 31.2 % (ref 32–34.5)
MCV RBC AUTO: 90.6 FL (ref 80–99.9)
METAMYELOCYTES RELATIVE PERCENT: 0.9 % (ref 0–1)
MONOCYTES ABSOLUTE: 0.33 E9/L (ref 0.1–0.95)
MONOCYTES RELATIVE PERCENT: 8.7 % (ref 2–12)
MYELOCYTE PERCENT: 0.9 % (ref 0–0)
NEUTROPHILS ABSOLUTE: 2.89 E9/L (ref 1.8–7.3)
NEUTROPHILS RELATIVE PERCENT: 75.7 % (ref 43–80)
NUCLEATED RED BLOOD CELLS: 0.9 /100 WBC
OVALOCYTES: ABNORMAL
PDW BLD-RTO: 18.4 FL (ref 11.5–15)
PLATELET # BLD: 80 E9/L (ref 130–450)
PLATELET CONFIRMATION: NORMAL
PMV BLD AUTO: 12.8 FL (ref 7–12)
POIKILOCYTES: ABNORMAL
POLYCHROMASIA: ABNORMAL
POTASSIUM SERPL-SCNC: 3 MMOL/L (ref 3.5–5)
PROMYELOCYTES PERCENT: 0.9 % (ref 0–0)
PROTHROMBIN TIME: 18.9 SEC (ref 9.3–12.4)
RBC # BLD: 3.4 E12/L (ref 3.5–5.5)
SODIUM BLD-SCNC: 139 MMOL/L (ref 132–146)
TEAR DROP CELLS: ABNORMAL
WBC # BLD: 3.7 E9/L (ref 4.5–11.5)

## 2021-04-01 PROCEDURE — 92960 CARDIOVERSION ELECTRIC EXT: CPT | Performed by: INTERNAL MEDICINE

## 2021-04-01 PROCEDURE — 85610 PROTHROMBIN TIME: CPT

## 2021-04-01 PROCEDURE — 85025 COMPLETE CBC W/AUTO DIFF WBC: CPT

## 2021-04-01 PROCEDURE — 83735 ASSAY OF MAGNESIUM: CPT

## 2021-04-01 PROCEDURE — 2580000003 HC RX 258: Performed by: NURSE ANESTHETIST, CERTIFIED REGISTERED

## 2021-04-01 PROCEDURE — 85730 THROMBOPLASTIN TIME PARTIAL: CPT

## 2021-04-01 PROCEDURE — 2709999900 HC NON-CHARGEABLE SUPPLY

## 2021-04-01 PROCEDURE — 80048 BASIC METABOLIC PNL TOTAL CA: CPT

## 2021-04-01 PROCEDURE — 6360000002 HC RX W HCPCS: Performed by: NURSE ANESTHETIST, CERTIFIED REGISTERED

## 2021-04-01 PROCEDURE — 3700000000 HC ANESTHESIA ATTENDED CARE

## 2021-04-01 PROCEDURE — 93005 ELECTROCARDIOGRAM TRACING: CPT | Performed by: INTERNAL MEDICINE

## 2021-04-01 PROCEDURE — 6360000002 HC RX W HCPCS: Performed by: INTERNAL MEDICINE

## 2021-04-01 PROCEDURE — 6370000000 HC RX 637 (ALT 250 FOR IP): Performed by: INTERNAL MEDICINE

## 2021-04-01 PROCEDURE — 36415 COLL VENOUS BLD VENIPUNCTURE: CPT

## 2021-04-01 PROCEDURE — 2580000003 HC RX 258: Performed by: INTERNAL MEDICINE

## 2021-04-01 RX ORDER — SODIUM CHLORIDE 9 MG/ML
INJECTION, SOLUTION INTRAVENOUS CONTINUOUS PRN
Status: DISCONTINUED | OUTPATIENT
Start: 2021-04-01 | End: 2021-04-01 | Stop reason: SDUPTHER

## 2021-04-01 RX ORDER — PROPOFOL 10 MG/ML
INJECTION, EMULSION INTRAVENOUS PRN
Status: DISCONTINUED | OUTPATIENT
Start: 2021-04-01 | End: 2021-04-01 | Stop reason: SDUPTHER

## 2021-04-01 RX ADMIN — SODIUM CHLORIDE: 9 INJECTION, SOLUTION INTRAVENOUS at 15:18

## 2021-04-01 RX ADMIN — POTASSIUM BICARBONATE 40 MEQ: 782 TABLET, EFFERVESCENT ORAL at 13:30

## 2021-04-01 RX ADMIN — MAGNESIUM SULFATE HEPTAHYDRATE 6000 MG: 500 INJECTION, SOLUTION INTRAMUSCULAR; INTRAVENOUS at 11:24

## 2021-04-01 RX ADMIN — PROPOFOL 70 MG: 10 INJECTION, EMULSION INTRAVENOUS at 15:24

## 2021-04-01 NOTE — ANESTHESIA PRE PROCEDURE
Department of Anesthesiology  Preprocedure Note       Name:  Dorinda Reyes   Age:  73 y.o.  :  1955                                          MRN:  [id number]         Date:  2021      Surgeon: * Surgery not found *    Procedure: DCCV    Medications prior to admission:   Prior to Admission medications    Medication Sig Start Date End Date Taking? Authorizing Provider   senna-docusate (PERICOLACE) 8.6-50 MG per tablet Take 1 tablet by mouth daily    Historical Provider, MD   metoprolol succinate (TOPROL XL) 100 MG extended release tablet Take 1 tablet by mouth 2 times daily Takes along with a 50mg for total of 150mg BID 21   Remi Man DO   sucralfate (CARAFATE) 1 GM tablet Take 1 tablet by mouth 4 times daily 21   Aravind Do DO   pantoprazole (PROTONIX) 40 MG tablet Take 1 tablet by mouth every morning (before breakfast) 21   Aravind Do DO   vancomycin (VANCOCIN) 50 mg/mL oral solution Take 2.5 mLs by mouth every 6 hours 21   Tennille Do DO   lisinopril (PRINIVIL;ZESTRIL) 20 MG tablet Take 20 mg by mouth 2 times daily    Historical Provider, MD   magnesium oxide (MAG-OX) 400 MG tablet Take one tablet twice daily for (5) days, then one tablet daily for (7) days. 20   Remi Man DO   digoxin (LANOXIN) 125 MCG tablet Take 1 tablet by mouth daily  Patient taking differently: Take 125 mcg by mouth Daily with lunch  20   Jessee Jimenez MD   ondansetron (ZOFRAN) 8 MG tablet Take 8 mg by mouth every 8 hours as needed for Nausea or Vomiting States she has not needed to take recently.     Historical Provider, MD   metoprolol succinate (TOPROL XL) 50 MG extended release tablet TAKE 1 TABLET BY MOUTH TWICE A DAY  Patient taking differently: Take 50 mg by mouth 2 times daily  20   Remi Man DO   apixaban (ELIQUIS) 5 MG TABS tablet Take 5 mg by mouth 2 times daily     Historical Provider, MD   atorvastatin (LIPITOR) 40 MG tablet Take 1 tablet by mouth nightly 2/12/18   Angely Dyson MD   calcium carbonate (OSCAL) 500 MG TABS tablet Take 1 tablet by mouth 2 times daily 2/12/18   Angely Dyson MD       Current medications:    Current Outpatient Medications   Medication Sig Dispense Refill    senna-docusate (PERICOLACE) 8.6-50 MG per tablet Take 1 tablet by mouth daily      metoprolol succinate (TOPROL XL) 100 MG extended release tablet Take 1 tablet by mouth 2 times daily Takes along with a 50mg for total of 150mg BID 60 tablet 11    sucralfate (CARAFATE) 1 GM tablet Take 1 tablet by mouth 4 times daily 120 tablet 3    pantoprazole (PROTONIX) 40 MG tablet Take 1 tablet by mouth every morning (before breakfast) 30 tablet 3    vancomycin (VANCOCIN) 50 mg/mL oral solution Take 2.5 mLs by mouth every 6 hours 100 mL 1    lisinopril (PRINIVIL;ZESTRIL) 20 MG tablet Take 20 mg by mouth 2 times daily      magnesium oxide (MAG-OX) 400 MG tablet Take one tablet twice daily for (5) days, then one tablet daily for (7) days. 17 tablet 0    digoxin (LANOXIN) 125 MCG tablet Take 1 tablet by mouth daily (Patient taking differently: Take 125 mcg by mouth Daily with lunch ) 30 tablet 3    ondansetron (ZOFRAN) 8 MG tablet Take 8 mg by mouth every 8 hours as needed for Nausea or Vomiting States she has not needed to take recently.  metoprolol succinate (TOPROL XL) 50 MG extended release tablet TAKE 1 TABLET BY MOUTH TWICE A DAY (Patient taking differently: Take 50 mg by mouth 2 times daily ) 60 tablet 11    apixaban (ELIQUIS) 5 MG TABS tablet Take 5 mg by mouth 2 times daily       atorvastatin (LIPITOR) 40 MG tablet Take 1 tablet by mouth nightly 30 tablet 3    calcium carbonate (OSCAL) 500 MG TABS tablet Take 1 tablet by mouth 2 times daily 100 tablet 5     No current facility-administered medications for this visit. Allergies:     Allergies   Allergen Reactions    Adhesive Tape Rash       Problem List:    Patient Active Problem List   Diagnosis Code  TIA (transient ischemic attack) G45.9    Acute CVA (cerebrovascular accident) ([address] I63.9    Transient cerebral ischemia G45.9    PAF (paroxysmal [facility] fibrillation) [facility] I48.0    Breast CA (HCC) C50.919    SARAN (acute kidney injury) ([address] N17.9    Chemotherapy adverse reaction T45.1X5A    Leukocytosis D72.829    Septic shock with acute organ dysfunction due to Gram positive cocci (HCC) A41.89, R65.21    COVID-19 U07.1    Atrial fibrillation with RVR (HCC) I48.91    GI bleed K92.2    History of 2019 novel coronavirus disease (COVID-19) Z86.16    C. difficile diarrhea A04.72    Hypertension I10    Stroke due to embolism of right posterior cerebral artery ([facility]) I63.431       Past Medical History:        Diagnosis Date    Acute CVA (cerebrovascular accident) ([address] 02/10/2018    [facility] 12/03/2020    Atrial fibrillation with RVR ([facility] 12/04/2020    Breast CA ([facility] 12/03/2020    C. difficile diarrhea 01/01/2021    Cancer ([address] 11/2020    Breast-left and lymph nodes     Chemotherapy adverse reaction 12/03/2020    GI bleed 12/29/2020    History of 2019 novel coronavirus disease (COVID-19) 12/03/2000    History of blood transfusion     Hx of blood clots     Hyperlipidemia     Hypertension     Leukocytosis 12/03/2020    Osteoarthritis     PAF (paroxysmal atrial fibrillation) ([address] 02/26/2018    Septic shock with acute organ dysfunction due to Gram positive cocci ([address] 12/04/2020    Stroke due to embolism of right posterior cerebral artery ([facility] 02/10/2018    TIA (transient ischemic attack) 02/09/2018    Transient cerebral ischemia        Past Surgical History:        Procedure Laterality Date    CERVIX SURGERY      COLONOSCOPY  2010    Negative findings    SAUL US PERQ DEVICE SOFT TISSUE PLMT  FIRST LESION  09/30/2020    SAUL US PERQ DEVICE SOFT TISSUE PLMT  FIRST LESION 9/30/2020 [facility]    PORT SURGERY N/A 11/13/2020    POWER PORT INSERTION, RIGHT 27 12/29/2020    ALT 15 12/29/2020       POC Tests: No results for input(s): POCGLU, POCNA, POCK, POCCL, POCBUN, POCHEMO, POCHCT in the last 72 hours. Coags:   Lab Results   Component Value Date    PROTIME 17.6 12/29/2020    INR 1.5 12/29/2020    APTT 24.8 12/03/2020       HCG (If Applicable): No results found for: PREGTESTUR, PREGSERUM, HCG, HCGQUANT     ABGs: No results found for: PHART, PO2ART, VBR6NQX, OXM7TAQ, BEART, D7MTBSCM     Type & Screen (If Applicable):  No results found for: LABABO, LABRH    Drug/Infectious Status (If Applicable):  No results found for: HIV, HEPCAB    COVID-19 Screening (If Applicable):   Lab Results   Component Value Date    COVID19 Not Detected 03/28/2021    COVID19 Not Detected 12/30/2020         Anesthesia Evaluation  Patient summary reviewed and Nursing notes reviewed no history of anesthetic complications:   Airway: Mallampati: II  TM distance: >3 FB   Neck ROM: full  Mouth opening: > = 3 FB Dental:      Comment: Intact, missing left upper molar. Pulmonary:Negative Pulmonary ROS and normal exam  breath sounds clear to auscultation                             Cardiovascular:    (+) hypertension: moderate, dysrhythmias: atrial fibrillation, murmur (Grade 2/6 murmur), hyperlipidemia      ECG reviewed  Rhythm: irregular  Rate: normal  Echocardiogram reviewed                  Neuro/Psych:   (+) CVA ((Stroke due to embolism of right posterior cerebral artery )Patient had peripheral vision loss which essentially returned after receiving TPA):, TIA,             GI/Hepatic/Renal:   (+) GERD:,           Endo/Other:    (+) blood dyscrasia: anticoagulation therapy:., .                 Abdominal:           Vascular:                                        Anesthesia Plan      MAC     ASA 3       Induction: intravenous. Anesthetic plan and risks discussed with patient. Plan discussed with attending.                   LALA Cruz - CRNA   4/1/2021    Pt seen, examined, chart reviewed, plan discussed.   [facility]  4/1/2021

## 2021-04-02 NOTE — ANESTHESIA POSTPROCEDURE EVALUATION
Department of Anesthesiology  Postprocedure Note    Patient: Sharmin Azar  MRN: [id number]  YOB: 1955  Date of evaluation: 4/1/2021  Time:  9:53 PM     Procedure Summary     Date: 04/01/21 [place] [facility] [place] [facility]    Anesthesia Start: 5941 Anesthesia Stop: 9948    Procedure: CARDIOVERSION WITH ANESTHESIA Diagnosis: Unspecified atrial fibrillation    Scheduled Providers:  Responsible Provider: Marya Freeman MD    Anesthesia Type: MAC ASA Status: 3          Anesthesia Type: MAC    Esteban Phase I:      Esteban Phase II:      Last vitals: Reviewed and per EMR flowsheets.        Anesthesia Post Evaluation    Patient location during evaluation: PACU  Patient participation: complete - patient participated  Level of consciousness: awake  Airway patency: patent  Nausea & Vomiting: no nausea and no vomiting  Complications: no  Cardiovascular status: hemodynamically stable  Respiratory status: acceptable  Hydration status: stable

## 2021-04-03 ENCOUNTER — HOSPITAL ENCOUNTER (EMERGENCY)
Age: 66
Discharge: HOME | End: 2021-04-03
Attending: EMERGENCY MEDICINE
Payer: COMMERCIAL

## 2021-04-03 VITALS
TEMPERATURE: 98.6 F | RESPIRATION RATE: 14 BRPM | BODY MASS INDEX: 22.73 KG/M2 | SYSTOLIC BLOOD PRESSURE: 127 MMHG | WEIGHT: 150 LBS | DIASTOLIC BLOOD PRESSURE: 79 MMHG | HEART RATE: 64 BPM | OXYGEN SATURATION: 95 % | HEIGHT: 68 IN

## 2021-04-03 DIAGNOSIS — E83.42 HYPOMAGNESEMIA: ICD-10-CM

## 2021-04-03 DIAGNOSIS — R19.7 DIARRHEA, UNSPECIFIED TYPE: Primary | ICD-10-CM

## 2021-04-03 LAB
ALBUMIN SERPL-MCNC: 3.9 G/DL (ref 3.5–5.2)
ALP BLD-CCNC: 161 U/L (ref 35–104)
ALT SERPL-CCNC: 18 U/L (ref 0–32)
ANION GAP SERPL CALCULATED.3IONS-SCNC: 8 MMOL/L (ref 7–16)
ANISOCYTOSIS: ABNORMAL
AST SERPL-CCNC: 18 U/L (ref 0–31)
BASOPHILS ABSOLUTE: 0 E9/L (ref 0–0.2)
BASOPHILS RELATIVE PERCENT: 0 % (ref 0–2)
BILIRUB SERPL-MCNC: 0.3 MG/DL (ref 0–1.2)
BUN BLDV-MCNC: 5 MG/DL (ref 8–23)
CALCIUM SERPL-MCNC: 9.2 MG/DL (ref 8.6–10.2)
CHLORIDE BLD-SCNC: 103 MMOL/L (ref 98–107)
CO2: 29 MMOL/L (ref 22–29)
CREAT SERPL-MCNC: 0.9 MG/DL (ref 0.5–1)
EOSINOPHILS ABSOLUTE: 0 E9/L (ref 0.05–0.5)
EOSINOPHILS RELATIVE PERCENT: 0 % (ref 0–6)
GFR AFRICAN AMERICAN: >60
GFR NON-AFRICAN AMERICAN: >60 ML/MIN/1.73
GLUCOSE BLD-MCNC: 104 MG/DL (ref 74–99)
HCT VFR BLD CALC: 31 % (ref 34–48)
HEMOGLOBIN: 9.7 G/DL (ref 11.5–15.5)
LACTIC ACID, SEPSIS: 1.1 MMOL/L (ref 0.5–1.9)
LIPASE: 53 U/L (ref 13–60)
LYMPHOCYTES ABSOLUTE: 1.77 E9/L (ref 1.5–4)
LYMPHOCYTES RELATIVE PERCENT: 12.9 % (ref 20–42)
MAGNESIUM: 1.5 MG/DL (ref 1.6–2.6)
MCH RBC QN AUTO: 28.7 PG (ref 26–35)
MCHC RBC AUTO-ENTMCNC: 31.3 % (ref 32–34.5)
MCV RBC AUTO: 91.7 FL (ref 80–99.9)
METAMYELOCYTES RELATIVE PERCENT: 0.9 % (ref 0–1)
MONOCYTES ABSOLUTE: 1.5 E9/L (ref 0.1–0.95)
MONOCYTES RELATIVE PERCENT: 11.2 % (ref 2–12)
MYELOCYTE PERCENT: 1.7 % (ref 0–0)
NEUTROPHILS ABSOLUTE: 10.34 E9/L (ref 1.8–7.3)
NEUTROPHILS RELATIVE PERCENT: 73.3 % (ref 43–80)
NUCLEATED RED BLOOD CELLS: 2.6 /100 WBC
OVALOCYTES: ABNORMAL
PDW BLD-RTO: 18.8 FL (ref 11.5–15)
PLATELET # BLD: 116 E9/L (ref 130–450)
PMV BLD AUTO: 12.1 FL (ref 7–12)
POIKILOCYTES: ABNORMAL
POLYCHROMASIA: ABNORMAL
POTASSIUM SERPL-SCNC: 3.8 MMOL/L (ref 3.5–5)
RBC # BLD: 3.38 E12/L (ref 3.5–5.5)
SODIUM BLD-SCNC: 140 MMOL/L (ref 132–146)
TEAR DROP CELLS: ABNORMAL
TOTAL PROTEIN: 6.1 G/DL (ref 6.4–8.3)
WBC # BLD: 13.6 E9/L (ref 4.5–11.5)

## 2021-04-03 PROCEDURE — 85025 COMPLETE CBC W/AUTO DIFF WBC: CPT

## 2021-04-03 PROCEDURE — 99283 EMERGENCY DEPT VISIT LOW MDM: CPT

## 2021-04-03 PROCEDURE — 93005 ELECTROCARDIOGRAM TRACING: CPT | Performed by: EMERGENCY MEDICINE

## 2021-04-03 PROCEDURE — 83735 ASSAY OF MAGNESIUM: CPT

## 2021-04-03 PROCEDURE — 6360000002 HC RX W HCPCS: Performed by: EMERGENCY MEDICINE

## 2021-04-03 PROCEDURE — 36415 COLL VENOUS BLD VENIPUNCTURE: CPT

## 2021-04-03 PROCEDURE — 2580000003 HC RX 258: Performed by: EMERGENCY MEDICINE

## 2021-04-03 PROCEDURE — 96365 THER/PROPH/DIAG IV INF INIT: CPT

## 2021-04-03 PROCEDURE — 83605 ASSAY OF LACTIC ACID: CPT

## 2021-04-03 PROCEDURE — 83690 ASSAY OF LIPASE: CPT

## 2021-04-03 PROCEDURE — 80053 COMPREHEN METABOLIC PANEL: CPT

## 2021-04-03 RX ORDER — 0.9 % SODIUM CHLORIDE 0.9 %
1000 INTRAVENOUS SOLUTION INTRAVENOUS ONCE
Status: COMPLETED | OUTPATIENT
Start: 2021-04-03 | End: 2021-04-03

## 2021-04-03 RX ORDER — MAGNESIUM SULFATE 1 G/100ML
1000 INJECTION INTRAVENOUS ONCE
Status: COMPLETED | OUTPATIENT
Start: 2021-04-03 | End: 2021-04-03

## 2021-04-03 RX ADMIN — SODIUM CHLORIDE 1000 ML: 9 INJECTION, SOLUTION INTRAVENOUS at 12:51

## 2021-04-03 RX ADMIN — MAGNESIUM SULFATE HEPTAHYDRATE 1000 MG: 1 INJECTION, SOLUTION INTRAVENOUS at 13:59

## 2021-04-03 NOTE — ED PROVIDER NOTES
HPI:  4/3/21,   Time: 12:37 PM EDT       Errol Garcia is a 73 y.o. female presenting to the ED for diarrhea and concern for diet dehydration, beginning 2 days ago. The complaint has been intermittent, mild in severity, and worsened by nothing. Patient is a pleasant 81-year-old female who presents emergency department with concerns for some dehydration. She is currently undergoing chemotherapy for breast cancer. Her last chemotherapy treatment was 9 days ago. She has not had any fevers or chills. She is a history of A. fib and with her ongoing treatments has been more persistently in it despite being on Eliquis and Toprol. She actually had outpatient cardioversion by Dr. Erna Baker 2 days ago. She states that was successful. She did have some electrolyte abnormalities when she presented for the cardioversion. Her potassium was low as well as her magnesium. She was given IV magnesium and oral potassium for supplementation. She did have one episode of diarrhea 2 days ago she had 2 episodes yesterday and 2 episodes today. She saw her PCP yesterday and he started her on magnesium and potassium oral supplements daily. Her PCP told her to come in for possible rehydration for dehydration to have her electrolytes checked. She denies any abdominal pain. No fevers or chills. No chest pain or shortness of breath. She has no flank pain or urinary complaints. She denies any increased fatigue. No nausea or vomiting. She is able tolerate p.o. fluids well. She has only had 2 episodes of diarrhea today.       Review of Systems:   Pertinent positives and negatives are stated within HPI, all other systems reviewed and are negative.          --------------------------------------------- PAST HISTORY ---------------------------------------------  Past Medical History:  has a past medical history of Acute [facility] SARAN (acute kidney injury) ([facility] Atrial fibrillation with RVR ([facility] Breast CA [facility] C. difficile diarrhea, Cancer ([facility] Chemotherapy adverse reaction, GI bleed, History of 2019 novel coronavirus disease (COVID-19), History of blood transfusion, Hx of blood clots, Hyperlipidemia, Hypertension, Leukocytosis, Osteoarthritis, PAF (paroxysmal atrial fibrillation) ([facility] Septic shock with acute organ dysfunction due to Gram positive cocci [facility] Stroke due to embolism of right posterior cerebral artery ([facility] TIA (transient ischemic attack), and Transient cerebral ischemia. Past Surgical History:  has a past surgical history that includes [place] tooth extraction; Tonsillectomy; Cervix surgery; US BREAST BIOPSY W LOC DEVICE 1ST LESION LEFT (09/30/2020); [facility] US GUIDED PLACE LOC DEVICE PERC 1ST LESION (09/30/2020); [address] Surgery (N/A, 11/13/2020); Port Surgery (N/A, 12/06/2020); transthoracic echocardiogram (05/2018); Colonoscopy (2010); Upper gastrointestinal endoscopy (N/A, 12/31/2020); and Port Surgery (N/A, 1/15/2021). Social History:  reports that she has never smoked. She has never used smokeless tobacco. She reports previous alcohol use. She reports that she does not use drugs. Family History: family history includes Heart Disease in her mother. The patients home medications have been reviewed. Allergies: Adhesive tape        ---------------------------------------------------PHYSICAL EXAM--------------------------------------    Constitutional/General: Alert and oriented x3, well appearing, non toxic in NAD  Head: Normocephalic and atraumatic  Eyes: PERRL, EOMI, conjunctive normal, sclera non icteric  Mouth: Oropharynx clear, handling secretions, no trismus, no asymmetry of the posterior oropharynx or uvular edema  Neck: Supple, full ROM, non tender to palpation in the midline, no stridor, no crepitus, no meningeal signs  Respiratory: Lungs clear to auscultation bilaterally, no wheezes, rales, or rhonchi. Not in respiratory distress  Cardiovascular:  Regular rate. Regular rhythm. No murmurs, gallops, or rubs. 2+ distal pulses  Chest: No chest wall tenderness  GI:  Abdomen Soft, Non tender, Non distended. +BS. No organomegaly, no palpable masses,  No rebound, guarding, or rigidity. Musculoskeletal: Moves all extremities x 4. Warm and well perfused, no clubbing, cyanosis, or edema. Capillary refill <3 seconds  Integument: skin warm and dry. No rashes. Lymphatic: no lymphadenopathy noted  Neurologic: GCS 15, no focal deficits, symmetric strength 5/5 in the upper and lower extremities bilaterally  Psychiatric: Normal Affect    -------------------------------------------------- RESULTS -------------------------------------------------  I have personally reviewed all laboratory and imaging results for this patient. Results are listed below.      LABS:  Results for orders placed or performed during the hospital encounter of 04/03/21   Comprehensive Metabolic Panel   Result Value Ref Range    Sodium 140 132 - 146 mmol/L    Potassium 3.8 3.5 - 5.0 mmol/L    Chloride 103 98 - 107 mmol/L    CO2 29 22 - 29 mmol/L    Anion Gap 8 7 - 16 mmol/L    Glucose 104 (H) 74 - 99 mg/dL    BUN 5 (L) 8 - 23 mg/dL    CREATININE 0.9 0.5 - 1.0 mg/dL    GFR Non-African American >60 >=60 mL/min/1.73    GFR African American >60     Calcium 9.2 8.6 - 10.2 mg/dL    Total Protein 6.1 (L) 6.4 - 8.3 g/dL    Albumin 3.9 3.5 - 5.2 g/dL    Total Bilirubin 0.3 0.0 - 1.2 mg/dL    Alkaline Phosphatase 161 (H) 35 - 104 U/L    ALT 18 0 - 32 U/L    AST 18 0 - 31 U/L   CBC Auto Differential   Result Value Ref Range    WBC 13.6 (H) 4.5 - 11.5 E9/L    RBC 3.38 (L) 3.50 - 5.50 E12/L    Hemoglobin 9.7 (L) 11.5 - 15.5 g/dL    Hematocrit 31.0 (L) 34.0 - 48.0 %    MCV 91.7 80.0 - 99.9 fL    MCH 28.7 26.0 - 35.0 pg    MCHC 31.3 (L) 32.0 - 34.5 %    RDW 18.8 (H) 11.5 - 15.0 fL    Platelets 483 (L) 742 - 450 E9/L    MPV 12.1 (H) 7.0 - 12.0 fL    Neutrophils % 73.3 43.0 - 80.0 %    Lymphocytes % 12.9 (L) 20.0 - 42.0 % Monocytes % 11.2 2.0 - 12.0 %    Eosinophils % 0.0 0.0 - 6.0 %    Basophils % 0.0 0.0 - 2.0 %    Neutrophils Absolute 10.34 (H) 1.80 - 7.30 E9/L    Lymphocytes Absolute 1.77 1.50 - 4.00 E9/L    Monocytes Absolute 1.50 (H) 0.10 - 0.95 E9/L    Eosinophils Absolute 0.00 (L) 0.05 - 0.50 E9/L    Basophils Absolute 0.00 0.00 - 0.20 E9/L    Metamyelocytes Relative 0.9 0.0 - 1.0 %    Myelocyte Percent 1.7 0 - 0 %    nRBC 2.6 /100 WBC    Anisocytosis 1+     Polychromasia 1+     Poikilocytes 1+     Ovalocytes 1+     Tear Drop Cells 1+    Lactate, Sepsis   Result Value Ref Range    Lactic Acid, Sepsis 1.1 0.5 - 1.9 mmol/L   Lipase   Result Value Ref Range    Lipase 53 13 - 60 U/L   Magnesium   Result Value Ref Range    Magnesium 1.5 (L) 1.6 - 2.6 mg/dL   EKG 12 Lead   Result Value Ref Range    Ventricular Rate 64 BPM    Atrial Rate 64 BPM    P-R Interval 170 ms    QRS Duration 78 ms    Q-T Interval 378 ms    QTc Calculation (Bazett) 389 ms    P Axis 66 degrees    R Axis 87 degrees    T Axis 70 degrees       RADIOLOGY:  Interpreted by Radiologist.  No orders to display       EKG: This EKG is signed and interpreted by the EP. Time: 13:50  Rate: 64  Rhythm: Sinus  Interpretation: no acute changes; no ST changes. QTc 389  Comparison: changes compared to previous EKG; previous EKG showed A. fib. Patient is now normal sinus rhythm.    ------------------------- NURSING NOTES AND VITALS REVIEWED ---------------------------   The nursing notes within the ED encounter and vital signs as below have been reviewed by myself. /79   Pulse 64   Temp 98.6 °F (37 °C) (Oral)   Resp 14   Ht 5' 8\" (1.727 m)   Wt 150 lb (68 kg)   SpO2 95%   BMI 22.81 kg/m²   Oxygen Saturation Interpretation: Normal    The patients available past medical records and past encounters were reviewed.         ------------------------------ ED COURSE/MEDICAL DECISION MAKING----------------------  Medications   0.9 % sodium chloride bolus (0 mLs Intravenous Stopped 4/3/21 1352)   magnesium sulfate 1000 mg in dextrose 5% 100 mL IVPB (0 mg Intravenous Stopped 4/3/21 1514)         ED COURSE:  ED Course as of Apr 04 1033   Sat Apr 03, 2021   1409 Patient's magnesium was slightly low here at 1.5. Normal being 1.6. She is on oral supplementation but I will give an IV dose of 1 g here to further supplement her. Her potassium was normal at 3.8. She states she was also started on potassium orally from her PCP yesterday. Otherwise lab work looks normal and I do feel patient would be able to be discharged once magnesium is infused. That is her preference. [KK]      ED Course User Index  [KK] Shade Williamson MD       Medical Decision Making:    Is a pleasant 81-year-old female who is currently undergoing chemotherapy for breast cancer. She had recent outpatient cardioversion 2 days ago for recurrent A. fib. This was successful. She had electrolyte abnormalities with low magnesium as well potassium. Those electrolytes were repleted 2 days ago. She had some mild diarrhea since that time. No abdominal pain. No fevers or chills. No blood in her stool. Tenderness on abdominal exam.  She was sent in to have her electrolytes rechecked. We will give IV fluids and recheck electrolytes here in the department. Differential diagnosis includes dehydration diarrhea electrolyte abnormality fatigue    This patient has remained hemodynamically stable during their ED course. Patient had an EKG shows sinus rhythm at 64 beats a minute no signs of any ST changes. Patient had a CBC. Was normal other than a white count of 13.6. Chronic anemia at 9.7. Her chemistry showed a normal potassium at 3.8. Magnesium was slightly low at 1.5. Days ago her magnesium had 1 been 1.1 she was repleted with IV medications prior to her cardioversion. She will be given a gram of IV mag here in the emergency department. Lipase is normal lactic acid was normal.  No fevers.   No abdominal pain. James Santiago Her magnesium was repleted. She is already on oral potassium and magnesium supplements at home. Repeat abdominal exam is soft nontender before discharge. I do not feel she needs imaging. She is very comfortable plan to be discharged home she was rehydrated with IV fluids as well. To follow-up closely with her PCP return to the ER for any new worsening symptoms. Re-Evaluations:             Re-evaluation. Patients symptoms are improving. Is pleasant feeling quite well here. She is very eager to be discharged. She was discharged on oral potassium as well as magnesium supplements as an outpatient. Repeat abdominal exam is soft and nontender she is having minimal diarrhea over the course of the past couple days I do feel she is stable for discharge and close follow-up with her PCP. Re-examination  4/3/21   12:37 PM EDT          Vital Signs:   Vitals:    04/03/21 1155 04/03/21 1402   BP: (!) 145/76 127/79   Pulse: 77 64   Resp: 18 14   Temp: 98.6 °F (37 °C)    TempSrc: Oral    SpO2: 95%    Weight: 150 lb (68 kg)    Height: 5' 8\" (1.727 m)            Counseling: The emergency provider has spoken with the patient and discussed todays results, in addition to providing specific details for the plan of care and counseling regarding the diagnosis and prognosis. Questions are answered at this time and they are agreeable with the plan.       --------------------------------- IMPRESSION AND DISPOSITION ---------------------------------    IMPRESSION  1. Diarrhea, unspecified type Stable   2. Hypomagnesemia Stable       DISPOSITION  Disposition: Discharge to home  Patient condition is stable    NOTE: This report was transcribed using voice recognition software.  Every effort was made to ensure accuracy; however, inadvertent computerized transcription errors may be present        Karina Larson MD  04/04/21 1037

## 2021-04-04 LAB
EKG ATRIAL RATE: 64 BPM
EKG P AXIS: 66 DEGREES
EKG P-R INTERVAL: 170 MS
EKG Q-T INTERVAL: 378 MS
EKG QRS DURATION: 78 MS
EKG QTC CALCULATION (BAZETT): 389 MS
EKG R AXIS: 87 DEGREES
EKG T AXIS: 70 DEGREES
EKG VENTRICULAR RATE: 64 BPM

## 2021-04-04 PROCEDURE — 93010 ELECTROCARDIOGRAM REPORT: CPT | Performed by: INTERNAL MEDICINE

## 2021-04-08 ENCOUNTER — TELEPHONE (OUTPATIENT)
Dept: CARDIOLOGY CLINIC | Age: 66
End: 2021-04-08

## 2021-04-08 ENCOUNTER — NURSE ONLY (OUTPATIENT)
Dept: CARDIOLOGY CLINIC | Age: 66
End: 2021-04-08
Payer: COMMERCIAL

## 2021-04-08 DIAGNOSIS — I48.0 PAF (PAROXYSMAL ATRIAL FIBRILLATION) (HCC): Primary | ICD-10-CM

## 2021-04-08 PROCEDURE — 93000 ELECTROCARDIOGRAM COMPLETE: CPT | Performed by: INTERNAL MEDICINE

## 2021-04-08 NOTE — PROGRESS NOTES
Patient was seen in the office today for an EKG per . Sharon Juan    She's feeling better since cardioversion, she is now taking Magnesium 400 mg daily now and wants to make sure Dr Kristy Juan knows.

## 2021-04-08 NOTE — TELEPHONE ENCOUNTER
Let her know that she is maintaining normal rhythm. It is good that her magnesium is being replaced. I will defer the magnesium to the PCP.

## 2021-04-09 NOTE — TELEPHONE ENCOUNTER
Patient notified of EKG results and Dr. Hicks Phlegm recommendation. Patient asking if she needs to continue Digoxin. She thought Dr. Tyshawn Jhaveri mentioned stopping it after she was in sinus rhythm. Please advise.

## 2021-04-17 NOTE — PROCEDURES
Cardioversion note    Indication: Symptomatic atrial fibrillation    Sedation performed by anesthesia. Successful cardioversion to sinus rhythm. She tolerated the procedure well with no complications.

## 2021-05-01 ENCOUNTER — HOSPITAL ENCOUNTER (OUTPATIENT)
Dept: NON INVASIVE DIAGNOSTICS | Age: 66
Discharge: HOME | End: 2021-05-01
Payer: COMMERCIAL

## 2021-05-01 PROCEDURE — 93308 TTE F-UP OR LMTD: CPT

## 2021-05-24 ENCOUNTER — HOSPITAL ENCOUNTER (OUTPATIENT)
Age: 66
Discharge: HOME | End: 2021-05-26

## 2021-05-24 PROCEDURE — 88331 PATH CONSLTJ SURG 1 BLK 1SPC: CPT

## 2021-05-24 PROCEDURE — 88307 TISSUE EXAM BY PATHOLOGIST: CPT

## 2021-06-21 ENCOUNTER — HOSPITAL ENCOUNTER (OUTPATIENT)
Dept: RADIATION ONCOLOGY | Age: 66
Discharge: HOME | End: 2021-06-21
Payer: COMMERCIAL

## 2021-06-21 VITALS
DIASTOLIC BLOOD PRESSURE: 76 MMHG | SYSTOLIC BLOOD PRESSURE: 134 MMHG | HEART RATE: 67 BPM | OXYGEN SATURATION: 97 % | BODY MASS INDEX: 22.81 KG/M2 | RESPIRATION RATE: 18 BRPM | TEMPERATURE: 97 F | WEIGHT: 150 LBS

## 2021-06-21 DIAGNOSIS — Z85.3 HISTORY OF LEFT BREAST CANCER: Primary | ICD-10-CM

## 2021-06-21 PROCEDURE — 99205 OFFICE O/P NEW HI 60 MIN: CPT

## 2021-06-21 PROCEDURE — 99245 OFF/OP CONSLTJ NEW/EST HI 55: CPT | Performed by: RADIOLOGY

## 2021-06-21 RX ORDER — METOPROLOL SUCCINATE 50 MG/1
TABLET, EXTENDED RELEASE ORAL
Qty: 60 TABLET | Refills: 11 | Status: SHIPPED
Start: 2021-06-21 | End: 2022-04-19

## 2021-06-21 NOTE — PROGRESS NOTES
Lalito Grad  1955 73 y.o. Referring Physician: Dr. Leela Zaldivar    PCP: Julio César Silveira MD     Vitals:    21 1637   BP: 134/76   Pulse: 67   Resp: 18   Temp: 97 °F (36.1 °C)   SpO2: 97%        Wt Readings from Last 3 Encounters:   21 150 lb (68 kg)   21 150 lb (68 kg)   21 150 lb 12.8 oz (68.4 kg)        Body mass index is 22.81 kg/m². Chief Complaint: No chief complaint on file. Cancer Staging  No matching staging information was found for the patient. Prior Radiation Therapy? NO    Concurrent Chemo/radiation? NO    Prior Chemotherapy? YES: Site Treated: left [facility]          Facility: dr. Victoria Alicea          Date: -2021    Prior Hormonal Therapy? NO    Head and Neck Cancer? No, patient does NOT have HN cancer. LMP: 49    Age at first Menses: 16    : 1    Para: 1        Current Outpatient Medications   Medication Sig Dispense Refill    metoprolol succinate (TOPROL XL) 50 MG extended release tablet TAKE 1 TABLET BY MOUTH TWICE A DAY 60 tablet 11    senna-docusate (PERICOLACE) 8.6-50 MG per tablet Take 1 tablet by mouth daily      metoprolol succinate (TOPROL XL) 100 MG extended release tablet Take 1 tablet by mouth 2 times daily Takes along with a 50mg for total of 150mg BID 60 tablet 11    sucralfate (CARAFATE) 1 GM tablet Take 1 tablet by mouth 4 times daily 120 tablet 3    pantoprazole (PROTONIX) 40 MG tablet Take 1 tablet by mouth every morning (before breakfast) 30 tablet 3    lisinopril (PRINIVIL;ZESTRIL) 20 MG tablet Take 20 mg by mouth 2 times daily      magnesium oxide (MAG-OX) 400 MG tablet Take one tablet twice daily for (5) days, then one tablet daily for (7) days. 17 tablet 0    ondansetron (ZOFRAN) 8 MG tablet Take 8 mg by mouth every 8 hours as needed for Nausea or Vomiting States she has not needed to take recently.       apixaban (ELIQUIS) 5 MG TABS tablet Take 5 mg by mouth 2 times daily       atorvastatin (LIPITOR) 40 MG tablet Take 1 tablet by mouth nightly 30 tablet 3    calcium carbonate (OSCAL) 500 MG TABS tablet Take 1 tablet by mouth 2 times daily 100 tablet 5     No current facility-administered medications for this encounter.        Past Medical History:   Diagnosis Date    Acute CVA (cerebrovascular accident) ([facility] 02/10/2018    SARAN (acute kidney injury) ([facility] 12/03/2020    [facility] 12/04/2020    Breast CA ([facility] 12/03/2020    C. difficile diarrhea 01/01/2021    Cancer ([facility] 11/2020    Breast-left and lymph nodes     Chemotherapy adverse reaction 12/03/2020    GI bleed 12/29/2020    History of 2019 novel coronavirus disease (COVID-19) 12/03/2000    History of blood transfusion     Hx of blood clots     Hyperlipidemia     Hypertension     Leukocytosis 12/03/2020    Osteoarthritis     PAF (paroxysmal atrial fibrillation) ([facility] 02/26/2018    Septic shock with acute organ dysfunction due to Gram positive cocci ([facility] 12/04/2020    Stroke due to embolism of right posterior cerebral artery ([facility] 02/10/2018    TIA (transient ischemic attack) 02/09/2018    Transient cerebral ischemia        Past Surgical History:   Procedure Laterality Date    CERVIX SURGERY      COLONOSCOPY  2010    Negative findings    SAUL US PERQ DEVICE SOFT TISSUE PLMT  FIRST LESION  09/30/2020    SAUL US PERQ DEVICE SOFT TISSUE PLMT  FIRST LESION 9/30/2020 [facility]    PORT SURGERY N/A 11/13/2020    POWER PORT INSERTION, RIGHT SUBCLAVIAN performed by Bella Harvey MD at [address] [place] N/A 12/06/2020    PORT REMOVAL performed by Bella Harvey MD at [address] [place] N/A 1/15/2021    POWER PORT INSERTION performed by Bella [facility] at [facility]  05/2018    No shunt; no vegetation    UPPER GASTROINTESTINAL ENDOSCOPY N/A 12/31/2020    EGD ESOPHAGOGASTRODUODENOSCOPY ANTRAL BIOPSY performed by Bella Harvey MD at [address] [place] [address]  US BREAST NEEDLE BIOPSY LEFT  09/30/2020     BREAST NEEDLE BIOPSY LEFT 9/30/2020 [facility]    WISDOM TOOTH EXTRACTION         Family History   Problem Relation Age of Onset    Heart Disease Mother        Social History     Socioeconomic History    Marital status:      Spouse name: Not on file    Number of children: Not on file    Years of education: Not on file    Highest education level: Not on file   Occupational History    Not on file   Tobacco Use    Smoking status: Never Smoker    Smokeless tobacco: Never Used   Vaping Use    Vaping Use: Never used   Substance and Sexual Activity    Alcohol use: Not Currently     Comment: rare    Drug use: Never    Sexual activity: Never   Other Topics Concern    Not on file   Social History Narrative    Not on file     Social Determinants of Health     Financial Resource Strain:     Difficulty of Paying Living Expenses:    Food Insecurity:     Worried About Running Out of Food in the Last Year:     [address] in the Last Year:    Transportation Needs:     Lack of Transportation (Medical):  Lack of Transportation (Non-Medical):    Physical Activity:     Days of Exercise per Week:     Minutes of Exercise per Session:    Stress:     Feeling of Stress :    Social Connections:     Frequency of Communication with Friends and Family:     Frequency of Social Gatherings with Friends and Family:     Attends [employer] Services:     Active Member of Clubs or Organizations:     Attends Club or Organization Meetings:     Marital Status:    Intimate Partner Violence:     Fear of Current or Ex-Partner:     Emotionally Abused:     Physically Abused:     Sexually Abused:            Occupation: [employer]  Retired:  NO        REVIEW OF SYSTEMS: <<For Level 5, 10 or more systems>>   Approximately 20 minutes was spent with patient about radiation therapy to her left breast utilizing handouts and slides.  In August 2020 patient said Risk 1. Provide assistance as indicated for ambulation activities  2. Reorient confused/cognitively impaired patient  3. Call-light/bell within patient's reach  4. Chair/bed in low position, stretcher/bed with siderails up except when performing patient care activities  5. Educate patient/family/caregiver on falls prevention  6.  Reassess in 12 weeks or with any noted change in patient condition which places them at a risk for a fall   4-6 Moderate Risk 1. Provide assistance as indicated for ambulation activities  2. Reorient confused/cognitively impaired patient  3. Call-light/bell within patient's reach  4. Chair/bed in low position, stretcher/bed with siderails up except when performing patient care activities  5. Educate patient/family/caregiver on falls prevention  6. Falls risk precaution (Yellow sticker Level II) placed on patient chart   7 or   Higher High Risk 1. Place patient in easily observable treatment room  2. Patient attended at all times by family member or staff  3. Provide assistance as indicated for ambulation activities  4. Reorient confused/cognitively impaired patient  5. Call-light/bell within patient's reach  6. Chair/bed in low position, stretcher/bed with siderails up except when performing patient care activities  7. Educate patient/family/caregiver on falls prevention  8. Falls risk precaution (Yellow sticker Level III) placed on patient chart           MALNUTRITION RISK SCREENING ASSESSMENT    Instructions:  Assess the patient and enter the appropriate indicators that are present for nutrition risk identification. Total the numbers entered and assign a risk score. Follow the appropriate action for total score listed below. Assessment   Date  6/21/2021     1. Have you lost weight without trying? 0- No     2. Have you been eating poorly because of a decreased appetite? 0- No   3. Do you have a diagnosis of head and neck cancer?       0- No TOTAL 0          Score of 0-1: No action  Score 2 or greater:  · For Non-Diabetic Patient: Recommend adding Ensure Complete 2 x daily and provide patient with Ensure wellness bag with coupons  · For Diabetic Patient: Recommend adding Glucerna Shake 2 x daily and provide patient with Glucerna Wellness bag with coupons  · Route to the dietitian via [address]    · Are you having difficulty performing daily routine tasks due to fatigue or weakness (ie: bathing/showering, dressing, housework, meal prep, work, , etc): No     · Do you have any arm flexibility/ROM restrictions, swelling or pain that limit activity: No     · Any changes in memory, attention/focus that impact daily activities: no     · Do you avoid participation in leisure/social activity due to weakness, fatigue or pain: No     ARE ANY OF THE ABOVE ARE ANSWERED YES: No          PT ASSESSMENT FOR REFERRAL    · Have you had any recent falls in the past 2 months: No     · Do you have difficulty going up/down stairs: No     · Are you having difficulty walking: No     · Do you often hold onto furniture/environmental supports or feel off balance when you are walking: No     · Do you need to take rest breaks when you are walking: No     · Any pain on a scale of 1-10 that limits your mobility: No 0/10    ARE ANY OF THE ABOVE ARE ANSWERED YES: No           LYMPHEDEMA SCREENING ASSESSMENT FOR PATIENTS WITH BREAST CANCER    The patient reports the following signs/symptoms of lymphedema: None    Please ask the provider to assess patient for lymphedema for any reported signs or symptoms so a referral to Lymphedema Therapy can be considered. PREHAB AUDIOLOGY REFERRAL    - Is patient planned to receive Cisplatin? No. This patient is not planned to start Cisplatin.     - Is patient planned to receive radiation therapy that may be directed toward auditory canals or nerves? No. Patient is not planned to start radiation therapy to auditory canals or nerves. - Is patient complaining of new onset hearing loss? No. Patient is not complaining of new onset hearing loss. Patient education given on radiation therapy to her left breast. The patient expresses understanding and acceptance of instructions.  Marly Rust RN 6/21/2021 4:38 PM           Marly Rust RN

## 2021-06-21 NOTE — PROGRESS NOTES
Radiation Oncology      [address]      Referring Physician: Dr. Paul Moore MD   Primary Oncologist: Dr. Kaitlyn Colin      Diagnosis: cT1 cN1, ypT0 ypN0 left breast cancer s/p NACT + BCS      Service:  Radiation Oncology consultation performed on 6/21/21        HPI:        Lux Block is a pleasant 73year old with left breast cancer ( node + ) s/p NACT and BCS. In August 2020 patient said she noticed pulling in her left breast. In Sept. 2020 pt had a mammogram and US of the Left breast showing focal asymmetry of the left breast 12 O'clock position measuring 10x7mm On Sept 30, 2020 pt had a left breast bx showing stage II (cT1N1) ER + DE - HER2 +, DCIS, 2 axillary left LN concern for diseased revealed metastatic carcinoma. A MRI of the breast was done on 10/27/20 showing a 10mm lesion 12 o'clock position adjacent to the bx tissue marker clip and multiple large left axillary LN largest measuring 2.3cm. Pt completed 6 cycles of neoadjuvant chemotherapy with Taxotere, carboplatin, Herceptin, and perjeta NOV 20, 2020-April 15, 2021. Dr. Roxanne Foster on 5/24/2021 did a left lumpectomy and SLN bx. Dr. Jai Joya recommends adjuvant therapy. The patient presents today to discuss fractionated external beam radiation therapy as a component of multidisciplinary, adjuvant management. We reviewed the available medical records including the complete medical history of this pt today prior to consultation. Epic -CE and available scanned documents per the Epic Media tab were reviewed PRN. A complete ROS was also performed today and is noted below. During consultation today I personally discussed the pts workup to date; including but not limited to applicable imaging studies, Pathology reports, and interventions.   The NCCN guidelines, as pertaining to the above diagnosis were also recapped for the pt today in brief. Today, Aidan Winkler  notes Sx that include soreness and alopecia. KPS 70.          -----      Per [address] :      [facility] records reviewed. -----      Pathology reviewed:        9/30/21:  Diagnosis:   A.  Left breast, 12:00, core biopsy: Benign breast tissue with   fibrocystic change and columnar cell change     B.  Left axillary lymph node, core biopsy: Metastatic carcinoma   consistent with breast primary, see comment. Breast Cancer Marker Studies:   Estrogen Receptors (ER): Positive          Percentage of cells positive: 20%          Intensity: Weak     Progesterone Receptors (MO): Negative         Internal control cells present and stain as expected: No internal   control, specimen is lymph node       5/24/21:  Diagnosis:   A.  Lymph node, left breast sentinel node #1, biopsy: Patchy fibrosis and   focal reactive changes; negative for malignancy. B.  Lymph node, left breast sentinel node #2, biopsy: Patchy fibrosis;   negative for malignancy. Anay Olmos, left, excision of mass:    -Focal atypical duct epithelial hyperplasia (see comment).  -Fibrocystic changes.    -Focal duct epithelial hyperplasia.    -Rare microcalcifications.      Comment:   Note is made of this patient's history of a left axillary   lymph node biopsy performed 9/30/2020 ([facility]-) demonstrating   metastatic carcinoma consistent with a breast primary.  The breast   excision specimen and lymph node specimens have all been submitted in   their entirety for histologic examination.  Sections of the breast   specimen demonstrate a small 2 mm focus of atypical duct epithelial   hyperplasia.  This focus is located 4 mm from the closest (medial)   margin.  The breast parenchyma otherwise shows foci of usual duct   epithelial hyperplasia in a background of fibrocystic changes, stromal   fibrosis and rare scattered microcalcifications.  Ductal carcinoma in   situ and invasive carcinoma are not identified. Yvonne Escobar the inked   margins of excision are negative for malignancy.  Both sentinel nodes are   negative for malignancy.  Patchy fibrosis, focal reactive changes and a   small histiocytic aggregate are seen within sentinel lymph node #1. Intradepartmental consultation was obtained.          -----        Past Medical History:   Diagnosis Date    Acute CVA (cerebrovascular accident) ([facility] 02/10/2018    SARAN (acute kidney injury) ([facility] 12/03/2020    [facility] RVR [facility] 12/04/2020    Breast CA ([facility] 12/03/2020    C. difficile diarrhea 01/01/2021    Cancer ([facility] 11/2020    Breast-left and lymph nodes     Chemotherapy adverse reaction 12/03/2020    GI bleed 12/29/2020    History of 2019 novel coronavirus disease (COVID-19) 12/03/2000    History of blood transfusion     Hx of blood clots     Hyperlipidemia     Hypertension     Leukocytosis 12/03/2020    Osteoarthritis     PAF (paroxysmal atrial fibrillation) ([facility] 02/26/2018    Septic shock with acute organ dysfunction due to Gram positive cocci ([facility] 12/04/2020    Stroke due to embolism of right posterior cerebral artery ([facility] 02/10/2018    TIA (transient ischemic attack) 02/09/2018    Transient cerebral ischemia        Past Surgical History:   Procedure Laterality Date    CERVIX SURGERY      COLONOSCOPY  2010    Negative findings    SAUL US PERQ DEVICE SOFT TISSUE PLMT  FIRST LESION  09/30/2020    SAUL US PERQ DEVICE SOFT TISSUE PLMT  FIRST LESION 9/30/2020 [facility]    PORT SURGERY N/A 11/13/2020    POWER PORT INSERTION, RIGHT SUBCLAVIAN performed by Melanie Crandall MD at [facility] N/A 12/06/2020    PORT REMOVAL performed by Melanie Crandall MD at [facility] N/A 1/15/2021    POWER PORT INSERTION performed by [facility]all MD at [facility] ECHOCARDIOGRAM  05/2018    No shunt; no vegetation    UPPER GASTROINTESTINAL ENDOSCOPY N/A 12/31/2020    EGD ESOPHAGOGASTRODUODENOSCOPY ANTRAL BIOPSY [facility]  09/30/2020     BREAST NEEDLE BIOPSY LEFT 9/30/2020 [facility]    WISDOM TOOTH EXTRACTION         Family History   Problem Relation Age of Onset    Heart Disease Mother        Current Outpatient Medications   Medication Sig Dispense Refill    metoprolol succinate (TOPROL XL) 50 MG extended release tablet TAKE 1 TABLET BY MOUTH TWICE A DAY 60 tablet 11    senna-docusate (PERICOLACE) 8.6-50 MG per tablet Take 1 tablet by mouth daily      metoprolol succinate (TOPROL XL) 100 MG extended release tablet Take 1 tablet by mouth 2 times daily Takes along with a 50mg for total of 150mg BID 60 tablet 11    sucralfate (CARAFATE) 1 GM tablet Take 1 tablet by mouth 4 times daily 120 tablet 3    pantoprazole (PROTONIX) 40 MG tablet Take 1 tablet by mouth every morning (before breakfast) 30 tablet 3    lisinopril (PRINIVIL;ZESTRIL) 20 MG tablet Take 20 mg by mouth 2 times daily      magnesium oxide (MAG-OX) 400 MG tablet Take one tablet twice daily for (5) days, then one tablet daily for (7) days. 17 tablet 0    ondansetron (ZOFRAN) 8 MG tablet Take 8 mg by mouth every 8 hours as needed for Nausea or Vomiting States she has not needed to take recently.  apixaban (ELIQUIS) 5 MG TABS tablet Take 5 mg by mouth 2 times daily       atorvastatin (LIPITOR) 40 MG tablet Take 1 tablet by mouth nightly 30 tablet 3    calcium carbonate (OSCAL) 500 MG TABS tablet Take 1 tablet by mouth 2 times daily 100 tablet 5     No current facility-administered medications for this encounter.        Allergies   Allergen Reactions    Adhesive Tape Rash       Social History     Socioeconomic History    Marital status:      Spouse name: None    Number of children: None    Years of education: None    Highest education level: None   Occupational History    None   Tobacco Use    Smoking status: Never Smoker    Smokeless tobacco: Never Used   Vaping Use    Vaping Use: Never used   Substance and Sexual Activity    Alcohol use: Not Currently     Comment: rare    Drug use: Never    Sexual activity: Never   Other Topics Concern    None   Social History Narrative    None     Social Determinants of Health     Financial Resource Strain:     Difficulty of Paying Living Expenses:    Food Insecurity:     Worried About Running Out of Food in the Last Year:     Ran Out of Food in the Last Year:    Transportation Needs:     Lack of Transportation (Medical):  Lack of Transportation (Non-Medical):    Physical Activity:     Days of Exercise per Week:     Minutes of Exercise per Session:    Stress:     Feeling of Stress :    Social Connections:     Frequency of Communication with Friends and Family:     Frequency of Social Gatherings with Friends and Family:     Attends [employer] Services:     Active Member of Clubs or Organizations:     Attends Club or Organization Meetings:     Marital Status:    Intimate Partner Violence:     Fear of Current or Ex-Partner:     Emotionally Abused:     Physically Abused:     Sexually Abused:            Review of Systems - History obtained from chart review and the patient  General ROS: positive for  - fatigue  Psychological ROS: negative  Ophthalmic ROS: negative  ENT ROS: negative  Allergy and Immunology ROS: negative  Hematological and Lymphatic ROS: negative  Endocrine ROS: negative  Breast ROS: negative for NEW breast lumps  Respiratory ROS: no cough, shortness of breath, or wheezing  Cardiovascular ROS: no chest pain or dyspnea on exertion  Gastrointestinal ROS: no abdominal pain, change in bowel habits, or black or bloody stools  Genito-Urinary ROS: no dysuria, trouble voiding, or hematuria  Musculoskeletal ROS: negative  Neurological ROS: no TIA or stroke symptoms  Dermatological ROS: negative        Physical Exam  HENT:      Head: Normocephalic.       Right Ear: External ear normal.      Left Ear: External ear normal.      Nose: Nose normal.      Mouth/Throat:      Mouth: Mucous membranes are moist.   Eyes:      Pupils: Pupils are equal, round, and reactive to light. Cardiovascular:      Rate and Rhythm: Normal rate and regular rhythm. Pulses: Normal pulses. Heart sounds: Normal heart sounds. Pulmonary:      Effort: Pulmonary effort is normal.   Abdominal:      General: Abdomen is flat. Musculoskeletal:         General: Normal range of motion. Cervical back: Normal range of motion. Skin:     General: Skin is warm. Neurological:      General: No focal deficit present. Mental Status: She is alert and oriented to person, place, and time. Psychiatric:         Mood and Affect: Mood normal.         Behavior: Behavior normal.         Thought Content: Thought content normal.         Judgment: Judgment normal.           Imaging reviewed:        [facility] 9/22/20:  Finding 1:   Sonography was performed in the left breast using a radial and anti-radial approach. Ultrasound shows an irregular focal area of atypical echogenicity with spiculated measuring 10 x 7 x 8 mm. Internal echogenicity is hyperechoic.       There are two abnormal left axillary lymph nodes which are concerning for metastatic disease.       IMPRESSION:   Focal area of atypical echogenicity in the left breast is suspicious.    An ultrasound guided biopsy is recommended.       =======================================   BI-RADS Category 4:  Suspicious Abnormality   =======================================           Radiation Safety and Treatment Support:  -previous Radiation history: No  -history of connective tissue disease: No  -history of autoimmune disease: No  -pregnant: not applicable  -fertility conservation and /or contraception discussed: no  -nutrition consult prior to [address]: Yes  -PEG: No  -Dental evaluation prior to treatment:No  -Social Work requested: Yes  -Oncology Nurse Navigator requested: Yes  -pre + post treatment PT / Rehab / PM+R evaluation considered: Yes  -ICD: No   -ICD brand: -  -[facility] patient navigator: Lakshmi Butler  -Nurse Practitioners for Radiation Oncology:    ---Aurora Jacques, MSN, RN, FNP-C   ---Efrain Hollingsworth, MSN, RN, FNP-BC        Assessment and Plan: Ishmael Vale is a pleasant and cooperative 73year old with a recent diagnosis of AJCC stage group IIA left breast cancer s/p NACT and BCS. We recommend adjuvant external beam radiation therapy. With a breast conserving surgery, combined with fractionated external beam radiation therapy, there is no difference in overall survival compared to mastectomy: ipsilateral breast tumor recurrence rates drop from over 30 % (35 % in the 12 year analysis of NSABP B-06) to 10% [20 year IBRT ; 39% lumpectomy alone vs 14% lumpectomy + RT /// Gui Gunn et al. Brain Bahai J Med. 2002 Oct 17;347(16):1233-41]. There is no excess risk of contralateral breast cancer recurrence per [facility] data from a similar era. Multiple other trials have demonstrated a reduction in risk of ipsilateral breast tumor recurrence by 2/3 (whole breast radiation), with further possible benefit of Tamoxifen / Aromasin therapy (per Medical Oncology). The recent EBCTCG meta analysis shows an overall local recurrence rate of less the 10 % for node negative patients. Whole breast radiation to 50 Gy in standard fractions may be combined with a boost based on specific disease and patient characteristics to further improve local control [Aleshia H, J Clin Oncol. 2007 Aug 1;25(22):9251-24] and was discussed in detail with the patient as applicable [place] / hypofractionation will also be considered based on the ACR / ROSALBA guidelines) as well as regional LN irradiation based on clinical and pathology characteristic (a thorough discussion of the potential benefits and risks on this specific aspect of treatment was performed PRN).  The risks (detailed discussion), benefits, alternatives, process and logistics of external beam radiation were reviewed. Specifically, we discussed the possible chronic radiation toxicity which may include but is not limited to lymphedema, pneumonitis, skin/cartilage necrosis (rare), rib fracture (rare) , joint pain, brachioplexopathy and cosmetic changes. We answered all of the patient's questions to the best of our ability who verbalized understanding and seemed satisfied. Radiation planning will commence within 9 days; the next step in management being the simulation scan, with external beam radiation to commence in a timely fashion thereafter. For left sided breast cancer and select right sided cases, utilization of the Active Breathing Coordinator and / or surface guided fractionated external beam radiation therapy (with Vision-RT / 4D) will be considered to reduce radiation dose to the heart (and lung in certain situations) [Lydia et al. PRO. 2015 /// Odilon et al. Sukhwinder Plant. 2011 /// April et al. [facility]. 2012  /// Vish Hodge. 2017]. It was a pleasure meeting tatyana today and we appreciate the referral and opportunity to be involved in her care. We had an extensive discussion today regarding the course to date (including a focused review of theapplicable radiographic and laboratory information), multidisciplinary approach to cancer care, and indications for external beam radiation therapy as a component therein. A literature review and multidisciplinary discussion was performed after seeing this patient due to the complexity of the medical decision making in this case. I personally spent greater than 90 minutes on this case and with this patient. I performed the complete history and physical as above at today's visit, at least 45 minutes was in direct discussion and  regarding disease management. -SIM for cardiac sparing breast + RLNI        Forrest Neumann MD [place] [address] Oncology  Cell: [phone]    [facility]:  [phone]   FAX: [phone]  [facility]:  [phone]   FAX:    [phone]  [address] [place] [address] Road:  [phone]   FAX:  [phone]        NOTE: This report was transcribed using voice recognition software. Every effort was made to ensure accuracy; however, inadvertent computerized transcription errors may be present.

## 2021-06-30 ENCOUNTER — HOSPITAL ENCOUNTER (OUTPATIENT)
Dept: RADIATION ONCOLOGY | Age: 66
Discharge: HOME | End: 2021-06-30
Attending: RADIOLOGY
Payer: COMMERCIAL

## 2021-06-30 PROCEDURE — 99999 PR OFFICE/OUTPT VISIT,PROCEDURE ONLY: CPT | Performed by: RADIOLOGY

## 2021-06-30 PROCEDURE — 77263 THER RADIOLOGY TX PLNG CPLX: CPT | Performed by: RADIOLOGY

## 2021-06-30 PROCEDURE — 77332 RADIATION TREATMENT AID(S): CPT | Performed by: RADIOLOGY

## 2021-07-08 ENCOUNTER — HOSPITAL ENCOUNTER (OUTPATIENT)
Dept: RADIATION ONCOLOGY | Age: 66
Discharge: HOME | End: 2021-07-08
Attending: RADIOLOGY
Payer: COMMERCIAL

## 2021-07-08 PROCEDURE — 77301 RADIOTHERAPY DOSE PLAN IMRT: CPT | Performed by: RADIOLOGY

## 2021-07-08 PROCEDURE — 77338 DESIGN MLC DEVICE FOR IMRT: CPT | Performed by: RADIOLOGY

## 2021-07-08 PROCEDURE — 77293 RESPIRATOR MOTION MGMT SIMUL: CPT | Performed by: RADIOLOGY

## 2021-07-08 PROCEDURE — 77300 RADIATION THERAPY DOSE PLAN: CPT | Performed by: RADIOLOGY

## 2021-07-14 ENCOUNTER — APPOINTMENT (OUTPATIENT)
Dept: RADIATION ONCOLOGY | Age: 66
End: 2021-07-14
Attending: RADIOLOGY
Payer: COMMERCIAL

## 2021-07-15 ENCOUNTER — APPOINTMENT (OUTPATIENT)
Dept: RADIATION ONCOLOGY | Age: 66
End: 2021-07-15
Attending: RADIOLOGY
Payer: COMMERCIAL

## 2021-07-16 ENCOUNTER — APPOINTMENT (OUTPATIENT)
Dept: RADIATION ONCOLOGY | Age: 66
End: 2021-07-16
Attending: RADIOLOGY
Payer: COMMERCIAL

## 2021-07-19 ENCOUNTER — APPOINTMENT (OUTPATIENT)
Dept: RADIATION ONCOLOGY | Age: 66
End: 2021-07-19
Attending: RADIOLOGY
Payer: COMMERCIAL

## 2021-07-20 ENCOUNTER — APPOINTMENT (OUTPATIENT)
Dept: RADIATION ONCOLOGY | Age: 66
End: 2021-07-20
Attending: RADIOLOGY
Payer: COMMERCIAL

## 2021-07-21 ENCOUNTER — APPOINTMENT (OUTPATIENT)
Dept: RADIATION ONCOLOGY | Age: 66
End: 2021-07-21
Attending: RADIOLOGY
Payer: COMMERCIAL

## 2021-07-22 ENCOUNTER — APPOINTMENT (OUTPATIENT)
Dept: RADIATION ONCOLOGY | Age: 66
End: 2021-07-22
Attending: RADIOLOGY
Payer: COMMERCIAL

## 2021-07-23 ENCOUNTER — APPOINTMENT (OUTPATIENT)
Dept: RADIATION ONCOLOGY | Age: 66
End: 2021-07-23
Attending: RADIOLOGY
Payer: COMMERCIAL

## 2021-07-26 ENCOUNTER — HOSPITAL ENCOUNTER (OUTPATIENT)
Dept: RADIATION ONCOLOGY | Age: 66
Discharge: HOME | End: 2021-07-26
Attending: RADIOLOGY
Payer: COMMERCIAL

## 2021-07-26 PROCEDURE — 77385 HC NTSTY MODUL RAD TX DLVR SMPL: CPT | Performed by: RADIOLOGY

## 2021-07-27 ENCOUNTER — HOSPITAL ENCOUNTER (OUTPATIENT)
Dept: RADIATION ONCOLOGY | Age: 66
Discharge: HOME | End: 2021-07-27
Attending: RADIOLOGY
Payer: COMMERCIAL

## 2021-07-27 PROCEDURE — 77385 HC NTSTY MODUL RAD TX DLVR SMPL: CPT | Performed by: RADIOLOGY

## 2021-07-28 ENCOUNTER — HOSPITAL ENCOUNTER (OUTPATIENT)
Dept: RADIATION ONCOLOGY | Age: 66
Discharge: HOME | End: 2021-07-28
Attending: RADIOLOGY
Payer: COMMERCIAL

## 2021-07-28 VITALS
HEART RATE: 62 BPM | RESPIRATION RATE: 18 BRPM | TEMPERATURE: 97.2 F | OXYGEN SATURATION: 96 % | BODY MASS INDEX: 22.09 KG/M2 | SYSTOLIC BLOOD PRESSURE: 132 MMHG | DIASTOLIC BLOOD PRESSURE: 62 MMHG | WEIGHT: 145.31 LBS

## 2021-07-28 DIAGNOSIS — C50.919 MALIGNANT NEOPLASM OF FEMALE BREAST, UNSPECIFIED ESTROGEN RECEPTOR STATUS, UNSPECIFIED LATERALITY, UNSPECIFIED SITE OF BREAST (HCC): Primary | ICD-10-CM

## 2021-07-28 PROCEDURE — 99999 PR OFFICE/OUTPT VISIT,PROCEDURE ONLY: CPT | Performed by: RADIOLOGY

## 2021-07-28 PROCEDURE — 77385 HC NTSTY MODUL RAD TX DLVR SMPL: CPT | Performed by: RADIOLOGY

## 2021-07-28 NOTE — PROGRESS NOTES
Trista Job  7/28/2021  Wt Readings from Last 3 Encounters:   07/28/21 145 lb 5 oz (65.9 kg)   06/21/21 150 lb (68 kg)   04/03/21 150 lb (68 kg)     Body mass index is 22.09 kg/m². Treatment Area:Left breast    Patient was seen today for weekly visit. Comfort Alteration  KPS:90%  Fatigue: None    Nutritional Alteration  Anorexia: No   Nausea: No   Vomiting: No     Skin Alteration   Sensation:na    Radiation Dermatitis:  None, educated re: moisturizing skin at least daily verbalizes understanding. I gave her aquaphor sample and coupon     Mucous Membrane Alteration  Drainage: No  Lymphedema: No    Emotional  Coping: effective    Sexuality Alteration  na    Injury, potential bleeding or infection: na        Lab Results   Component Value Date    WBC 13.6 (H) 04/03/2021     (L) 04/03/2021         /62   Pulse 62   Temp 97.2 °F (36.2 °C) (Temporal)   Resp 18   Wt 145 lb 5 oz (65.9 kg)   SpO2 96%   BMI 22.09 kg/m²   BP within normal range?  yes       Assessment/Plan:  Completed 3/25 fractions; 600/5000 cGy    Eddie Rosenthal RN

## 2021-07-29 ENCOUNTER — HOSPITAL ENCOUNTER (OUTPATIENT)
Dept: RADIATION ONCOLOGY | Age: 66
Discharge: HOME | End: 2021-07-29
Attending: RADIOLOGY
Payer: COMMERCIAL

## 2021-07-29 PROCEDURE — 77385 HC NTSTY MODUL RAD TX DLVR SMPL: CPT | Performed by: RADIOLOGY

## 2021-07-30 ENCOUNTER — HOSPITAL ENCOUNTER (OUTPATIENT)
Dept: RADIATION ONCOLOGY | Age: 66
Discharge: HOME | End: 2021-07-30
Attending: RADIOLOGY
Payer: COMMERCIAL

## 2021-07-30 PROCEDURE — 77385 HC NTSTY MODUL RAD TX DLVR SMPL: CPT | Performed by: RADIOLOGY

## 2021-07-30 PROCEDURE — 77336 RADIATION PHYSICS CONSULT: CPT | Performed by: RADIOLOGY

## 2021-08-02 ENCOUNTER — HOSPITAL ENCOUNTER (OUTPATIENT)
Dept: RADIATION ONCOLOGY | Age: 66
Discharge: HOME | End: 2021-08-02
Attending: RADIOLOGY
Payer: COMMERCIAL

## 2021-08-02 PROCEDURE — 77387 GUIDANCE FOR RADJ TX DLVR: CPT | Performed by: RADIOLOGY

## 2021-08-02 PROCEDURE — 77385 HC NTSTY MODUL RAD TX DLVR SMPL: CPT | Performed by: RADIOLOGY

## 2021-08-03 ENCOUNTER — HOSPITAL ENCOUNTER (OUTPATIENT)
Dept: NON INVASIVE DIAGNOSTICS | Age: 66
Discharge: HOME | End: 2021-08-03
Payer: COMMERCIAL

## 2021-08-03 ENCOUNTER — HOSPITAL ENCOUNTER (OUTPATIENT)
Dept: RADIATION ONCOLOGY | Age: 66
Discharge: HOME | End: 2021-08-03
Attending: RADIOLOGY
Payer: COMMERCIAL

## 2021-08-03 PROCEDURE — 77385 HC NTSTY MODUL RAD TX DLVR SMPL: CPT | Performed by: RADIOLOGY

## 2021-08-03 PROCEDURE — 93306 TTE W/DOPPLER COMPLETE: CPT

## 2021-08-04 ENCOUNTER — APPOINTMENT (OUTPATIENT)
Dept: RADIATION ONCOLOGY | Age: 66
End: 2021-08-04
Attending: RADIOLOGY
Payer: COMMERCIAL

## 2021-08-04 ENCOUNTER — HOSPITAL ENCOUNTER (OUTPATIENT)
Dept: RADIATION ONCOLOGY | Age: 66
Discharge: HOME | End: 2021-08-04
Attending: RADIOLOGY
Payer: COMMERCIAL

## 2021-08-04 PROCEDURE — 77385 HC NTSTY MODUL RAD TX DLVR SMPL: CPT | Performed by: RADIOLOGY

## 2021-08-05 ENCOUNTER — HOSPITAL ENCOUNTER (OUTPATIENT)
Dept: RADIATION ONCOLOGY | Age: 66
Discharge: HOME | End: 2021-08-05
Attending: RADIOLOGY
Payer: COMMERCIAL

## 2021-08-05 VITALS
HEART RATE: 71 BPM | WEIGHT: 145.9 LBS | SYSTOLIC BLOOD PRESSURE: 134 MMHG | OXYGEN SATURATION: 98 % | TEMPERATURE: 97.4 F | RESPIRATION RATE: 18 BRPM | BODY MASS INDEX: 22.18 KG/M2 | DIASTOLIC BLOOD PRESSURE: 78 MMHG

## 2021-08-05 PROCEDURE — 77385 HC NTSTY MODUL RAD TX DLVR SMPL: CPT | Performed by: RADIOLOGY

## 2021-08-05 NOTE — PROGRESS NOTES
Alisson Regan  8/5/2021  Wt Readings from Last 3 Encounters:   08/05/21 145 lb 14.4 oz (66.2 kg)   07/28/21 145 lb 5 oz (65.9 kg)   06/21/21 150 lb (68 kg)     Body mass index is 22.18 kg/m². Treatment Area:CTV L breast and RLNs    Patient was seen today for weekly visit. Comfort Alteration  KPS:90%  Fatigue: Mild    Nutritional Alteration  Anorexia: No   Nausea: No   Vomiting: No     Skin Alteration   Sensation:no    Radiation Dermatitis:  no    Mucous Membrane Alteration  Drainage: No  Lymphedema: No    Emotional  Coping: effective    Sexuality Alteration  na    Injury, potential bleeding or infection: no        Lab Results   Component Value Date    WBC 13.6 (H) 04/03/2021     (L) 04/03/2021         /78   Pulse 71   Temp 97.4 °F (36.3 °C) (Temporal)   Resp 18   Wt 145 lb 14.4 oz (66.2 kg)   SpO2 98%   BMI 22.18 kg/m²   BP within normal range? yes         Assessment/Plan: Pt completed 9/25fx and 1800/5000cGy. Pt is tolerating tx well thus far.     Melina Turner RN

## 2021-08-05 NOTE — PROGRESS NOTES
DEPARTMENT OF RADIATION ONCOLOGY   ON TREATMENT VISIT       8/5/2021      NAME:  Philly [facility]    YOB: 1955    DIAGNOSIS: Left breast cancer, [id number]     SUBJECTIVE:   Luis Alberto Calvin has now received 1800 cGy in 9/25 fractions directed to the left vbreast.      Past medical, surgical, social and family histories reviewed and updated as indicated. PAIN: 0/10    ALLERGIES:  Adhesive tape         Current Outpatient Medications   Medication Sig Dispense Refill    metoprolol succinate (TOPROL XL) 50 MG extended release tablet TAKE 1 TABLET BY MOUTH TWICE A DAY 60 tablet 11    senna-docusate (PERICOLACE) 8.6-50 MG per tablet Take 1 tablet by mouth daily      metoprolol succinate (TOPROL XL) 100 MG extended release tablet Take 1 tablet by mouth 2 times daily Takes along with a 50mg for total of 150mg BID 60 tablet 11    sucralfate (CARAFATE) 1 GM tablet Take 1 tablet by mouth 4 times daily 120 tablet 3    pantoprazole (PROTONIX) 40 MG tablet Take 1 tablet by mouth every morning (before breakfast) 30 tablet 3    lisinopril (PRINIVIL;ZESTRIL) 20 MG tablet Take 20 mg by mouth 2 times daily      magnesium oxide (MAG-OX) 400 MG tablet Take one tablet twice daily for (5) days, then one tablet daily for (7) days. 17 tablet 0    ondansetron (ZOFRAN) 8 MG tablet Take 8 mg by mouth every 8 hours as needed for Nausea or Vomiting States she has not needed to take recently.  apixaban (ELIQUIS) 5 MG TABS tablet Take 5 mg by mouth 2 times daily       atorvastatin (LIPITOR) 40 MG tablet Take 1 tablet by mouth nightly 30 tablet 3    calcium carbonate (OSCAL) 500 MG TABS tablet Take 1 tablet by mouth 2 times daily 100 tablet 5     No current facility-administered medications for this encounter. OBJECTIVE:  Alert and fully ambulatory. Pleasant and conversant.       Physical Examination:General appearance - alert, well appearing, and in no distress:  Constitutional: A well developed, well nourished 73 y.o. female who is alert, oriented, cooperative and in no apparent distress. Breast: No new changes  Cardiovascular: Regular without murmur. Skin:  Warm and dry. No obvious rashes. Vitals:    08/05/21 1053   BP: 134/78   Pulse: 71   Resp: 18   Temp: 97.4 °F (36.3 °C)   TempSrc: Temporal   SpO2: 98%   Weight: 145 lb 14.4 oz (66.2 kg)       Wt Readings from Last 3 Encounters:   08/05/21 145 lb 14.4 oz (66.2 kg)   07/28/21 145 lb 5 oz (65.9 kg)   06/21/21 150 lb (68 kg)       ASSESSMENT/PLAN:     Patient is tolerating treatments well with expected toxicities. Current and planned dose reviewed. Goals of treatment and potential side effects were reviewed with the patient. Treatment imaging has been personally reviewed for accuracy and precision. Questions answered to apparent satisfaction. Treatments will continue as planned. Thank you for the opportunity to participate in multidisciplinary management of this remarkable and pleasant patient.       Carley Onofre MD, [address], [facility], [address] [place] [address]    Department of Radiation Oncology  [address] [place] [address]) [facility]: [phone] (SDO: [phone])  [facility]: [phone] (ZPS: [phone])  [facility]:  [phone] (FEP:  [phone])

## 2021-08-06 ENCOUNTER — HOSPITAL ENCOUNTER (OUTPATIENT)
Dept: RADIATION ONCOLOGY | Age: 66
Discharge: HOME | End: 2021-08-06
Attending: RADIOLOGY
Payer: COMMERCIAL

## 2021-08-06 PROCEDURE — 77427 RADIATION TX MANAGEMENT X5: CPT | Performed by: SPECIALIST

## 2021-08-06 PROCEDURE — 77336 RADIATION PHYSICS CONSULT: CPT | Performed by: RADIOLOGY

## 2021-08-06 PROCEDURE — 77385 HC NTSTY MODUL RAD TX DLVR SMPL: CPT | Performed by: RADIOLOGY

## 2021-08-09 ENCOUNTER — HOSPITAL ENCOUNTER (OUTPATIENT)
Dept: RADIATION ONCOLOGY | Age: 66
Discharge: HOME | End: 2021-08-09
Attending: RADIOLOGY
Payer: COMMERCIAL

## 2021-08-09 PROCEDURE — 77385 HC NTSTY MODUL RAD TX DLVR SMPL: CPT | Performed by: RADIOLOGY

## 2021-08-09 PROCEDURE — 77387 GUIDANCE FOR RADJ TX DLVR: CPT | Performed by: RADIOLOGY

## 2021-08-10 ENCOUNTER — HOSPITAL ENCOUNTER (OUTPATIENT)
Dept: RADIATION ONCOLOGY | Age: 66
Discharge: HOME | End: 2021-08-10
Attending: RADIOLOGY
Payer: COMMERCIAL

## 2021-08-10 PROCEDURE — 77385 HC NTSTY MODUL RAD TX DLVR SMPL: CPT | Performed by: RADIOLOGY

## 2021-08-11 ENCOUNTER — HOSPITAL ENCOUNTER (OUTPATIENT)
Dept: RADIATION ONCOLOGY | Age: 66
Discharge: HOME | End: 2021-08-11
Attending: RADIOLOGY
Payer: COMMERCIAL

## 2021-08-11 VITALS
OXYGEN SATURATION: 99 % | BODY MASS INDEX: 22.51 KG/M2 | TEMPERATURE: 96 F | SYSTOLIC BLOOD PRESSURE: 128 MMHG | DIASTOLIC BLOOD PRESSURE: 66 MMHG | HEART RATE: 65 BPM | RESPIRATION RATE: 18 BRPM | WEIGHT: 148.06 LBS

## 2021-08-11 DIAGNOSIS — C50.919 MALIGNANT NEOPLASM OF FEMALE BREAST, UNSPECIFIED ESTROGEN RECEPTOR STATUS, UNSPECIFIED LATERALITY, UNSPECIFIED SITE OF BREAST (HCC): Primary | ICD-10-CM

## 2021-08-11 PROCEDURE — 77385 HC NTSTY MODUL RAD TX DLVR SMPL: CPT | Performed by: RADIOLOGY

## 2021-08-11 PROCEDURE — 99999 PR OFFICE/OUTPT VISIT,PROCEDURE ONLY: CPT | Performed by: RADIOLOGY

## 2021-08-12 ENCOUNTER — HOSPITAL ENCOUNTER (OUTPATIENT)
Dept: RADIATION ONCOLOGY | Age: 66
Discharge: HOME | End: 2021-08-12
Attending: RADIOLOGY
Payer: COMMERCIAL

## 2021-08-12 PROCEDURE — 77385 HC NTSTY MODUL RAD TX DLVR SMPL: CPT | Performed by: RADIOLOGY

## 2021-08-13 ENCOUNTER — HOSPITAL ENCOUNTER (OUTPATIENT)
Dept: RADIATION ONCOLOGY | Age: 66
Discharge: HOME | End: 2021-08-13
Attending: RADIOLOGY
Payer: COMMERCIAL

## 2021-08-13 PROCEDURE — 77336 RADIATION PHYSICS CONSULT: CPT | Performed by: RADIOLOGY

## 2021-08-13 PROCEDURE — 77427 RADIATION TX MANAGEMENT X5: CPT | Performed by: RADIOLOGY

## 2021-08-13 PROCEDURE — 77385 HC NTSTY MODUL RAD TX DLVR SMPL: CPT | Performed by: RADIOLOGY

## 2021-08-16 ENCOUNTER — HOSPITAL ENCOUNTER (OUTPATIENT)
Dept: RADIATION ONCOLOGY | Age: 66
Discharge: HOME | End: 2021-08-16
Attending: RADIOLOGY
Payer: COMMERCIAL

## 2021-08-16 PROCEDURE — 77387 GUIDANCE FOR RADJ TX DLVR: CPT | Performed by: RADIOLOGY

## 2021-08-16 PROCEDURE — 77385 HC NTSTY MODUL RAD TX DLVR SMPL: CPT | Performed by: RADIOLOGY

## 2021-08-17 ENCOUNTER — HOSPITAL ENCOUNTER (OUTPATIENT)
Dept: RADIATION ONCOLOGY | Age: 66
Discharge: HOME | End: 2021-08-17
Attending: RADIOLOGY
Payer: COMMERCIAL

## 2021-08-17 PROCEDURE — 77385 HC NTSTY MODUL RAD TX DLVR SMPL: CPT | Performed by: RADIOLOGY

## 2021-08-18 ENCOUNTER — HOSPITAL ENCOUNTER (OUTPATIENT)
Dept: RADIATION ONCOLOGY | Age: 66
Discharge: HOME | End: 2021-08-18
Attending: RADIOLOGY
Payer: COMMERCIAL

## 2021-08-18 VITALS
WEIGHT: 146.6 LBS | RESPIRATION RATE: 18 BRPM | SYSTOLIC BLOOD PRESSURE: 128 MMHG | HEART RATE: 65 BPM | TEMPERATURE: 97.1 F | BODY MASS INDEX: 22.29 KG/M2 | OXYGEN SATURATION: 98 % | DIASTOLIC BLOOD PRESSURE: 74 MMHG

## 2021-08-18 DIAGNOSIS — C50.919 MALIGNANT NEOPLASM OF FEMALE BREAST, UNSPECIFIED ESTROGEN RECEPTOR STATUS, UNSPECIFIED LATERALITY, UNSPECIFIED SITE OF BREAST (HCC): Primary | ICD-10-CM

## 2021-08-18 PROCEDURE — 77385 HC NTSTY MODUL RAD TX DLVR SMPL: CPT | Performed by: RADIOLOGY

## 2021-08-18 NOTE — PROGRESS NOTES
Delores Oro  8/18/2021  Wt Readings from Last 3 Encounters:   08/18/21 146 lb 9.6 oz (66.5 kg)   08/11/21 148 lb 1 oz (67.2 kg)   08/05/21 145 lb 14.4 oz (66.2 kg)     Body mass index is 22.29 kg/m². Treatment Area CTV left breast +RLNS    Patient was seen today for weekly visit. Comfort Alteration  KPS:90%  Fatigue: Mild    Nutritional Alteration  Anorexia: No   Nausea: No   Vomiting: No     Skin Alteration   Sensation:na, using lotion    Radiation Dermatitis:  na    Mucous Membrane Alteration  Drainage: No  Lymphedema: No    Emotional  Coping: effective    Sexuality Alteration  na    Injury, potential bleeding or infection: na        Lab Results   Component Value Date    WBC 13.6 (H) 04/03/2021     (L) 04/03/2021         /74   Pulse 65   Temp 97.1 °F (36.2 °C) (Skin)   Resp 18   Wt 146 lb 9.6 oz (66.5 kg)   SpO2 98%   BMI 22.29 kg/m²   BP within normal range?  yes          Assessment/Plan:  18/25fx  3600/5000cGY tolerated    Daphne Bliss RN

## 2021-08-19 ENCOUNTER — HOSPITAL ENCOUNTER (OUTPATIENT)
Dept: RADIATION ONCOLOGY | Age: 66
Discharge: HOME | End: 2021-08-19
Attending: RADIOLOGY
Payer: COMMERCIAL

## 2021-08-19 PROCEDURE — 77385 HC NTSTY MODUL RAD TX DLVR SMPL: CPT | Performed by: RADIOLOGY

## 2021-08-20 ENCOUNTER — HOSPITAL ENCOUNTER (OUTPATIENT)
Dept: RADIATION ONCOLOGY | Age: 66
Discharge: HOME | End: 2021-08-20
Attending: RADIOLOGY
Payer: COMMERCIAL

## 2021-08-20 PROCEDURE — 77336 RADIATION PHYSICS CONSULT: CPT | Performed by: RADIOLOGY

## 2021-08-20 PROCEDURE — 77385 HC NTSTY MODUL RAD TX DLVR SMPL: CPT | Performed by: RADIOLOGY

## 2021-08-23 ENCOUNTER — HOSPITAL ENCOUNTER (OUTPATIENT)
Dept: RADIATION ONCOLOGY | Age: 66
Discharge: HOME | End: 2021-08-23
Attending: RADIOLOGY
Payer: COMMERCIAL

## 2021-08-23 PROCEDURE — 77387 GUIDANCE FOR RADJ TX DLVR: CPT | Performed by: RADIOLOGY

## 2021-08-23 PROCEDURE — 77385 HC NTSTY MODUL RAD TX DLVR SMPL: CPT | Performed by: RADIOLOGY

## 2021-08-24 ENCOUNTER — HOSPITAL ENCOUNTER (OUTPATIENT)
Dept: RADIATION ONCOLOGY | Age: 66
Discharge: HOME | End: 2021-08-24
Attending: RADIOLOGY
Payer: COMMERCIAL

## 2021-08-24 PROCEDURE — 77385 HC NTSTY MODUL RAD TX DLVR SMPL: CPT | Performed by: RADIOLOGY

## 2021-08-25 ENCOUNTER — HOSPITAL ENCOUNTER (OUTPATIENT)
Dept: RADIATION ONCOLOGY | Age: 66
Discharge: HOME | End: 2021-08-25
Attending: RADIOLOGY
Payer: COMMERCIAL

## 2021-08-25 VITALS
SYSTOLIC BLOOD PRESSURE: 138 MMHG | DIASTOLIC BLOOD PRESSURE: 64 MMHG | BODY MASS INDEX: 22.34 KG/M2 | OXYGEN SATURATION: 97 % | WEIGHT: 146.9 LBS | HEART RATE: 67 BPM | TEMPERATURE: 97.1 F | RESPIRATION RATE: 18 BRPM

## 2021-08-25 DIAGNOSIS — C50.919 MALIGNANT NEOPLASM OF FEMALE BREAST, UNSPECIFIED ESTROGEN RECEPTOR STATUS, UNSPECIFIED LATERALITY, UNSPECIFIED SITE OF BREAST (HCC): Primary | ICD-10-CM

## 2021-08-25 PROCEDURE — 99999 PR OFFICE/OUTPT VISIT,PROCEDURE ONLY: CPT | Performed by: RADIOLOGY

## 2021-08-25 PROCEDURE — 77385 HC NTSTY MODUL RAD TX DLVR SMPL: CPT | Performed by: RADIOLOGY

## 2021-08-25 NOTE — PATIENT INSTRUCTIONS
Continue daily fractionated radiation therapy as scheduled. Please see weekly OTV note and intial consultation letter in [facility] for clinical details. Andreia Stockton. Shannan Smith MD MS Vivien Anderson:  [phone]   FAX: [phone]  [address] [place] [address]:  [phone]   FAX:    [phone]  Olga Dec:  [phone]   FAX:  [phone]  Email: [email or web address]

## 2021-08-25 NOTE — PROGRESS NOTES
DEPARTMENT OF RADIATION ONCOLOGY ON TREATMENT VISIT         8/11/21      NAME:  Sandi Holland    YOB: 1955    Diagnosis: breast cancer    SUBJECTIVE:   Miguel Wolfe has now received fractionated external beam radiation therapy - ongoing. Past medical, surgical, social and family histories reviewed and updated as indicated. Pain: controlled    ALLERGIES:  Adhesive tape         Current Outpatient Medications   Medication Sig Dispense Refill    metoprolol succinate (TOPROL XL) 50 MG extended release tablet TAKE 1 TABLET BY MOUTH TWICE A DAY 60 tablet 11    senna-docusate (PERICOLACE) 8.6-50 MG per tablet Take 1 tablet by mouth daily      metoprolol succinate (TOPROL XL) 100 MG extended release tablet Take 1 tablet by mouth 2 times daily Takes along with a 50mg for total of 150mg BID 60 tablet 11    sucralfate (CARAFATE) 1 GM tablet Take 1 tablet by mouth 4 times daily 120 tablet 3    pantoprazole (PROTONIX) 40 MG tablet Take 1 tablet by mouth every morning (before breakfast) 30 tablet 3    lisinopril (PRINIVIL;ZESTRIL) 20 MG tablet Take 20 mg by mouth 2 times daily      magnesium oxide (MAG-OX) 400 MG tablet Take one tablet twice daily for (5) days, then one tablet daily for (7) days. 17 tablet 0    ondansetron (ZOFRAN) 8 MG tablet Take 8 mg by mouth every 8 hours as needed for Nausea or Vomiting States she has not needed to take recently.  apixaban (ELIQUIS) 5 MG TABS tablet Take 5 mg by mouth 2 times daily       atorvastatin (LIPITOR) 40 MG tablet Take 1 tablet by mouth nightly 30 tablet 3    calcium carbonate (OSCAL) 500 MG TABS tablet Take 1 tablet by mouth 2 times daily 100 tablet 5     No current facility-administered medications for this encounter. OBJECTIVE:  Alert and fully ambulatory. Pleasant and conversant. Physical Examination: General appearance - alert, well appearing, and in no distress.           Wt Readings from Last 3 Encounters: 08/25/21 146 lb 14.4 oz (66.6 kg)   08/18/21 146 lb 9.6 oz (66.5 kg)   08/11/21 148 lb 1 oz (67.2 kg)         ASSESSMENT/PLAN:     Patient is tolerating treatments well with expected toxicities. RBA were reviewed prior to first fraction and PRN. Current and planned dose reviewed. Goals of treatment and potential side effects were reviewed with the patient PRN. Treatment imaging has been personally reviewed for accuracy and precision. Questions answered to apparent satisfaction. Treatments will continue as planned. Andreia Stockton.  Shannan Smith MD MS HOWARDR  Radiation Oncologist        [facility] ([address] [place] [address]): [phone] /// FAX: [phone]  [facility]): [phone] /// FAX: [phone]  [address] [place] [address]): [phone] /// FAX: [phone]

## 2021-08-26 ENCOUNTER — HOSPITAL ENCOUNTER (OUTPATIENT)
Dept: RADIATION ONCOLOGY | Age: 66
Discharge: HOME | End: 2021-08-26
Attending: RADIOLOGY
Payer: COMMERCIAL

## 2021-08-26 PROCEDURE — 77385 HC NTSTY MODUL RAD TX DLVR SMPL: CPT | Performed by: RADIOLOGY

## 2021-08-27 ENCOUNTER — HOSPITAL ENCOUNTER (OUTPATIENT)
Dept: RADIATION ONCOLOGY | Age: 66
Discharge: HOME | End: 2021-08-27
Attending: RADIOLOGY
Payer: COMMERCIAL

## 2021-08-27 PROCEDURE — 77385 HC NTSTY MODUL RAD TX DLVR SMPL: CPT | Performed by: RADIOLOGY

## 2021-08-27 PROCEDURE — 77427 RADIATION TX MANAGEMENT X5: CPT | Performed by: RADIOLOGY

## 2021-08-27 PROCEDURE — 77336 RADIATION PHYSICS CONSULT: CPT | Performed by: RADIOLOGY

## 2021-08-27 NOTE — ADDENDUM NOTE
Encounter addended by: Giovani Camacho RN on: 8/27/2021 7:36 AM   Actions taken: Clinical Note Signed

## 2021-08-27 NOTE — PROGRESS NOTES
Ben Garcia  8/27/2021  7:35 AM          Current Outpatient Medications   Medication Sig Dispense Refill    metoprolol succinate (TOPROL XL) 50 MG extended release tablet TAKE 1 TABLET BY MOUTH TWICE A DAY 60 tablet 11    senna-docusate (PERICOLACE) 8.6-50 MG per tablet Take 1 tablet by mouth daily      metoprolol succinate (TOPROL XL) 100 MG extended release tablet Take 1 tablet by mouth 2 times daily Takes along with a 50mg for total of 150mg BID 60 tablet 11    sucralfate (CARAFATE) 1 GM tablet Take 1 tablet by mouth 4 times daily 120 tablet 3    pantoprazole (PROTONIX) 40 MG tablet Take 1 tablet by mouth every morning (before breakfast) 30 tablet 3    lisinopril (PRINIVIL;ZESTRIL) 20 MG tablet Take 20 mg by mouth 2 times daily      magnesium oxide (MAG-OX) 400 MG tablet Take one tablet twice daily for (5) days, then one tablet daily for (7) days. 17 tablet 0    ondansetron (ZOFRAN) 8 MG tablet Take 8 mg by mouth every 8 hours as needed for Nausea or Vomiting States she has not needed to take recently.  apixaban (ELIQUIS) 5 MG TABS tablet Take 5 mg by mouth 2 times daily       atorvastatin (LIPITOR) 40 MG tablet Take 1 tablet by mouth nightly 30 tablet 3    calcium carbonate (OSCAL) 500 MG TABS tablet Take 1 tablet by mouth 2 times daily 100 tablet 5     No current facility-administered medications for this encounter. This is an up-to-date medication list.    Please take this list to your next care provider, and discard any previous medication lists.
Bety Graham  8/25/2021  Wt Readings from Last 3 Encounters:   08/25/21 146 lb 14.4 oz (66.6 kg)   08/18/21 146 lb 9.6 oz (66.5 kg)   08/11/21 148 lb 1 oz (67.2 kg)     Body mass index is 22.34 kg/m². Treatment Area:CTV left breast  RLN'S    Patient was seen today for weekly visit. Comfort Alteration  KPS:90%  Fatigue: Mild    Nutritional Alteration  Anorexia: No   Nausea: No   Vomiting: No     Skin Alteration   Sensation:good, using lotion    Radiation Dermatitis:  na    Mucous Membrane Alteration  Drainage: No  Lymphedema: No    Emotional  Coping: effective    Sexuality Alteration  na    Injury, potential bleeding or infection: na        Lab Results   Component Value Date    WBC 13.6 (H) 04/03/2021     (L) 04/03/2021         /64   Pulse 67   Temp 97.1 °F (36.2 °C) (Skin)   Resp 18   Wt 146 lb 14.4 oz (66.6 kg)   SpO2 97%   BMI 22.34 kg/m²   BP within normal range?  yes          Assessment/Plan:23/25fx  4600/5000cGY and tolerating well    Felipa Gonzalez RN
Encounters:   08/25/21 146 lb 14.4 oz (66.6 kg)   08/18/21 146 lb 9.6 oz (66.5 kg)   08/11/21 148 lb 1 oz (67.2 kg)         ASSESSMENT/PLAN:     Patient is tolerating treatments well with expected toxicities. RBA were reviewed prior to first fraction and PRN. Current and planned dose reviewed. Goals of treatment and potential side effects were reviewed with the patient PRN. Treatment imaging has been personally reviewed for accuracy and precision. Questions answered to apparent satisfaction. Treatments will continue as planned. Marj Portillo.  Susie Buck MD MS HOWARDR  Radiation Oncologist        [facility]): [phone] /// FAX: [phone]  [facility]): [phone] /// FAX: [phone]  [address] [place] [address]): [phone] /// FAX: [phone]

## 2021-09-03 NOTE — PROGRESS NOTES
DEPARTMENT OF RADIATION ONCOLOGY ON TREATMENT VISIT         7/28/21      NAME:  Carlos Holland    YOB: 1955    Diagnosis: breast cancer    SUBJECTIVE:   Yandel Joseph has now received fractionated external beam radiation therapy - ongoing. Past medical, surgical, social and family histories reviewed and updated as indicated. Pain: controlled    ALLERGIES:  Adhesive tape         Current Outpatient Medications   Medication Sig Dispense Refill    metoprolol succinate (TOPROL XL) 50 MG extended release tablet TAKE 1 TABLET BY MOUTH TWICE A DAY 60 tablet 11    senna-docusate (PERICOLACE) 8.6-50 MG per tablet Take 1 tablet by mouth daily      metoprolol succinate (TOPROL XL) 100 MG extended release tablet Take 1 tablet by mouth 2 times daily Takes along with a 50mg for total of 150mg BID 60 tablet 11    sucralfate (CARAFATE) 1 GM tablet Take 1 tablet by mouth 4 times daily 120 tablet 3    pantoprazole (PROTONIX) 40 MG tablet Take 1 tablet by mouth every morning (before breakfast) 30 tablet 3    lisinopril (PRINIVIL;ZESTRIL) 20 MG tablet Take 20 mg by mouth 2 times daily      magnesium oxide (MAG-OX) 400 MG tablet Take one tablet twice daily for (5) days, then one tablet daily for (7) days. 17 tablet 0    ondansetron (ZOFRAN) 8 MG tablet Take 8 mg by mouth every 8 hours as needed for Nausea or Vomiting States she has not needed to take recently.  apixaban (ELIQUIS) 5 MG TABS tablet Take 5 mg by mouth 2 times daily       atorvastatin (LIPITOR) 40 MG tablet Take 1 tablet by mouth nightly 30 tablet 3    calcium carbonate (OSCAL) 500 MG TABS tablet Take 1 tablet by mouth 2 times daily 100 tablet 5     No current facility-administered medications for this encounter. OBJECTIVE:  Alert and fully ambulatory. Pleasant and conversant. Physical Examination: General appearance - alert, well appearing, and in no distress.       Wt Readings from Last 3 Encounters:

## 2021-10-07 ENCOUNTER — HOSPITAL ENCOUNTER (OUTPATIENT)
Dept: RADIATION ONCOLOGY | Age: 66
Discharge: HOME | End: 2021-10-07
Attending: RADIOLOGY
Payer: COMMERCIAL

## 2021-10-07 VITALS
OXYGEN SATURATION: 98 % | DIASTOLIC BLOOD PRESSURE: 68 MMHG | TEMPERATURE: 96.1 F | HEART RATE: 66 BPM | RESPIRATION RATE: 18 BRPM | SYSTOLIC BLOOD PRESSURE: 126 MMHG | WEIGHT: 143.44 LBS | BODY MASS INDEX: 21.81 KG/M2

## 2021-10-07 DIAGNOSIS — Z17.0 MALIGNANT NEOPLASM OF LEFT BREAST IN FEMALE, ESTROGEN RECEPTOR POSITIVE, UNSPECIFIED SITE OF BREAST (HCC): Primary | ICD-10-CM

## 2021-10-07 DIAGNOSIS — C50.912 MALIGNANT NEOPLASM OF LEFT BREAST IN FEMALE, ESTROGEN RECEPTOR POSITIVE, UNSPECIFIED SITE OF BREAST (HCC): Primary | ICD-10-CM

## 2021-10-07 PROCEDURE — 99999 PR OFFICE/OUTPT VISIT,PROCEDURE ONLY: CPT | Performed by: NURSE PRACTITIONER

## 2021-10-07 RX ORDER — ANASTROZOLE 1 MG/1
1 TABLET ORAL DAILY
COMMUNITY

## 2021-10-07 NOTE — PROGRESS NOTES
RADIATION ONCOLOGY  6 week follow up         10/7/2021      NAME:  Harpreet Rosario OF BIRTH:  1955    CC: Pt returns today for re-assessment of radiation treatment area. HPI: Daniel Medeiros is a pleasant 73 y.o. female with a diagnosis of left breast cancer (ER+, GA-, Her2+), s/p NACT, BCS and adjuvant XRT. The patient underwent a course of radiation therapy to the left breast and regional lymph nodes, which was completed on 8/27/21. She completed 5000 cGy in 25 fractions. States that she is doing well. Skin has healed well from radiation therapy. No longer using any moisturizing creams to treatment site. Completes monthly self breast exams. Denies any new lumps/ bumps or discharge from the nipple. Notes persistent taste/smell changes since having Covid last year but she is pushing through this and trying to maintain a normal balanced diet. Pt is following with Dr Sunil Franklin for medical oncology. Started on anastrozole last week and tolerating well. Denies any hot flashes, joint aches/ pains, or mood changes. Continues on Herceptin. Next appt 10/14/21. Pain: Denies pain at this time. Past medical, surgical, social and family histories reviewed and updated as indicated.     ALLERGIES:  Adhesive tape         Current Outpatient Medications   Medication Sig Dispense Refill    anastrozole (ARIMIDEX) 1 MG tablet Take 1 mg by mouth daily      metoprolol succinate (TOPROL XL) 50 MG extended release tablet TAKE 1 TABLET BY MOUTH TWICE A DAY 60 tablet 11    senna-docusate (PERICOLACE) 8.6-50 MG per tablet Take 1 tablet by mouth daily      metoprolol succinate (TOPROL XL) 100 MG extended release tablet Take 1 tablet by mouth 2 times daily Takes along with a 50mg for total of 150mg BID 60 tablet 11    lisinopril (PRINIVIL;ZESTRIL) 20 MG tablet Take 20 mg by mouth 2 times daily      magnesium oxide (MAG-OX) 400 MG tablet Take one tablet twice daily for (5) days, then one tablet daily for (7) days. 17 tablet 0    ondansetron (ZOFRAN) 8 MG tablet Take 8 mg by mouth every 8 hours as needed for Nausea or Vomiting States she has not needed to take recently.  apixaban (ELIQUIS) 5 MG TABS tablet Take 5 mg by mouth 2 times daily       atorvastatin (LIPITOR) 40 MG tablet Take 1 tablet by mouth nightly 30 tablet 3    calcium carbonate (OSCAL) 500 MG TABS tablet Take 1 tablet by mouth 2 times daily 100 tablet 5     No current facility-administered medications for this encounter. Physical Examination:   Vitals:    10/07/21 0958   BP: 126/68   Pulse: 66   Resp: 18   Temp: 96.1 °F (35.6 °C)   TempSrc: Temporal   SpO2: 98%   Weight: 143 lb 7 oz (65.1 kg)       Wt Readings from Last 3 Encounters:   10/07/21 143 lb 7 oz (65.1 kg)   08/25/21 146 lb 14.4 oz (66.6 kg)   08/18/21 146 lb 9.6 oz (66.5 kg)       Alert and fully ambulatory. Pleasant and conversant. Irradiated skin shows mild hyperpigmentation and dryness mostly to chest wall. HR reg, rhythm reg. Lungs CTA. ASSESSMENT/PLAN:     Left breast cancer (ER+, OR-, Her2+), s/p NACT, BCS and adjuvant  radiation therapy to the left breast and regional lymph nodes completed on 8/27/21 (5000 cGy in 25 fractions). Patient is doing well post-radiation completion. Skin care and sun care reviewed. Signs and symptoms of recurrence reviewed. Encouraged monthly self breast exams. Imaging per med onc. TSH to be ordered at next appt d/t irradiation of regional lymph nodes. Cont to follow with Dr Lien Osei for medical oncology. Started on anastrozole last week and tolerating well. Denies any hot flashes, joint aches/ pains, or mood changes. Continues on Herceptin. Next appt 10/14/21. I discussed follow up plans with Nabil Gruber. At this time, I will see the patient back in 6 months for a post-radiation completion follow-up visit.  Nabil Gruber is to follow up with other providers involved in their care as directed (including but not limited to Medical Oncology, Primary Care, Pulmonary, and Surgery). The patient was given our contact number in the event that if at any time they change their mind and would like to return to the clinic to see either myself or one of the Radiation Oncologists, they can simply call us and we would be happy to see them sooner. Thank you for involving us in the management of this extremely pleasant patient. More than 15 min was in direct contact with pt coordinating/giving care. >50% of the visit was spent in counseling the pt on the following: Follow up care    The nurses notes were reviewed and incorporated into this assessment and plan. Questions answered to apparent satisfaction.       Jonathan Arboleda, MSN, RN, APRN-CNP  Nurse Practitioner for Radiation Oncology    PHYSICIANS [facility]) [facility]: [phone] (FEQ: [phone])  [facility]: [phone] (GSF: [phone])  [facility]:  [phone] (IR:  [phone])

## 2021-10-07 NOTE — PROGRESS NOTES
score from Table 2.  Reassess patient at a minimum every 12 weeks or with status change. Assessment   Date  10/7/2021     1. Mental Ability: confusion/cognitively impaired No - 0       2. Elimination Issues: incontinence, frequency No - 0       3. Ambulatory: use of assistive devices (walker, cane, off-loading devices), attached to equipment (IV pole, oxygen) No - 0     4. Sensory Limitations: dizziness, vertigo, impaired vision No - 0       5. Age 72 years or greater - 1       10. Medication: diuretics, strong analgesics, hypnotics, sedatives, antihypertensive agents   Yes - 3   7. Falls:  recent history of falls within the last 3 months (not to include slipping or tripping)   No - 0   TOTAL 4    If score of 4 or greater was education given? Yes       TABLE 2   Risk Score Risk Level Plan of Care   0-3 Little or  No Risk 1. Provide assistance as indicated for ambulation activities  2. Reorient confused/cognitively impaired patient  3. Call-light/bell within patient's reach  4. Chair/bed in low position, stretcher/bed with siderails up except when performing patient care activities  5. Educate patient/family/caregiver on falls prevention  6.  Reassess in 12 weeks or with any noted change in patient condition which places them at a risk for a fall   4-6 Moderate Risk 1. Provide assistance as indicated for ambulation activities  2. Reorient confused/cognitively impaired patient  3. Call-light/bell within patient's reach  4. Chair/bed in low position, stretcher/bed with siderails up except when performing patient care activities  5. Educate patient/family/caregiver on falls prevention  6. Falls risk precaution (Yellow sticker Level II) placed on patient chart   7 or   Higher High Risk 1. Place patient in easily observable treatment room  2. Patient attended at all times by family member or staff  3. Provide assistance as indicated for ambulation activities  4.   Reorient confused/cognitively impaired patient  5. Call-light/bell within patient's reach  6. Chair/bed in low position, stretcher/bed with siderails up except when performing patient care activities  7. Educate patient/family/caregiver on falls prevention  8. Falls risk precaution (Yellow sticker Level III) placed on patient chart           MALNUTRITION RISK SCREENING ASSESSMENT    10/7/2021   Patient:  Kumar Swartz  Sex:    Kumar Swartz  10/7/2021  10:06 AM      Vitals:    10/07/21 0958   BP: 126/68   Pulse: 66   Resp: 18   Temp: 96.1 °F (35.6 °C)   SpO2: 98%    : Wt Readings from Last 3 Encounters:   10/07/21 143 lb 7 oz (65.1 kg)   08/25/21 146 lb 14.4 oz (66.6 kg)   08/18/21 146 lb 9.6 oz (66.5 kg)                Current Outpatient Medications:     anastrozole (ARIMIDEX) 1 MG tablet, Take 1 mg by mouth daily, Disp: , Rfl:     metoprolol succinate (TOPROL XL) 50 MG extended release tablet, TAKE 1 TABLET BY MOUTH TWICE A DAY, Disp: 60 tablet, Rfl: 11    senna-docusate (PERICOLACE) 8.6-50 MG per tablet, Take 1 tablet by mouth daily, Disp: , Rfl:     metoprolol succinate (TOPROL XL) 100 MG extended release tablet, Take 1 tablet by mouth 2 times daily Takes along with a 50mg for total of 150mg BID, Disp: 60 tablet, Rfl: 11    lisinopril (PRINIVIL;ZESTRIL) 20 MG tablet, Take 20 mg by mouth 2 times daily, Disp: , Rfl:     magnesium oxide (MAG-OX) 400 MG tablet, Take one tablet twice daily for (5) days, then one tablet daily for (7) days. , Disp: 17 tablet, Rfl: 0    ondansetron (ZOFRAN) 8 MG tablet, Take 8 mg by mouth every 8 hours as needed for Nausea or Vomiting States she has not needed to take recently. , Disp: , Rfl:     apixaban (ELIQUIS) 5 MG TABS tablet, Take 5 mg by mouth 2 times daily , Disp: , Rfl:     atorvastatin (LIPITOR) 40 MG tablet, Take 1 tablet by mouth nightly, Disp: 30 tablet, Rfl: 3    calcium carbonate (OSCAL) 500 MG TABS tablet, Take 1 tablet by mouth 2 times daily, Disp: 100 tablet, Rfl: 5      Patient is seen today in follow up for Left breast cancer    Maria C Eaton is a very pleasant 73years old female she is here today for a 6 weeks follow up after completed radiation therapy to left breast 25 fractions; 5000 cGy from 7/26/21 to 8/27/21 R/T DCIS Stage II ER+ ID- HER-2 + left breast carcinoma. She is alert and oriented x 4, denies pain, denies skin issues to RT site. She states she started her anastrozole a week ago and she is not experiencing any side effects. She states she will see Dr Colleen Elias oncology on Thursday 10/14/21. No other complaints, Austen, CNP updated. FALLS RISK SCREENING ASSESSMENT    Instructions:  Assess the patient and enter the appropriate indicators that are present for fall risk identification. Total the numbers entered and assign a fall risk score from Table 2.  Reassess patient at a minimum every 12 weeks or with status change. Assessment   Date  10/7/2021     1. Mental Ability: confusion/cognitively impaired No - 0       2. Elimination Issues: incontinence, frequency No - 0       3. Ambulatory: use of assistive devices (walker, cane, off-loading devices), attached to equipment (IV pole, oxygen) No - 0     4. Sensory Limitations: dizziness, vertigo, impaired vision No - 0       5. Age 72 years or greater - 1       10. Medication: diuretics, strong analgesics, hypnotics, sedatives, antihypertensive agents   Yes - 3   7. Falls:  recent history of falls within the last 3 months (not to include slipping or tripping)   No - 0   TOTAL 4    If score of 4 or greater was education given? Yes       TABLE 2   Risk Score Risk Level Plan of Care   0-3 Little or  No Risk 1. Provide assistance as indicated for ambulation activities  2. Reorient confused/cognitively impaired patient  3. Call-light/bell within patient's reach  4. Chair/bed in low position, stretcher/bed with siderails up except when performing patient care activities  5.   Educate patient/family/caregiver on falls prevention  6.  Reassess in 12 weeks or with any noted change in patient condition which places them at a risk for a fall   4-6 Moderate Risk 1. Provide assistance as indicated for ambulation activities  2. Reorient confused/cognitively impaired patient  3. Call-light/bell within patient's reach  4. Chair/bed in low position, stretcher/bed with siderails up except when performing patient care activities  5. Educate patient/family/caregiver on falls prevention  6. Falls risk precaution (Yellow sticker Level II) placed on patient chart   7 or   Higher High Risk 1. Place patient in easily observable treatment room  2. Patient attended at all times by family member or staff  3. Provide assistance as indicated for ambulation activities  4. Reorient confused/cognitively impaired patient  5. Call-light/bell within patient's reach  6. Chair/bed in low position, stretcher/bed with siderails up except when performing patient care activities  7. Educate patient/family/caregiver on falls prevention  8. Falls risk precaution (Yellow sticker Level III) placed on patient chart           MALNUTRITION RISK SCREENING ASSESSMENT    10/7/2021   Patient:  Rogenia Holstein  Sex:  female    Instructions:  Assess the patient and enter the appropriate indicators that are present for nutrition risk identification. Total the numbers entered and assign a risk score. Follow the appropriate action for total score listed below. Assessment   Date  10/7/2021     1. Have you lost weight without trying? 0- No     2. Have you been eating poorly because of a decreased appetite? 0- No   3. Do you have a diagnosis of head and neck cancer?       0- No                                                                                    TOTAL 0          Score of 0-1: No action  Score 2 or greater:  · For Non-Diabetic Patient: Recommend adding Ensure Complete 2 x daily and provide patient with Ensure wellness bag with coupons  · For Diabetic Patient: Recommend adding Glucerna Shake 2 x daily and provide patient with Glucerna Wellness bag with coupons  · Route to the dietitian via Justino Quijano RN             Instructions:  Assess the patient and enter the appropriate indicators that are present for nutrition risk identification. Total the numbers entered and assign a risk score. Follow the appropriate action for total score listed below. Assessment   Date  10/7/2021     3. Have you lost weight without trying? 0- No     4. Have you been eating poorly because of a decreased appetite? 0- No   3. Do you have a diagnosis of head and neck cancer?       0- No                                                                                    TOTAL 0          Score of 0-1: No action  Score 2 or greater:  · For Non-Diabetic Patient: Recommend adding Ensure Complete 2 x daily and provide patient with Ensure wellness bag with coupons  · For Diabetic Patient: Recommend adding Glucerna Shake 2 x daily and provide patient with Glucerna Wellness bag with coupons  · Route to the dietitian via Justino Quijano RN

## 2021-11-04 ENCOUNTER — HOSPITAL ENCOUNTER (OUTPATIENT)
Dept: NON INVASIVE DIAGNOSTICS | Age: 66
Discharge: HOME | End: 2021-11-04
Payer: COMMERCIAL

## 2021-11-04 PROCEDURE — 93308 TTE F-UP OR LMTD: CPT

## 2022-02-01 ENCOUNTER — OFFICE VISIT (OUTPATIENT)
Dept: CARDIOLOGY CLINIC | Age: 67
End: 2022-02-01
Payer: COMMERCIAL

## 2022-02-01 VITALS
RESPIRATION RATE: 18 BRPM | BODY MASS INDEX: 23.49 KG/M2 | WEIGHT: 155 LBS | HEART RATE: 66 BPM | SYSTOLIC BLOOD PRESSURE: 132 MMHG | DIASTOLIC BLOOD PRESSURE: 78 MMHG | HEIGHT: 68 IN

## 2022-02-01 DIAGNOSIS — I48.0 PAF (PAROXYSMAL ATRIAL FIBRILLATION) (HCC): Primary | ICD-10-CM

## 2022-02-01 PROCEDURE — 93000 ELECTROCARDIOGRAM COMPLETE: CPT | Performed by: INTERNAL MEDICINE

## 2022-02-01 PROCEDURE — 99213 OFFICE O/P EST LOW 20 MIN: CPT | Performed by: INTERNAL MEDICINE

## 2022-02-01 RX ORDER — FOLIC ACID 1 MG/1
1 TABLET ORAL DAILY
COMMUNITY

## 2022-02-01 RX ORDER — M-VIT,TX,IRON,MINS/CALC/FOLIC 27MG-0.4MG
1 TABLET ORAL DAILY
COMMUNITY

## 2022-02-01 RX ORDER — POTASSIUM CHLORIDE 1500 MG/1
TABLET, FILM COATED, EXTENDED RELEASE ORAL
COMMUNITY
Start: 2022-01-08

## 2022-02-01 NOTE — PROGRESS NOTES
Luica Maryjose  1955  Date of Service: 2/1/2022    Patient Active Problem List    Diagnosis Date Noted    C. difficile diarrhea 01/01/2021    Hypertension     Stroke due to embolism of right posterior cerebral artery ([facility]      Overview Note:     2/10/2018      History of 2019 novel coronavirus disease (COVID-19) 12/30/2020     Overview Note:     12/3/2020      GI bleed 12/29/2020    Septic shock with acute organ dysfunction due to Gram positive cocci ([facility] 12/04/2020    COVID-19 12/04/2020    Atrial fibrillation with RVR ([facility] 12/04/2020    Breast CA ([facility] 12/03/2020    SARAN (acute kidney injury) ([facility] 12/03/2020    Chemotherapy adverse reaction 12/03/2020    Leukocytosis 12/03/2020    PAF (paroxysmal atrial fibrillation) ([facility] 02/26/2018    Acute CVA (cerebrovascular accident) ([facility] 02/10/2018     Overview Note:     2/10/2018      Transient cerebral ischemia     TIA (transient ischemic attack) 02/09/2018       Social History     Socioeconomic History    Marital status:      Spouse name: None    Number of children: None    Years of education: None    Highest education level: None   Occupational History    None   Tobacco Use    Smoking status: Never Smoker    Smokeless tobacco: Never Used   Vaping Use    Vaping Use: Never used   Substance and Sexual Activity    Alcohol use: Not Currently     Comment: rare    Drug use: Never    Sexual activity: Never   Other Topics Concern    None   Social History Narrative    None     Social Determinants of Health     Financial Resource Strain:     Difficulty of Paying Living Expenses: Not on file   Food Insecurity:     Worried About Running Out of Food in the Last Year: Not on file    Valente of Food in the Last Year: Not on file   Transportation Needs:     Lack of Transportation (Medical): Not on file    Lack of Transportation (Non-Medical):  Not on file   Physical Activity:     Days of Exercise per Week: Not on file    mouth 2 times daily 100 tablet 5    senna-docusate (PERICOLACE) 8.6-50 MG per tablet Take 1 tablet by mouth daily (Patient not taking: Reported on 2/1/2022)      ondansetron (ZOFRAN) 8 MG tablet Take 8 mg by mouth every 8 hours as needed for Nausea or Vomiting States she has not needed to take recently. (Patient not taking: Reported on 2/1/2022)       No current facility-administered medications for this visit. Allergies   Allergen Reactions    Adhesive Tape Rash       Chief Complaint:  Laura Orellana is here today for follow up and management/recomendations for Paroxysmal mitral fibrillation     History of Present Illness: Laura Orellana states that She does house work, goes up the stairs. She denies any chest discomfort, dyspnea on exertion, orthopnea/PND. She has chronic lower extremity edema that she states has not changed for many years. She states that her legs have been swollen \"my whole life\". She denies any presyncopal symptoms. REVIEW OF SYSTEMS:  As above. Patient does not complain of any fever, chills, nausea, vomiting or diarrhea. No focal, motor or neurological deficits. No changes in his/her vision, hearing, bowel or bladder habits. She is not known to have a history of thyroid problems. No recent nose bleeds. PHYSICAL EXAM:  Vitals:    02/01/22 0754   BP: 132/78   Pulse: 66   Resp: 18   Weight: 155 lb (70.3 kg)   Height: 5' 8\" (1.727 m)       GENERAL:  She is alert and oriented x 3, communicates well, in no distress. NECK:  No masses, trachea is mid position. Supple, full ROM, no JVD or bruits. No palpable thyromegaly or lymphadenopathy. HEART:  regular rate and rhythm. Normal S1 and S2. There is a I-II/VI systolic murmur. LUNGS:  Clear to auscultation bilaterally. Poor air movement. No use of accessory muscles. symmetrical excursion. ABDOMEN:  Soft, non-tender. Normal bowel sounds. EXTREMITIES:  Full ROM x 4. Moderate bilateral lower extremity edema. Good distal pulses. EYES:  Extraocular muscles intact. PERRL. Normal lids & conjunctiva. ENT:  Nares are clear & not bleeding. Moist mucosa. Normal lips formation. No external masses   NEURO: no tremors, full ROM x 4, EOMI. SKIN:  Warm, dry and intact. Normal turgor. EKG: Sinus rhythm, 66 bpm, nl axis, nonspecific ST - T wave changes. Assessment:   1. Acute CVA 2-8-18. Hypertension. Not well controlled today. 2. Paroxysmal atrial fibrillation. Maintaining sinus today. 3. Breast cancer, undergoing chemotherapy. 4. CVA 2-18  5. Chronic lower extremity edema. Unchanged as above  6. Systolic murmur is unchanged. No valvular disease has been seen on her prior echoes. Recommendations:  1. Continue her current cardiac medications. 2. Serial echoes have been performed during her chemotherapy. Her ejection fraction remains normal.      Thank you for allowing me to participate in your patient's care.       [address], [address] [facility]  Interventional Cardiology

## 2022-02-16 RX ORDER — METOPROLOL SUCCINATE 100 MG/1
TABLET, EXTENDED RELEASE ORAL
Qty: 60 TABLET | Refills: 11 | Status: SHIPPED | OUTPATIENT
Start: 2022-02-16

## 2022-04-07 ENCOUNTER — HOSPITAL ENCOUNTER (OUTPATIENT)
Dept: RADIATION ONCOLOGY | Age: 67
Discharge: HOME | End: 2022-04-07
Attending: RADIOLOGY
Payer: COMMERCIAL

## 2022-04-07 VITALS
RESPIRATION RATE: 18 BRPM | TEMPERATURE: 98.1 F | BODY MASS INDEX: 24.74 KG/M2 | DIASTOLIC BLOOD PRESSURE: 80 MMHG | OXYGEN SATURATION: 97 % | SYSTOLIC BLOOD PRESSURE: 128 MMHG | WEIGHT: 162.7 LBS | HEART RATE: 104 BPM

## 2022-04-07 DIAGNOSIS — C50.912 MALIGNANT NEOPLASM OF LEFT BREAST IN FEMALE, ESTROGEN RECEPTOR POSITIVE, UNSPECIFIED SITE OF BREAST (HCC): Primary | ICD-10-CM

## 2022-04-07 DIAGNOSIS — R53.83 FATIGUE, UNSPECIFIED TYPE: ICD-10-CM

## 2022-04-07 DIAGNOSIS — Z17.0 MALIGNANT NEOPLASM OF LEFT BREAST IN FEMALE, ESTROGEN RECEPTOR POSITIVE, UNSPECIFIED SITE OF BREAST (HCC): Primary | ICD-10-CM

## 2022-04-07 PROCEDURE — 99213 OFFICE O/P EST LOW 20 MIN: CPT

## 2022-04-07 PROCEDURE — 99213 OFFICE O/P EST LOW 20 MIN: CPT | Performed by: NURSE PRACTITIONER

## 2022-04-07 NOTE — ADDENDUM NOTE
Encounter addended by: Rebeca Pink RN on: 4/7/2022 1:19 PM   Actions taken: Charge Capture section accepted

## 2022-04-07 NOTE — PROGRESS NOTES
Radiation Oncology   Follow Up Note      4/7/2022    Maria Fernanda Holland  Vitals:    04/07/22 0844   BP: 128/80   Pulse: 104   Resp: 18   Temp: 98.1 °F (36.7 °C)   TempSrc: Skin   SpO2: 97%   Weight: 162 lb 11.2 oz (73.8 kg)     Wt Readings from Last 3 Encounters:   04/07/22 162 lb 11.2 oz (73.8 kg)   02/01/22 155 lb (70.3 kg)   10/07/21 143 lb 7 oz (65.1 kg)         CC: Patient is here for follow up for post-radiation completion. HPI:  Izabel Hunter is a pleasant 78 y.o. female with a diagnosis of left breast cancer (ER+, MS-, Her2+), s/p NACT, BCS and adjuvant XRT. The patient underwent a course of radiation therapy to the left breast and regional lymph nodes, which was completed on 8/27/21. She completed 5000 cGy in 25 fractions. States that she is doing well. Completes monthly self breast exams. Denies any new lumps/ bumps or discharge from the nipple. Mammogram completed 3/8/22, see results below. Pt is following with Dr Howard Paulino for medical oncology. Started on anastrozole and tolerating well. Denies any hot flashes, joint aches/ pains, or mood changes. Completed Herceptin. Next appt later this month.     Past Medical History:   Diagnosis Date    Acute CVA (cerebrovascular accident) ([facility] 02/10/2018    SARAN (acute kidney injury) ([facility] 12/03/2020    Atrial fibrillation with RVR ([facility] 12/04/2020    Breast CA ([facility] 12/03/2020    C. difficile diarrhea 01/01/2021    Cancer ([facility] 11/2020    Breast-left and lymph nodes     Chemotherapy adverse reaction 12/03/2020    GI bleed 12/29/2020    History of 2019 novel coronavirus disease (COVID-19) 12/03/2000    History of blood transfusion     Hx of blood clots     Hyperlipidemia     Hypertension     Leukocytosis 12/03/2020    Osteoarthritis     PAF (paroxysmal atrial fibrillation) ([facility] 02/26/2018    [facility] to [facility] 12/04/2020    Stroke due to embolism of right posterior cerebral artery ([facility] 02/10/2018    TIA (transient ischemic attack) 02/09/2018    Transient cerebral ischemia          Past Surgical History:   Procedure Laterality Date    CERVIX SURGERY      COLONOSCOPY  2010    Negative findings    SAUL US PERQ DEVICE SOFT TISSUE PLMT  FIRST LESION  09/30/2020    SAUL US PERQ DEVICE SOFT TISSUE PLMT  FIRST LESION 9/30/2020 [facility]    PORT SURGERY N/A 11/13/2020    POWER PORT INSERTION, RIGHT SUBCLAVIAN performed by Gianna Bender MD at [address] [place] [place] [address] N/A 12/06/2020    PORT REMOVAL performed by Gianna Bender MD at [address] [place] [address] N/A 1/15/2021    POWER PORT INSERTION performed by Gian[facility]ender MD at [facility] ECHOCARDIOGRAM  05/2018    No shunt; no vegetation    UPPER GASTROINTESTINAL ENDOSCOPY N/A 12/31/2020    EGD ESOPHAGOGASTRODUODENOSCOPY ANTRAL BIOPSY performed by Gianna Bender MD at [address] LEFT  09/30/2020    US BREAST NEEDLE BIOPSY LEFT 9/30/2020 [facility]    WISDOM TOOTH EXTRACTION           Social History     Socioeconomic History    Marital status:      Spouse name: Not on file    Number of children: Not on file    Years of education: Not on file    Highest education level: Not on file   Occupational History    Not on file   Tobacco Use    Smoking status: Never Smoker    Smokeless tobacco: Never Used   Vaping Use    Vaping Use: Never used   Substance and Sexual Activity    Alcohol use: Not Currently     Comment: rare    Drug use: Never    Sexual activity: Never   Other Topics Concern    Not on file   Social History Narrative    Not on file     Social Determinants of Health     Financial Resource Strain:     Difficulty of Paying Living Expenses: Not on file   Food Insecurity:     Worried About Running Out of Food in the Last Year: Not on file    Valente of Food in the Last Year: Not on file   Transportation Needs:     Lack of Transportation (Medical): Not on file    Lack of Transportation (Non-Medical):  Not on file   Physical Activity:     Days of Exercise per Week: Not on file    Minutes of Exercise per Session: Not on file   Stress:     Feeling of Stress : Not on file   Social Connections:     Frequency of Communication with Friends and Family: Not on file    Frequency of Social Gatherings with Friends and Family: Not on file    Attends Jainism Services: Not on file    Active Member of Clubs or Organizations: Not on file    Attends Club or Organization Meetings: Not on file    Marital Status: Not on file   Intimate Partner Violence:     Fear of Current or Ex-Partner: Not on file    Emotionally Abused: Not on file    Physically Abused: Not on file    Sexually Abused: Not on file   Housing Stability:     Unable to Pay for Housing in the Last Year: Not on file    Number of Places Lived in the Last Year: Not on file    Unstable Housing in the Last Year: Not on file         Family History   Problem Relation Age of Onset    Heart Disease Mother        Allergies:   Adhesive tape      Current Outpatient Medications   Medication Sig Dispense Refill    metoprolol succinate (TOPROL XL) 100 MG extended release tablet TAKE 1 TABLET BY MOUTH 2 TIMES DAILY TAKES ALONG WITH A 50MG FOR TOTAL OF 150MG TWICE A DAY 60 tablet 11    potassium chloride (KLOR-CON M) 20 MEQ TBCR extended release tablet TAKE 2 TABLETS BY MOUTH EVERY DAY      folic acid (FOLVITE) 1 MG tablet Take 1 mg by mouth daily      Multiple Vitamins-Minerals (THERAPEUTIC MULTIVITAMIN-MINERALS) tablet Take 1 tablet by mouth daily      anastrozole (ARIMIDEX) 1 MG tablet Take 1 mg by mouth daily      metoprolol succinate (TOPROL XL) 50 MG extended release tablet TAKE 1 TABLET BY MOUTH TWICE A DAY 60 tablet 11    lisinopril (PRINIVIL;ZESTRIL) 20 MG tablet Take 20 mg by mouth 2 times daily      magnesium oxide (MAG-OX) 400 MG tablet Take one tablet twice daily for (5) days, then one tablet daily for (7) days. 17 tablet 0    apixaban (ELIQUIS) 5 MG TABS tablet Take 5 mg by mouth 2 times daily       atorvastatin (LIPITOR) 40 MG tablet Take 1 tablet by mouth nightly 30 tablet 3    calcium carbonate (OSCAL) 500 MG TABS tablet Take 1 tablet by mouth 2 times daily 100 tablet 5     No current facility-administered medications for this encounter. REVIEW OF SYSTEMS:       Constitutional:  No fever chills or rigors.  Eyes: No changes in vision, discharge, or pain   ENT: No Headaches, hearing loss or vertigo. No mouth sores or sore throat. No change in taste or smell.  Cardiovascular: No chest discomfort, dyspnea on exertion, palpitations or loss of consciousness or phlebitis.  Respiratory: Has no cough or wheezing, Has no sputum production. Has no hemoptysis, has no pleuritic pain.  Gastrointestinal: No abdominal pain, appetite loss, blood in stools. No change in bowel habits. No hematemesis    Genitourinary: Patient acknowledges no dysuria, trouble voiding, or hematuria. No nocturia or increased frequency.  Musculoskeletal: No gait disturbance, weakness or joint complaints.  Integumentary: No rash or pruritis.  Neurological: No headache, diplopia, change in muscle strength, numbness or tingling. No change in gait, balance, coordination, mood, affect, memory, mentation, behavior.  Psychiatric: No anxiety, or depression.  Endocrine: No temperature intolerance. No excessive thirst, fluid intake, or urination. No tremor.  Hematologic/Lymphatic: No abnormal bruising or bleeding, blood clots or swollen lymph nodes.  Allergic/Immunologic: No nasal congestion or hives.             PHYSICAL EXAMINATION:   Vitals:    04/07/22 0844   BP: 128/80   Pulse: 104   Resp: 18   Temp: 98.1 °F (36.7 °C)   TempSrc: Skin   SpO2: 97%   Weight: 162 lb 11.2 oz (73.8 kg)     Constitutional: A well developed, well nourished 78 y.o. female who is alert, oriented, cooperative and in no apparent distress. Head was normocephalic and atraumatic. EENT: EOMI EZEQUIEL. MMM. Neck: Trachea was midline. Respiratory: Chest expansion was symmetrical. Breath sounds bilaterally were clear to auscultation. No wheezes, rhonchi or rales. No intercostal retraction or use of accessory muscles. Cardiovascular: Regular without murmur, clicks, gallops or rubs. Abdomen: Rounded and soft without organomegaly. No rebound, guarding or  rigidity. Lymphatic: No lymphadenopathy. Musculoskeletal: Ambulates without assistance. Normal curvature of the spine. No gross muscle weakness. Muscle size, tone and strength are normal. No involuntary movements. Extremities:  No lower extremity edema, ulcerations, tenderness, varicosities or erythema. Coordination appears adequate. Sensory function appears intact. Skin:  Warm and dry. Examination of color, turgor and pigmentation was relatively normal. No bruises or skin rashes. Old surgical scars are noted. Neurological/Psychiatric: General behavior, level of consciousness, thought content is normal. The patient's emotional status is normal.  Cranial nerves II-XII are grossly intact. Breasts: Right breast normal without mass, skin or nipple changes or axillary nodes. Left breast surgical scars noted with radiation changes but otherwise without mass, axillary nodes or nipple changes. IMAGING:    MAMMOGRAM, 3/8/22:                      ASSESSMENT/PLAN:    Left breast cancer (ER+, MT-, Her2+), s/p NACT, BCS and adjuvant  radiation therapy to the left breast and regional lymph nodes completed on 8/27/21 (5000 cGy in 25 fractions). Patient is doing well post-radiation completion. Skin care and sun care reviewed. Signs and symptoms of recurrence reviewed. Encouraged monthly self breast exams. Imaging per med onc. Mammogram completed 3/8/22, no evidence of malignancy. TSH ordered to be completed this month d/t irradiation of regional lymph nodes. Explained this to patient and verbalized understanding. Lab req given to patient to take wherever she typically gets blood drawn. Cont to follow with Dr Chacha Garcia for medical oncology. Started on anastrozole and tolerating well. Denies any hot flashes, joint aches/ pains, or mood changes. Completed Herceptin. Next appt later this month. I discussed follow up plans with Ortiz Tellez. At this time, Dr Miri Lagunas will see the patient back on a PRN basis as she is following closely with medical oncology for her cancer care. Instructed to follow up with other providers involved in their care as directed (including but not limited to Medical Oncology, Primary Care, Pulmonary, and Surgery). The patient was given our contact number in the event that if at any time they change their mind and would like to return to the clinic to see either myself or one of the Radiation Oncologists, they can simply call us and we would be happy to see them. Thank you for involving us in the management of this extremely pleasant patient. More than 25 min was in direct contact with pt coordinating/giving care. >50% of the visit was spent in counseling the pt on the following: Follow up care    The nurses notes were reviewed and incorporated into this assessment and plan.           Sincerely,    Rasta Landers, MSN, RN, APRN-CNP  Nurse Practitioner for Radiation Oncology    PHYSICIANS [facility]) [facility]: [phone] ([facility]: [phone])  [address] [place] [address]: [phone] ([facility]: [phone])  [facility]:  [phone] ([phone])

## 2022-04-07 NOTE — PROGRESS NOTES
Everardo Fry  4/7/2022  8:58 AM      Vitals:    04/07/22 0844   BP: 128/80   Pulse: 104   Resp: 18   Temp: 98.1 °F (36.7 °C)   SpO2: 97%    : Wt Readings from Last 3 Encounters:   04/07/22 162 lb 11.2 oz (73.8 kg)   02/01/22 155 lb (70.3 kg)   10/07/21 143 lb 7 oz (65.1 kg)                Current Outpatient Medications:     metoprolol succinate (TOPROL XL) 100 MG extended release tablet, TAKE 1 TABLET BY MOUTH 2 TIMES DAILY TAKES ALONG WITH A 50MG FOR TOTAL OF 150MG TWICE A DAY, Disp: 60 tablet, Rfl: 11    potassium chloride (KLOR-CON M) 20 MEQ TBCR extended release tablet, TAKE 2 TABLETS BY MOUTH EVERY DAY, Disp: , Rfl:     folic acid (FOLVITE) 1 MG tablet, Take 1 mg by mouth daily, Disp: , Rfl:     Multiple Vitamins-Minerals (THERAPEUTIC MULTIVITAMIN-MINERALS) tablet, Take 1 tablet by mouth daily, Disp: , Rfl:     anastrozole (ARIMIDEX) 1 MG tablet, Take 1 mg by mouth daily, Disp: , Rfl:     metoprolol succinate (TOPROL XL) 50 MG extended release tablet, TAKE 1 TABLET BY MOUTH TWICE A DAY, Disp: 60 tablet, Rfl: 11    lisinopril (PRINIVIL;ZESTRIL) 20 MG tablet, Take 20 mg by mouth 2 times daily, Disp: , Rfl:     magnesium oxide (MAG-OX) 400 MG tablet, Take one tablet twice daily for (5) days, then one tablet daily for (7) days. , Disp: 17 tablet, Rfl: 0    apixaban (ELIQUIS) 5 MG TABS tablet, Take 5 mg by mouth 2 times daily , Disp: , Rfl:     atorvastatin (LIPITOR) 40 MG tablet, Take 1 tablet by mouth nightly, Disp: 30 tablet, Rfl: 3    calcium carbonate (OSCAL) 500 MG TABS tablet, Take 1 tablet by mouth 2 times daily, Disp: 100 tablet, Rfl: 5      Patient is seen today in follow up with Austen VELASCO. She received CTV left breast with regional LN's 7/26/21-8/27/21 25fx/5000cGY and tolerted  Well. She follows with Dr Corbett Kussmaul for medical oncology and her routine mammogram was done on 3/8/22.         FALLS RISK SCREENING ASSESSMENT    Instructions:  Assess the patient and enter the appropriate indicators that are present for fall risk identification. Total the numbers entered and assign a fall risk score from Table 2.  Reassess patient at a minimum every 12 weeks or with status change. Assessment   Date  4/7/2022     1. Mental Ability: confusion/cognitively impaired No - 0       2. Elimination Issues: incontinence, frequency No - 0       3. Ambulatory: use of assistive devices (walker, cane, off-loading devices), attached to equipment (IV pole, oxygen) No - 0     4. Sensory Limitations: dizziness, vertigo, impaired vision No - 0       5. Age 72 years or greater - 1       10. Medication: diuretics, strong analgesics, hypnotics, sedatives, antihypertensive agents   Yes - 3   7. Falls:  recent history of falls within the last 3 months (not to include slipping or tripping)   No - 0   TOTAL 4    If score of 4 or greater was education given? Yes       TABLE 2   Risk Score Risk Level Plan of Care   0-3 Little or  No Risk 1. Provide assistance as indicated for ambulation activities  2. Reorient confused/cognitively impaired patient  3. Call-light/bell within patient's reach  4. Chair/bed in low position, stretcher/bed with siderails up except when performing patient care activities  5. Educate patient/family/caregiver on falls prevention  6.  Reassess in 12 weeks or with any noted change in patient condition which places them at a risk for a fall   4-6 Moderate Risk 1. Provide assistance as indicated for ambulation activities  2. Reorient confused/cognitively impaired patient  3. Call-light/bell within patient's reach  4. Chair/bed in low position, stretcher/bed with siderails up except when performing patient care activities  5. Educate patient/family/caregiver on falls prevention  6. Falls risk precaution (Yellow sticker Level II) placed on patient chart   7 or   Higher High Risk 1. Place patient in easily observable treatment room  2. Patient attended at all times by family member or staff  3. Provide assistance as indicated for ambulation activities  4. Reorient confused/cognitively impaired patient  5. Call-light/bell within patient's reach  6. Chair/bed in low position, stretcher/bed with siderails up except when performing patient care activities  7. Educate patient/family/caregiver on falls prevention  8. Falls risk precaution (Yellow sticker Level III) placed on patient chart           MALNUTRITION RISK SCREENING ASSESSMENT    4/7/2022   Patient:  Alber Hooper  Sex:  female    Instructions:  Assess the patient and enter the appropriate indicators that are present for nutrition risk identification. Total the numbers entered and assign a risk score. Follow the appropriate action for total score listed below. Assessment   Date  4/7/2022     1. Have you lost weight without trying? 0- No     2. Have you been eating poorly because of a decreased appetite? 0- No   3. Do you have a diagnosis of head and neck cancer?       0- No                                                                                    TOTAL 0          Score of 0-1: No action  Score 2 or greater:  · For Non-Diabetic Patient: Recommend adding Ensure Complete 2 x daily and provide patient with Ensure wellness bag with coupons  · For Diabetic Patient: Recommend adding Glucerna Shake 2 x daily and provide patient with Glucerna Wellness bag with coupons  · Route to the dietitian via Madeleine Shetty RN

## 2022-04-19 RX ORDER — METOPROLOL SUCCINATE 50 MG/1
TABLET, EXTENDED RELEASE ORAL
Qty: 60 TABLET | Refills: 11 | Status: SHIPPED | OUTPATIENT
Start: 2022-04-19

## 2022-04-25 NOTE — PROGRESS NOTES
Have you been tested for COVID  No           Have you been told you were positive for COVID Yes  12/3/2020  Have you had any known exposure to someone that is positive for COVID No  Do you have a cough                   No              Do you have shortness of breath No                 Do you have a sore throat            No                Are you having chills                    No                Are you having muscle aches. No                    Please come to the hospital wearing a mask and have your significant other wear a mask as well. Both of you should check your temperature before leaving to come here,  if it is 100 or higher please call [phone] for instruction.

## 2022-04-25 NOTE — PROGRESS NOTES
[facility] PRE-ADMISSION TESTING INSTRUCTIONS    The Preadmission Testing patient is instructed accordingly using the following criteria (check applicable):    ARRIVAL INSTRUCTIONS:  [x] Parking the day of Surgery is located in the Main Entrance lot. Upon entering the door, make an immediate right to the surgery reception desk    [x] Bring photo ID and insurance card    [] Bring in a copy of Living will or Durable Power of  papers. [x] Please be sure to arrange for responsible adult to provide transportation to and from the hospital    [x] Please arrange for responsible adult to be with you for the 24 hour period post procedure due to having anesthesia      GENERAL INSTRUCTIONS:    [x] Nothing by mouth after midnight, including gum, candy, mints or water    [x] You may brush your teeth, but do not swallow any water    [x] Take medications as instructed with 1-2 oz of water    [x] Stop herbal supplements and vitamins 5 days prior to procedure    [x] Follow preop dosing of blood thinners per physician instructions    [] Take 1/2 dose of evening insulin, but no insulin after midnight    [] No oral diabetic medications after midnight    [] If diabetic and have low blood sugar or feel symptomatic, take 1-2oz apple juice only    [] Bring inhalers day of surgery    [] Bring C-PAP/ Bi-Pap day of surgery    [] Bring urine specimen day of surgery    [x] Shower or bath with soap, lather and rinse well, AM of Surgery, no lotion, powders or creams     [] Follow bowel prep as instructed per surgeon    [x] No tobacco products within 24 hours of surgery     [x] No alcohol or illegal drug use within 24 hours of surgery.     [x] Jewelry, body piercing's, eyeglasses, contact lenses and dentures are not permitted into surgery (bring cases)      [x] Please do not wear any nail polish, make up or hair products on the day of surgery    [x] You can expect a call the business day prior to procedure to notify you if your arrival time changes    [x] If you receive a survey after surgery we would greatly appreciate your comments    [] Parent/guardian of a minor must accompany their child and remain on the premises  the entire time they are under our care     [] Pediatric patients may bring favorite toy, blanket or comfort item with them    [] A caregiver or family member must remain with the patient during their stay if they are mentally handicapped, have dementia, disoriented or unable to use a call light or would be a safety concern if left unattended    [x] Please notify surgeon if you develop any illness between now and time of surgery (cold, cough, sore throat, fever, nausea, vomiting) or any signs of infections  including skin, wounds, and dental.    [x]  The Outpatient Pharmacy is available to fill your prescription here on your day of surgery, ask your preop nurse for details    [x] Other instructions: wear loose, comfortable clothing  EDUCATIONAL MATERIALS PROVIDED:    [] PAT Preoperative Education Packet/Booklet     [] Medication List    [] Transfusion bracelet applied with instructions    [] Shower with soap, lather and rinse well, and use CHG wipes provided the evening before surgery as instructed    [] Incentive spirometer with instructions

## 2022-04-26 ENCOUNTER — PREP FOR PROCEDURE (OUTPATIENT)
Dept: SURGERY | Age: 67
End: 2022-04-26

## 2022-04-26 RX ORDER — SODIUM CHLORIDE 0.9 % (FLUSH) 0.9 %
5-40 SYRINGE (ML) INJECTION EVERY 12 HOURS SCHEDULED
Status: CANCELLED | OUTPATIENT
Start: 2022-04-26

## 2022-04-26 RX ORDER — SODIUM CHLORIDE, SODIUM LACTATE, POTASSIUM CHLORIDE, CALCIUM CHLORIDE 600; 310; 30; 20 MG/100ML; MG/100ML; MG/100ML; MG/100ML
INJECTION, SOLUTION INTRAVENOUS CONTINUOUS
Status: CANCELLED | OUTPATIENT
Start: 2022-04-26

## 2022-04-26 RX ORDER — SODIUM CHLORIDE 0.9 % (FLUSH) 0.9 %
5-40 SYRINGE (ML) INJECTION PRN
Status: CANCELLED | OUTPATIENT
Start: 2022-04-26

## 2022-04-26 NOTE — H&P (VIEW-ONLY)
Name: Gilson Eddy             : 1955 Sex: F  Age: 73 yrs  Acct#:  [id number]            CC: Routine postprocedure visit    HPI: [Overall, the patient is doing well. The patient denies any symptomatology. The patient underwent a left lumpectomy with sentinel node on 2021. The patient states that her incisions are well approximated. The patient had a complete clinical and pathologic response.]    Meds Prior to Visit:  Sulfamethoxazole-Trimethoprim  400-80 mg  1 by mouth twice a day  Atorvastatin Calcium     Calcium Carb-Cholecalciferol     Eliquis     Lisinopril     Metoprolol Succinate ER        Allergies:  NKDA        Wt Prior: 144lb as of 21    Exam:    The breast incision is well healed without evidence of infection. No recurrent disease is noted to involve the breast or axilla. 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 Presently, the patient has no evidence of recurrent disease. Again, the patient had a complete pathologic and clinical response. Moderate complexity    I performed an updated history, physical examination, assessment and plan. 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

## 2022-04-26 NOTE — H&P
Name: Sebastien Mackey             : 1955 Sex: F  Age: 73 yrs  Acct#:  [id number]            CC: Routine postprocedure visit    HPI: [Overall, the patient is doing well. The patient denies any symptomatology. The patient underwent a left lumpectomy with sentinel node on 2021. The patient states that her incisions are well approximated. The patient had a complete clinical and pathologic response.]    Meds Prior to Visit:  Sulfamethoxazole-Trimethoprim  400-80 mg  1 by mouth twice a day  Atorvastatin Calcium     Calcium Carb-Cholecalciferol     Eliquis     Lisinopril     Metoprolol Succinate ER        Allergies:  NKDA        Wt Prior: 144lb as of 21    Exam:    The breast incision is well healed without evidence of infection. No recurrent disease is noted to involve the breast or axilla. 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 Presently, the patient has no evidence of recurrent disease. Again, the patient had a complete pathologic and clinical response. Moderate complexity    I performed an updated history, physical examination, assessment and plan. 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

## 2022-04-28 ENCOUNTER — ANESTHESIA EVENT (OUTPATIENT)
Dept: OPERATING ROOM | Age: 67
End: 2022-04-28
Payer: COMMERCIAL

## 2022-04-29 ENCOUNTER — HOSPITAL ENCOUNTER (OUTPATIENT)
Age: 67
Setting detail: OUTPATIENT SURGERY
Discharge: HOME | End: 2022-04-29
Attending: SURGERY | Admitting: SURGERY
Payer: COMMERCIAL

## 2022-04-29 ENCOUNTER — ANESTHESIA (OUTPATIENT)
Dept: OPERATING ROOM | Age: 67
End: 2022-04-29
Payer: COMMERCIAL

## 2022-04-29 VITALS — SYSTOLIC BLOOD PRESSURE: 105 MMHG | DIASTOLIC BLOOD PRESSURE: 57 MMHG | OXYGEN SATURATION: 100 % | TEMPERATURE: 96.6 F

## 2022-04-29 VITALS
OXYGEN SATURATION: 96 % | DIASTOLIC BLOOD PRESSURE: 70 MMHG | WEIGHT: 162 LBS | BODY MASS INDEX: 24.55 KG/M2 | HEIGHT: 68 IN | SYSTOLIC BLOOD PRESSURE: 117 MMHG | HEART RATE: 80 BPM | RESPIRATION RATE: 16 BRPM | TEMPERATURE: 97.4 F

## 2022-04-29 PROCEDURE — 3700000001 HC ADD 15 MINUTES (ANESTHESIA): Performed by: SURGERY

## 2022-04-29 PROCEDURE — 7100000010 HC PHASE II RECOVERY - FIRST 15 MIN: Performed by: SURGERY

## 2022-04-29 PROCEDURE — 2500000003 HC RX 250 WO HCPCS: Performed by: SURGERY

## 2022-04-29 PROCEDURE — 3600000012 HC SURGERY LEVEL 2 ADDTL 15MIN: Performed by: SURGERY

## 2022-04-29 PROCEDURE — 2709999900 HC NON-CHARGEABLE SUPPLY: Performed by: SURGERY

## 2022-04-29 PROCEDURE — 2580000003 HC RX 258: Performed by: ANESTHESIOLOGY

## 2022-04-29 PROCEDURE — 7100000011 HC PHASE II RECOVERY - ADDTL 15 MIN: Performed by: SURGERY

## 2022-04-29 PROCEDURE — 3700000000 HC ANESTHESIA ATTENDED CARE: Performed by: SURGERY

## 2022-04-29 PROCEDURE — 6360000002 HC RX W HCPCS: Performed by: NURSE ANESTHETIST, CERTIFIED REGISTERED

## 2022-04-29 PROCEDURE — 88300 SURGICAL PATH GROSS: CPT

## 2022-04-29 PROCEDURE — 3600000002 HC SURGERY LEVEL 2 BASE: Performed by: SURGERY

## 2022-04-29 RX ORDER — FENTANYL CITRATE 50 UG/ML
INJECTION, SOLUTION INTRAMUSCULAR; INTRAVENOUS PRN
Status: DISCONTINUED | OUTPATIENT
Start: 2022-04-29 | End: 2022-04-29 | Stop reason: SDUPTHER

## 2022-04-29 RX ORDER — SODIUM CHLORIDE, SODIUM LACTATE, POTASSIUM CHLORIDE, CALCIUM CHLORIDE 600; 310; 30; 20 MG/100ML; MG/100ML; MG/100ML; MG/100ML
INJECTION, SOLUTION INTRAVENOUS CONTINUOUS
Status: DISCONTINUED | OUTPATIENT
Start: 2022-04-29 | End: 2022-04-29 | Stop reason: HOSPADM

## 2022-04-29 RX ORDER — PROPOFOL 10 MG/ML
INJECTION, EMULSION INTRAVENOUS PRN
Status: DISCONTINUED | OUTPATIENT
Start: 2022-04-29 | End: 2022-04-29 | Stop reason: SDUPTHER

## 2022-04-29 RX ORDER — ONDANSETRON 2 MG/ML
4 INJECTION INTRAMUSCULAR; INTRAVENOUS
Status: DISCONTINUED | OUTPATIENT
Start: 2022-04-29 | End: 2022-04-29 | Stop reason: HOSPADM

## 2022-04-29 RX ORDER — SODIUM CHLORIDE 9 MG/ML
INJECTION, SOLUTION INTRAVENOUS PRN
Status: DISCONTINUED | OUTPATIENT
Start: 2022-04-29 | End: 2022-04-29 | Stop reason: HOSPADM

## 2022-04-29 RX ORDER — SODIUM CHLORIDE 0.9 % (FLUSH) 0.9 %
5-40 SYRINGE (ML) INJECTION EVERY 12 HOURS SCHEDULED
Status: DISCONTINUED | OUTPATIENT
Start: 2022-04-29 | End: 2022-04-29 | Stop reason: HOSPADM

## 2022-04-29 RX ORDER — FENTANYL CITRATE 50 UG/ML
50 INJECTION, SOLUTION INTRAMUSCULAR; INTRAVENOUS EVERY 5 MIN PRN
Status: DISCONTINUED | OUTPATIENT
Start: 2022-04-29 | End: 2022-04-29 | Stop reason: HOSPADM

## 2022-04-29 RX ORDER — SODIUM CHLORIDE 0.9 % (FLUSH) 0.9 %
5-40 SYRINGE (ML) INJECTION PRN
Status: DISCONTINUED | OUTPATIENT
Start: 2022-04-29 | End: 2022-04-29 | Stop reason: HOSPADM

## 2022-04-29 RX ORDER — FENTANYL CITRATE 50 UG/ML
25 INJECTION, SOLUTION INTRAMUSCULAR; INTRAVENOUS EVERY 5 MIN PRN
Status: DISCONTINUED | OUTPATIENT
Start: 2022-04-29 | End: 2022-04-29 | Stop reason: HOSPADM

## 2022-04-29 RX ORDER — MIDAZOLAM HYDROCHLORIDE 1 MG/ML
INJECTION INTRAMUSCULAR; INTRAVENOUS PRN
Status: DISCONTINUED | OUTPATIENT
Start: 2022-04-29 | End: 2022-04-29 | Stop reason: SDUPTHER

## 2022-04-29 RX ORDER — LIDOCAINE HYDROCHLORIDE 10 MG/ML
INJECTION, SOLUTION INFILTRATION; PERINEURAL PRN
Status: DISCONTINUED | OUTPATIENT
Start: 2022-04-29 | End: 2022-04-29 | Stop reason: ALTCHOICE

## 2022-04-29 RX ORDER — DEXAMETHASONE SODIUM PHOSPHATE 4 MG/ML
INJECTION, SOLUTION INTRA-ARTICULAR; INTRALESIONAL; INTRAMUSCULAR; INTRAVENOUS; SOFT TISSUE PRN
Status: DISCONTINUED | OUTPATIENT
Start: 2022-04-29 | End: 2022-04-29 | Stop reason: SDUPTHER

## 2022-04-29 RX ORDER — CEFAZOLIN SODIUM 1 G/3ML
INJECTION, POWDER, FOR SOLUTION INTRAMUSCULAR; INTRAVENOUS PRN
Status: DISCONTINUED | OUTPATIENT
Start: 2022-04-29 | End: 2022-04-29 | Stop reason: SDUPTHER

## 2022-04-29 RX ORDER — ONDANSETRON 2 MG/ML
INJECTION INTRAMUSCULAR; INTRAVENOUS PRN
Status: DISCONTINUED | OUTPATIENT
Start: 2022-04-29 | End: 2022-04-29 | Stop reason: SDUPTHER

## 2022-04-29 RX ADMIN — PROPOFOL 50 MG: 10 INJECTION, EMULSION INTRAVENOUS at 08:45

## 2022-04-29 RX ADMIN — MIDAZOLAM 2 MG: 1 INJECTION INTRAMUSCULAR; INTRAVENOUS at 08:45

## 2022-04-29 RX ADMIN — ONDANSETRON 4 MG: 2 INJECTION INTRAMUSCULAR; INTRAVENOUS at 08:45

## 2022-04-29 RX ADMIN — SODIUM CHLORIDE: 9 INJECTION, SOLUTION INTRAVENOUS at 07:30

## 2022-04-29 RX ADMIN — CEFAZOLIN 1000 MG: 1 INJECTION, POWDER, FOR SOLUTION INTRAMUSCULAR; INTRAVENOUS at 08:53

## 2022-04-29 RX ADMIN — DEXAMETHASONE SODIUM PHOSPHATE 10 MG: 4 INJECTION INTRA-ARTICULAR; INTRALESIONAL; INTRAMUSCULAR; INTRAVENOUS; SOFT TISSUE at 08:45

## 2022-04-29 RX ADMIN — FENTANYL CITRATE 50 MCG: 50 INJECTION, SOLUTION INTRAMUSCULAR; INTRAVENOUS at 08:46

## 2022-04-29 RX ADMIN — PROPOFOL 50 MG: 10 INJECTION, EMULSION INTRAVENOUS at 08:58

## 2022-04-29 ASSESSMENT — PAIN - FUNCTIONAL ASSESSMENT: PAIN_FUNCTIONAL_ASSESSMENT: NONE - DENIES PAIN

## 2022-04-29 NOTE — ANESTHESIA POSTPROCEDURE EVALUATION
Department of Anesthesiology  Postprocedure Note    Patient: Monique Larson  MRN: [id number]  YOB: 1955  Date of evaluation: 4/29/2022  Time:  9:18 AM     Procedure Summary     Date: 04/29/22 Room / Location: [facility] / [facility]    Anesthesia Start: 1648 Anesthesia Stop:     Procedure: MEDIPORT REMOVAL (N/A Chest) Diagnosis: (BREAST CANCER)    Surgeons: Latisha Osei MD Responsible Provider: Petr Tripp DO    Anesthesia Type: Not recorded ASA Status: 3          Anesthesia Type: No value filed. Esteban Phase I: Esteban Score: 10    Esteban Phase II:      Last vitals: Reviewed and per EMR flowsheets.        Anesthesia Post Evaluation

## 2022-04-29 NOTE — ANESTHESIA PRE PROCEDURE
Department of Anesthesiology  Preprocedure Note       Name:  Aravind Lyons   Age:  78 y.o.  :  1955                                          MRN:  [id number]         Date:  2022      Surgeon: Roxy Ruffin):  Nora Farfan MD    Procedure: Procedure(s): MEDIPORT REMOVAL    Medications prior to admission:   Prior to Admission medications    Medication Sig Start Date End Date Taking? Authorizing Provider   metoprolol succinate (TOPROL XL) 50 MG extended release tablet TAKE 1 TABLET BY MOUTH TWICE A DAY 22   Eamon Block MD   metoprolol succinate (TOPROL XL) 100 MG extended release tablet TAKE 1 TABLET BY MOUTH 2 TIMES DAILY TAKES ALONG WITH A 50MG FOR TOTAL OF 150MG TWICE A DAY 22   Lian Bloom DO   potassium chloride (KLOR-CON M) 20 MEQ TBCR extended release tablet TAKE 2 TABLETS BY MOUTH EVERY DAY 22   Historical Provider, MD   folic acid (FOLVITE) 1 MG tablet Take 1 mg by mouth daily    Historical Provider, MD   Multiple Vitamins-Minerals (THERAPEUTIC MULTIVITAMIN-MINERALS) tablet Take 1 tablet by mouth daily    Historical Provider, MD   anastrozole (ARIMIDEX) 1 MG tablet Take 1 mg by mouth daily    Historical Provider, MD   lisinopril (PRINIVIL;ZESTRIL) 20 MG tablet Take 20 mg by mouth 2 times daily    Historical Provider, MD   magnesium oxide (MAG-OX) 400 MG tablet Take one tablet twice daily for (5) days, then one tablet daily for (7) days.  20   Lian Bloom DO   apixaban (ELIQUIS) 5 MG TABS tablet Take 5 mg by mouth 2 times daily     Historical Provider, MD   atorvastatin (LIPITOR) 40 MG tablet Take 1 tablet by mouth nightly 18   Jennifer Vickers MD   calcium carbonate (OSCAL) 500 MG TABS tablet Take 1 tablet by mouth 2 times daily 18   Jennifer Vickers MD       Current medications:    Current Facility-Administered Medications   Medication Dose Route Frequency Provider Last Rate Last Admin    lactated ringers infusion   IntraVENous Continuous Piedad Lombard, MD        sodium chloride flush 0.9 % injection 5-40 mL  5-40 mL IntraVENous 2 times per day Piedad Lombard, MD        sodium chloride flush 0.9 % injection 5-40 mL  5-40 mL IntraVENous PRN Piedad Lombard, MD        sodium chloride flush 0.9 % injection 5-40 mL  5-40 mL IntraVENous 2 times per day Glennette Gamma, DO        sodium chloride flush 0.9 % injection 5-40 mL  5-40 mL IntraVENous PRN Glennette Gamma, DO        0.9 % sodium chloride infusion   IntraVENous PRN Roseline Gamma, DO   New Bag at 04/29/22 0730    fentaNYL (SUBLIMAZE) injection 25 mcg  25 mcg IntraVENous Q5 Min PRN Linda Caller Erlen, DO        fentaNYL (SUBLIMAZE) injection 50 mcg  50 mcg IntraVENous Q5 Min PRN Linda Caller Erlen, DO        ondansetron [facility]) injection 4 mg  4 mg IntraVENous Once PRN Roseline Gamma, DO        lidocaine 1 % injection    PRN Piedad Lombard, MD   10 mL at 04/29/22 1012     Facility-Administered Medications Ordered in Other Encounters   Medication Dose Route Frequency Provider Last Rate Last Admin    fentaNYL (SUBLIMAZE) injection   IntraVENous PRN Reba Aminata, APRN - CRNA   50 mcg at 04/29/22 4047    midazolam (VERSED) injection   IntraVENous PRN Reba Gabrielk, APRN - CRNA   2 mg at 04/29/22 0845    propofol injection   IntraVENous PRN Reba Aminata, APRN - CRNA   50 mg at 04/29/22 0858    ondansetron (ZOFRAN) injection   IntraVENous PRN Reba Aminata, APRN - CRNA   4 mg at 04/29/22 0845    ceFAZolin (ANCEF) injection   IntraVENous PRN Reba Gabrielk, APRN - CRNA   1,000 mg at 04/29/22 8670    Dexamethasone Sodium Phosphate injection   IntraVENous PRN Reba Gabrielk, APRN - CRNA   10 mg at 04/29/22 0845       Allergies:     Allergies   Allergen Reactions    Adhesive Tape Rash       Problem List:    Patient Active Problem List   Diagnosis Code    TIA (transient ischemic attack) G45.9    Acute CVA (cerebrovascular accident) ([facility] [address] I63.9    Transient cerebral ischemia G45.9    PAF (paroxysmal atrial fibrillation) (HCC) I48.0    Breast CA (HCC) C50.919    SARAN (acute kidney injury) ([address] N17.9    Chemotherapy adverse reaction T45.1X5A    Leukocytosis D72.829    Septic shock with acute organ dysfunction due to Gram positive cocci (HCC) A41.89, R65.21    COVID-19 U07.1    Atrial fibrillation with RVR (HCC) I48.91    GI bleed K92.2    History of 2019 novel coronavirus disease (COVID-19) Z86.16    C. difficile diarrhea A04.72    Hypertension I10    Stroke due to embolism of right posterior cerebral artery (HCC) I63.431       Past Medical History:        Diagnosis Date    Acute CVA (cerebrovascular accident) ([address] 02/10/2018    SARAN (acute kidney injury) ([address] 12/03/2020    Atrial fibrillation with RVR ([address] 12/04/2020    Breast CA ([facility] 12/03/2020    C. difficile diarrhea 01/01/2021    resolved    Cancer ([address] 11/2020    Breast-left and lymph nodes     Chemotherapy adverse reaction 12/03/2020    GI bleed 12/29/2020    resolved    History of 2019 novel coronavirus disease (COVID-19) 12/03/2000    History of blood transfusion 2020     4 units    Hx of blood clots     Hyperlipidemia     Hypertension     Leukocytosis 12/03/2020    Osteoarthritis     PAF (paroxysmal atrial fibrillation) ([address] 02/26/2018    Septic shock with acute organ dysfunction due to Gram positive cocci ([address] 12/04/2020    Stroke due to embolism of right posterior cerebral artery ([address] 02/10/2018    TIA (transient ischemic attack) 02/09/2018    Transient cerebral ischemia        Past Surgical History:        Procedure Laterality Date    CERVIX SURGERY      COLONOSCOPY  2010    Negative findings    SAUL US PERQ DEVICE SOFT TISSUE PLMT  FIRST LESION  09/30/2020    SAUL US PERQ DEVICE SOFT TISSUE PLMT  FIRST LESION 9/30/2020 [facility]    PORT SURGERY N/A 11/13/2020    POWER PORT INSERTION, RIGHT SUBCLAVIAN performed by Florina Linares MD at [facility] SURGERY N/A 12/06/2020    PORT REMOVAL performed by Shelley Veronica MD at [facility] N/A 1/15/2021    POWER PORT INSERTION performed by Shelley Veronica MD at [facility]  05/2018    No shunt; no vegetation    UPPER GASTROINTESTINAL ENDOSCOPY N/A 12/31/2020    EGD ESOPHAGOGASTRODUODENOSCOPY ANTRAL BIOPSY performed by Shelley Veronica MD at [address] LEFT  09/30/2020    US BREAST NEEDLE BIOPSY LEFT 9/30/2020 [facility]    WISDOM TOOTH EXTRACTION         Social History:    Social History     Tobacco Use    Smoking status: Never Smoker    Smokeless tobacco: Never Used   Substance Use Topics    Alcohol use: Yes     Comment: rare                                Counseling given: Not Answered      Vital Signs (Current):   Vitals:    04/25/22 0858 04/29/22 0700   BP:  133/84   Pulse:  78   Resp:  16   Temp:  35.9 °C (96.7 °F)   TempSrc:  Temporal   SpO2:  97%   Weight: 162 lb 3.2 oz (73.6 kg) 162 lb (73.5 kg)   Height: 5' 8\" (1.727 m) 5' 8\" (1.727 m)                                              BP Readings from Last 3 Encounters:   04/29/22 133/84   04/07/22 128/80   02/01/22 132/78       NPO Status: Time of last liquid consumption: 2145                        Time of last solid consumption: 2145                        Date of last liquid consumption: 04/28/22                        Date of last solid food consumption: 04/28/22    BMI:   Wt Readings from Last 3 Encounters:   04/29/22 162 lb (73.5 kg)   04/07/22 162 lb 11.2 oz (73.8 kg)   02/01/22 155 lb (70.3 kg)     Body mass index is 24.63 kg/m².     CBC:   Lab Results   Component Value Date    WBC 13.6 04/03/2021    RBC 3.38 04/03/2021    HGB 9.7 04/03/2021    HCT 31.0 04/03/2021    MCV 91.7 04/03/2021    RDW 18.8 04/03/2021     04/03/2021       CMP:   Lab Results   Component Value Date     04/03/2021    K 3.8 04/03/2021    K 3.8 12/30/2020     04/03/2021 CO2 29 04/03/2021    BUN 5 04/03/2021    CREATININE 0.9 04/03/2021    GFRAA >60 04/03/2021    LABGLOM >60 04/03/2021    GLUCOSE 104 04/03/2021    PROT 6.1 04/03/2021    CALCIUM 9.2 04/03/2021    BILITOT 0.3 04/03/2021    ALKPHOS 161 04/03/2021    AST 18 04/03/2021    ALT 18 04/03/2021       POC Tests: No results for input(s): POCGLU, POCNA, POCK, POCCL, POCBUN, POCHEMO, POCHCT in the last 72 hours.     Coags:   Lab Results   Component Value Date    PROTIME 18.9 04/01/2021    INR 1.7 04/01/2021    APTT 39.4 04/01/2021       HCG (If Applicable): No results found for: PREGTESTUR, PREGSERUM, HCG, HCGQUANT     ABGs: No results found for: PHART, PO2ART, SBV1PCZ, CZL8KGN, BEART, K1EIBJUH     Type & Screen (If Applicable):  No results found for: LABABO, LABRH    Drug/Infectious Status (If Applicable):  No results found for: HIV, HEPCAB    COVID-19 Screening (If Applicable):   Lab Results   Component Value Date    COVID19 Not Detected 03/28/2021    COVID19 Not Detected 12/30/2020           Anesthesia Evaluation     Anesthesia Plan        epi ramos, APRN - CRNA   4/29/2022

## 2022-04-29 NOTE — ANESTHESIA PRE PROCEDURE
Department of Anesthesiology  Preprocedure Note       Name:  Ben Garcia   Age:  78 y.o.  :  1955                                          MRN:  [id number]         Date:  2022      Surgeon: Noam Augustine):  Alka Renee MD    Procedure:mediport removal  Medications prior to admission:   Prior to Admission medications    Medication Sig Start Date End Date Taking? Authorizing Provider   metoprolol succinate (TOPROL XL) 50 MG extended release tablet TAKE 1 TABLET BY MOUTH TWICE A DAY 22   Farrukh Escobedo MD   metoprolol succinate (TOPROL XL) 100 MG extended release tablet TAKE 1 TABLET BY MOUTH 2 TIMES DAILY TAKES ALONG WITH A 50MG FOR TOTAL OF 150MG TWICE A DAY 22   Alejandra Aquino DO   potassium chloride (KLOR-CON M) 20 MEQ TBCR extended release tablet TAKE 2 TABLETS BY MOUTH EVERY DAY 22   Historical Provider, MD   folic acid (FOLVITE) 1 MG tablet Take 1 mg by mouth daily    Historical Provider, MD   Multiple Vitamins-Minerals (THERAPEUTIC MULTIVITAMIN-MINERALS) tablet Take 1 tablet by mouth daily    Historical Provider, MD   anastrozole (ARIMIDEX) 1 MG tablet Take 1 mg by mouth daily    Historical Provider, MD   lisinopril (PRINIVIL;ZESTRIL) 20 MG tablet Take 20 mg by mouth 2 times daily    Historical Provider, MD   magnesium oxide (MAG-OX) 400 MG tablet Take one tablet twice daily for (5) days, then one tablet daily for (7) days. 20   Alejandra Aquino DO   apixaban (ELIQUIS) 5 MG TABS tablet Take 5 mg by mouth 2 times daily     Historical Provider, MD   atorvastatin (LIPITOR) 40 MG tablet Take 1 tablet by mouth nightly 18   Sushila Mcknight MD   calcium carbonate (OSCAL) 500 MG TABS tablet Take 1 tablet by mouth 2 times daily 18   Sushila Mcknight MD       Current medications:    No current facility-administered medications for this visit. No current outpatient medications on file.      Facility-Administered Medications Ordered in Other Visits   Medication Dose Route Frequency Provider Last Rate Last Admin    lactated ringers infusion   IntraVENous Continuous Sabiha Beaulieu MD        sodium chloride flush 0.9 % injection 5-40 mL  5-40 mL IntraVENous 2 times per day Sabiha Beaulieu MD        sodium chloride flush 0.9 % injection 5-40 mL  5-40 mL IntraVENous PRN Sabiha Beaulieu MD        sodium chloride flush 0.9 % injection 5-40 mL  5-40 mL IntraVENous 2 times per day Jose Beltran, DO        sodium chloride flush 0.9 % injection 5-40 mL  5-40 mL IntraVENous PRN Jose Beltran, DO        0.9 % sodium chloride infusion   IntraVENous PRN Naa Dagtonya Manuela, DO        fentaNYL (SUBLIMAZE) injection 25 mcg  25 mcg IntraVENous Q5 Min PRN Josetta Dago Erlen, DO        fentaNYL (SUBLIMAZE) injection 50 mcg  50 mcg IntraVENous Q5 Min PRN Josetta Dago Erlen, DO        ondansetron [facility]) injection 4 mg  4 mg IntraVENous Once PRN Jose Beltran, DO           Allergies:     Allergies   Allergen Reactions    Adhesive Tape Rash       Problem List:    Patient Active Problem List   Diagnosis Code    TIA (transient ischemic attack) G45.9    Acute CVA (cerebrovascular accident) ([facility] I63.9    Transient cerebral ischemia G45.9    PAF (paroxysmal atrial fibrillation) ([facility]) I48.0    Breast CA ([facility] C50.919    SARAN (acute kidney injury) ([facility] N17.9    Chemotherapy adverse reaction T45.1X5A    Leukocytosis D72.829    Septic shock with acute organ dysfunction due to Gram positive cocci ([facility]) A41.89, R65.21    COVID-19 U07.1    Atrial fibrillation with RVR ([facility]) I48.91    GI bleed K92.2    History of 2019 novel coronavirus disease (COVID-19) Z86.16    C. difficile diarrhea A04.72    Hypertension I10    Stroke due to embolism of right posterior cerebral artery ([facility]) I61.408       Past Medical History:        Diagnosis Date    Acute CVA (cerebrovascular accident) ([facility] 02/10/2018    SARAN (acute kidney injury) ([facility] 12/03/2020    Atrial fibrillation with RVR ([facility] 12/04/2020    Breast CA ([facility] 12/03/2020    C. difficile diarrhea 01/01/2021    resolved    Cancer ([facility] 11/2020    Breast-left and lymph nodes     Chemotherapy adverse reaction 12/03/2020    GI bleed 12/29/2020    resolved    History of 2019 novel coronavirus disease (COVID-19) 12/03/2000    History of blood transfusion 2020     4 units    Hx of blood clots     Hyperlipidemia     Hypertension     Leukocytosis 12/03/2020    Osteoarthritis     PAF (paroxysmal atrial fibrillation) [facility] 02/26/2018    Septic shock with acute organ dysfunction due to Gram positive cocci ([facility] 12/04/2020    Stroke due to embolism of right posterior cerebral artery ([facility] 02/10/2018    TIA (transient ischemic attack) 02/09/2018    Transient cerebral ischemia        Past Surgical History:        Procedure Laterality Date    CERVIX SURGERY      COLONOSCOPY  2010    Negative findings    SAUL US PERQ DEVICE SOFT TISSUE PLMT  FIRST LESION  09/30/2020    SAUL US PERQ DEVICE SOFT TISSUE PLMT  FIRST LESION 9/30/2020 [facility]    PORT SURGERY N/A 11/13/2020    POWER PORT INSERTION, RIGHT SUBCLAVIAN performed by Sabiha Beaulieu MD at [address] [place] [address] N/A 12/06/2020    PORT REMOVAL performed by Sabiha Beaulieu MD at [address] [place] [address] N/A 1/15/2021    POWER PORT INSERTION performed by Sabiha Beaulieu MD at [facility] ECHOCARDIOGRAM  05/2018    No shunt; no vegetation    UPPER GASTROINTESTINAL ENDOSCOPY N/A 12/31/2020    EGD ESOPHAGOGASTRODUODENOSCOPY ANTRAL BIOPSY performed by Sabiha Beaulieu MD at [address] LEFT  09/30/2020    US BREAST NEEDLE BIOPSY LEFT 9/30/2020 [facility]    WISDOM TOOTH EXTRACTION         Social History:    Social History     Tobacco Use    Smoking status: Never Smoker    Smokeless tobacco: Never Used   Substance Use Topics    Alcohol use: Yes     Comment: rare Counseling given: Not Answered      Vital Signs (Current): There were no vitals filed for this visit. BP Readings from Last 3 Encounters:   04/29/22 133/84   04/07/22 128/80   02/01/22 132/78       NPO Status:  > 8 hours                                                                               BMI:   Wt Readings from Last 3 Encounters:   04/29/22 162 lb (73.5 kg)   04/07/22 162 lb 11.2 oz (73.8 kg)   02/01/22 155 lb (70.3 kg)     There is no height or weight on file to calculate BMI.    CBC:   Lab Results   Component Value Date    WBC 13.6 04/03/2021    RBC 3.38 04/03/2021    HGB 9.7 04/03/2021    HCT 31.0 04/03/2021    MCV 91.7 04/03/2021    RDW 18.8 04/03/2021     04/03/2021       CMP:   Lab Results   Component Value Date     04/03/2021    K 3.8 04/03/2021    K 3.8 12/30/2020     04/03/2021    CO2 29 04/03/2021    BUN 5 04/03/2021    CREATININE 0.9 04/03/2021    GFRAA >60 04/03/2021    LABGLOM >60 04/03/2021    GLUCOSE 104 04/03/2021    PROT 6.1 04/03/2021    CALCIUM 9.2 04/03/2021    BILITOT 0.3 04/03/2021    ALKPHOS 161 04/03/2021    AST 18 04/03/2021    ALT 18 04/03/2021       POC Tests: No results for input(s): POCGLU, POCNA, POCK, POCCL, POCBUN, POCHEMO, POCHCT in the last 72 hours.     Coags:   Lab Results   Component Value Date    PROTIME 18.9 04/01/2021    INR 1.7 04/01/2021    APTT 39.4 04/01/2021       HCG (If Applicable): No results found for: PREGTESTUR, PREGSERUM, HCG, HCGQUANT     ABGs: No results found for: PHART, PO2ART, WUW4MCM, YSH6KOR, BEART, D6TOHLSY     Type & Screen (If Applicable):  No results found for: LABABO, LABRH    Drug/Infectious Status (If Applicable):  No results found for: HIV, HEPCAB    COVID-19 Screening (If Applicable):   Lab Results   Component Value Date    COVID19 Not Detected 03/28/2021    COVID19 Not Detected 12/30/2020         Anesthesia Evaluation  Patient summary reviewed and Nursing notes reviewed no history of anesthetic complications:   Airway: Mallampati: II  TM distance: >3 FB   Neck ROM: full  Mouth opening: > = 3 FB Dental:      Comment: Intact, missing left upper molar. Pulmonary:Negative Pulmonary ROS breath sounds clear to auscultation                             Cardiovascular:    (+) hypertension: moderate, dysrhythmias: atrial fibrillation, hyperlipidemia      ECG reviewed  Rhythm: regular  Rate: normal  Echocardiogram reviewed                  Neuro/Psych:   (+) CVA ((Stroke due to embolism of right posterior cerebral artery )Patient had peripheral vision loss which essentially returned after receiving TPA. 2018 ):, TIA,             GI/Hepatic/Renal:   (+) GERD:,           Endo/Other:    (+) blood dyscrasia: anticoagulation therapy:., malignancy/cancer (Breast CA Lt.). Abdominal:             Vascular: Other Findings:               Anesthesia Plan      MAC     ASA 3       Induction: intravenous. Anesthetic plan and risks discussed with patient. Plan discussed with CRNA. Devan Sung DO   4/29/2022    Pt seen, examined, chart reviewed, plan discussed. Devan Sung DO  4/29/2022    Patient seen and examined, chart reviewed, agree with above findings. Anesthetic plan, risks, benefits, alternatives, and personnel involved discussed with patient. Patient verbalized an understanding and agreed to proceed. NPO status confirmed. Anesthetic plan discussed with care team members and agreed upon.     Devan Sung DO   4/29/2022  8:15 AM

## 2022-04-29 NOTE — INTERVAL H&P NOTE
Update History & Physical    The patient's History and Physical of April 29, 2022 was reviewed with the patient and I examined the patient. There was no change. The surgical site was confirmed by the patient and me. Plan: The risks, benefits, expected outcome, and alternative to the recommended procedure have been discussed with the patient. Patient understands and wants to proceed with the procedure.      Electronically signed by Henri Ribeiro MD on 4/29/2022 at 6:57 AM

## 2022-04-29 NOTE — ANESTHESIA PRE PROCEDURE
Department of Anesthesiology  Preprocedure Note       Name:  Aravind Lyons   Age:  78 y.o.  :  1955                                          MRN:  [id number]         Date:  2022      Surgeon: Roxy Ruffin):  Nora Farfan MD    Procedure: Procedure(s): MEDIPORT REMOVAL    Medications prior to admission:   Prior to Admission medications    Medication Sig Start Date End Date Taking? Authorizing Provider   metoprolol succinate (TOPROL XL) 50 MG extended release tablet TAKE 1 TABLET BY MOUTH TWICE A DAY 22   Eamon Block MD   metoprolol succinate (TOPROL XL) 100 MG extended release tablet TAKE 1 TABLET BY MOUTH 2 TIMES DAILY TAKES ALONG WITH A 50MG FOR TOTAL OF 150MG TWICE A DAY 22   Lian Bloom DO   potassium chloride (KLOR-CON M) 20 MEQ TBCR extended release tablet TAKE 2 TABLETS BY MOUTH EVERY DAY 22   Historical Provider, MD   folic acid (FOLVITE) 1 MG tablet Take 1 mg by mouth daily    Historical Provider, MD   Multiple Vitamins-Minerals (THERAPEUTIC MULTIVITAMIN-MINERALS) tablet Take 1 tablet by mouth daily    Historical Provider, MD   anastrozole (ARIMIDEX) 1 MG tablet Take 1 mg by mouth daily    Historical Provider, MD   lisinopril (PRINIVIL;ZESTRIL) 20 MG tablet Take 20 mg by mouth 2 times daily    Historical Provider, MD   magnesium oxide (MAG-OX) 400 MG tablet Take one tablet twice daily for (5) days, then one tablet daily for (7) days.  20   Lian Bloom DO   apixaban (ELIQUIS) 5 MG TABS tablet Take 5 mg by mouth 2 times daily     Historical Provider, MD   atorvastatin (LIPITOR) 40 MG tablet Take 1 tablet by mouth nightly 18   Jennifer Vickers MD   calcium carbonate (OSCAL) 500 MG TABS tablet Take 1 tablet by mouth 2 times daily 18   Jennifer Vickers MD       Current medications:    Current Facility-Administered Medications   Medication Dose Route Frequency Provider Last Rate Last Admin    lactated ringers infusion   IntraVENous Continuous Piedad Lombard, MD        sodium chloride flush 0.9 % injection 5-40 mL  5-40 mL IntraVENous 2 times per day Piedad Lombard, MD        sodium chloride flush 0.9 % injection 5-40 mL  5-40 mL IntraVENous PRN Piedad Lombard, MD        sodium chloride flush 0.9 % injection 5-40 mL  5-40 mL IntraVENous 2 times per day Glennette Gamma, DO        sodium chloride flush 0.9 % injection 5-40 mL  5-40 mL IntraVENous PRN Glennette Gamma, DO        0.9 % sodium chloride infusion   IntraVENous PRN Roseline Gamma, DO   New Bag at 04/29/22 0730    fentaNYL (SUBLIMAZE) injection 25 mcg  25 mcg IntraVENous Q5 Min PRN Linda Caller Erlen, DO        fentaNYL (SUBLIMAZE) injection 50 mcg  50 mcg IntraVENous Q5 Min PRN Linda Caller Erlen, DO        ondansetron [facility]) injection 4 mg  4 mg IntraVENous Once PRN Roseline Gamma, DO        lidocaine 1 % injection    PRN Piedad Lombard, MD   10 mL at 04/29/22 5460     Facility-Administered Medications Ordered in Other Encounters   Medication Dose Route Frequency Provider Last Rate Last Admin    fentaNYL (SUBLIMAZE) injection   IntraVENous PRN Reba Aminata, APRN - CRNA   50 mcg at 04/29/22 7279    midazolam (VERSED) injection   IntraVENous PRN Reba Gabrielk, APRN - CRNA   2 mg at 04/29/22 0845    propofol injection   IntraVENous PRN Reba Gabrielk, APRN - CRNA   50 mg at 04/29/22 0858    ondansetron (ZOFRAN) injection   IntraVENous PRN Reba Aminata, APRN - CRNA   4 mg at 04/29/22 0845    ceFAZolin (ANCEF) injection   IntraVENous PRN Reba Gabrielk, APRN - CRNA   1,000 mg at 04/29/22 4193    Dexamethasone Sodium Phosphate injection   IntraVENous PRN Reba Gabrielk, APRN - CRNA   10 mg at 04/29/22 0845       Allergies:     Allergies   Allergen Reactions    Adhesive Tape Rash       Problem List:    Patient Active Problem List   Diagnosis Code    TIA (transient ischemic attack) G45.9    Acute CVA (cerebrovascular accident) ([facility] I63.9    Transient cerebral ischemia G45.9    PAF (paroxysmal atrial fibrillation) (HCC) I48.0    Breast CA (HCC) C50.919    SARAN (acute kidney injury) ([address] N17.9    Chemotherapy adverse reaction T45.1X5A    Leukocytosis D72.829    Septic shock with acute organ dysfunction due to Gram positive cocci (HCC) A41.89, R65.21    COVID-19 U07.1    Atrial fibrillation with RVR (HCC) I48.91    GI bleed K92.2    History of 2019 novel coronavirus disease (COVID-19) Z86.16    C. difficile diarrhea A04.72    Hypertension I10    Stroke due to embolism of right posterior cerebral artery (HCC) I63.431       Past Medical History:        Diagnosis Date    Acute CVA (cerebrovascular accident) ([address] 02/10/2018    SARAN (acute kidney injury) ([address] 12/03/2020    Atrial fibrillation with RVR ([address] 12/04/2020    Breast CA ([address] 12/03/2020    C. difficile diarrhea 01/01/2021    resolved    Cancer ([address] 11/2020    Breast-left and lymph nodes     Chemotherapy adverse reaction 12/03/2020    GI bleed 12/29/2020    resolved    History of 2019 novel coronavirus disease (COVID-19) 12/03/2000    History of blood transfusion 2020     4 units    Hx of blood clots     Hyperlipidemia     Hypertension     Leukocytosis 12/03/2020    Osteoarthritis     PAF (paroxysmal atrial fibrillation) ([address] 02/26/2018    Septic shock with acute organ dysfunction due to Gram positive cocci ([address] 12/04/2020    Stroke due to embolism of right posterior cerebral artery ([address] 02/10/2018    TIA (transient ischemic attack) 02/09/2018    Transient cerebral ischemia        Past Surgical History:        Procedure Laterality Date    CERVIX SURGERY      COLONOSCOPY  2010    Negative findings    SAUL US PERQ DEVICE SOFT TISSUE PLMT  FIRST LESION  09/30/2020    SAUL US PERQ DEVICE SOFT TISSUE PLMT  FIRST LESION 9/30/2020 [facility]    PORT SURGERY N/A 11/13/2020    POWER PORT INSERTION, RIGHT SUBCLAVIAN performed by Edilia Cat MD at [facility] SURGERY N/A 12/06/2020    PORT REMOVAL performed by Latisha Osei MD at [address] [place] [address] N/A 1/15/2021    POWER PORT INSERTION performed by Latisha Osei MD at [facility] ECHOCARDIOGRAM  05/2018    No shunt; no vegetation    UPPER GASTROINTESTINAL ENDOSCOPY N/A 12/31/2020    EGD ESOPHAGOGASTRODUODENOSCOPY ANTRAL BIOPSY performed by Latisha Osei MD at [address] LEFT  09/30/2020     BREAST NEEDLE BIOPSY LEFT 9/30/2020 [facility]    WISDOM TOOTH EXTRACTION         Social History:    Social History     Tobacco Use    Smoking status: Never Smoker    Smokeless tobacco: Never Used   Substance Use Topics    Alcohol use: Yes     Comment: rare                                Counseling given: Not Answered      Vital Signs (Current):   Vitals:    04/25/22 0858 04/29/22 0700   BP:  133/84   Pulse:  78   Resp:  16   Temp:  35.9 °C (96.7 °F)   TempSrc:  Temporal   SpO2:  97%   Weight: 162 lb 3.2 oz (73.6 kg) 162 lb (73.5 kg)   Height: 5' 8\" (1.727 m) 5' 8\" (1.727 m)                                              BP Readings from Last 3 Encounters:   04/29/22 133/84   04/07/22 128/80   02/01/22 132/78       NPO Status: Time of last liquid consumption: 2145                        Time of last solid consumption: 2145                        Date of last liquid consumption: 04/28/22                        Date of last solid food consumption: 04/28/22    BMI:   Wt Readings from Last 3 Encounters:   04/29/22 162 lb (73.5 kg)   04/07/22 162 lb 11.2 oz (73.8 kg)   02/01/22 155 lb (70.3 kg)     Body mass index is 24.63 kg/m².     CBC:   Lab Results   Component Value Date    WBC 13.6 04/03/2021    RBC 3.38 04/03/2021    HGB 9.7 04/03/2021    HCT 31.0 04/03/2021    MCV 91.7 04/03/2021    RDW 18.8 04/03/2021     04/03/2021       CMP:   Lab Results   Component Value Date     04/03/2021    K 3.8 04/03/2021    K 3.8 12/30/2020     04/03/2021 CO2 29 04/03/2021    BUN 5 04/03/2021    CREATININE 0.9 04/03/2021    GFRAA >60 04/03/2021    LABGLOM >60 04/03/2021    GLUCOSE 104 04/03/2021    PROT 6.1 04/03/2021    CALCIUM 9.2 04/03/2021    BILITOT 0.3 04/03/2021    ALKPHOS 161 04/03/2021    AST 18 04/03/2021    ALT 18 04/03/2021       POC Tests: No results for input(s): POCGLU, POCNA, POCK, POCCL, POCBUN, POCHEMO, POCHCT in the last 72 hours.     Coags:   Lab Results   Component Value Date    PROTIME 18.9 04/01/2021    INR 1.7 04/01/2021    APTT 39.4 04/01/2021       HCG (If Applicable): No results found for: PREGTESTUR, PREGSERUM, HCG, HCGQUANT     ABGs: No results found for: PHART, PO2ART, IAX2XQM, RIW4EGR, BEART, N1DRCKPH     Type & Screen (If Applicable):  No results found for: LABABO, LABRH    Drug/Infectious Status (If Applicable):  No results found for: HIV, HEPCAB    COVID-19 Screening (If Applicable):   Lab Results   Component Value Date    COVID19 Not Detected 03/28/2021    COVID19 Not Detected 12/30/2020           Anesthesia Evaluation     Anesthesia Plan        epi ramos, APRN - CRNA   4/29/2022

## 2022-04-29 NOTE — OP NOTE
Operative Note      Patient: Omar Parsons  YOB: 1955  MRN: [id number]    Date of Procedure: 4/29/2022    Pre-Op Diagnosis: BREAST CANCER    Post-Op Diagnosis: Same       Procedure(s): MEDIPORT REMOVAL    Surgeon(s):  Flako Carlin MD    Assistant:   Resident: Mayra Moore MD    Anesthesia: Monitor Anesthesia Care    Estimated Blood Loss (mL): Minimal    Complications: None    Specimens:   ID Type Source Tests Collected by Time Destination   A : mediport right chest Hardware Hardware SURGICAL PATHOLOGY Flako Carlin MD 4/29/2022 8232        Implants:  * No implants in log *      Drains: * No LDAs found *    Findings: R subclavian Mediport    Detailed Description of Procedure: Indications:  Omar Parsons is a 78year old female that has completed chemotherapy. Recommendation to remove Mediport. Procedure: The patient was taken to the operating room and placed in the supine position. Sequential compression devices applied and 2 g Ancef given. Anesthesia was administered per anesthesia record. Immediately prior to the procedure a time-out was called. 10 cc Marcaine was injected into the skin. A 15 blade was used to make a 4 cm incision over prior scar. Electrocautery was used to free up the port from the chest wall. The catheter was grasped with hemostats and was cut near the port. The catheter was removed in its entirety. Pressure was held at the vein and there was no bleeding appreciated. The port was grasped with hemostats and freed from the chest wall with electrocautery. The port was removed and the cavity was irrigated with saline. No further bleeding was appreciated. The deep layer was closed with 3 deep dermal 3-0 Vicryl sutures and the skin was closed with 4-0 Vicryl in a running subcuticular fashion. The skin was cleaned with saline and dried. Skin glue was applied. Instrument and material count was correct x 2 at the end of the case.  The patient tolerated the Subjective:       Patient ID: Mirna Cline is a 73 y.o. female.    Chief Complaint: Hypertension and Hyperlipidemia  74 yo female with dementia, HTN, obesity, Non-Hodgkin's lymphoma in remission presents for f/u of her chronic conditions. Pt hospitalized 4/19 through 4/20 for a COVID pneumonia. Pt discharged on oxygen via nasal cannula. Pt has discontinued continuous usage of oxygen.. Pt notes intermittent dyspnea.  Pt requests a refill on Flonase for chronic rhinitis. She also requests a refill on her Epi Pen.  Notes intermittent hiccups.   Hypertension   This is a chronic problem. The current episode started more than 1 year ago. The problem is unchanged. The problem is controlled. Associated symptoms include palpitations and shortness of breath. Pertinent negatives include no blurred vision, chest pain, headaches, orthopnea, peripheral edema or PND. Past treatments include calcium channel blockers, beta blockers, angiotensin blockers and diuretics. The current treatment provides significant improvement. There are no compliance problems.  There is no history of chronic renal disease.   Hyperlipidemia   This is a chronic problem. The current episode started more than 1 year ago. The problem is controlled. Recent lipid tests were reviewed and are normal. Exacerbating diseases include obesity. She has no history of chronic renal disease, diabetes, hypothyroidism, liver disease or nephrotic syndrome. Associated symptoms include shortness of breath. Pertinent negatives include no chest pain or myalgias.   O2 saturation 97% after walking down the [place] and back.  Review of Systems   Constitutional: Negative for chills and fever.   Eyes: Negative for blurred vision.   Respiratory: Positive for shortness of breath.    Cardiovascular: Positive for palpitations. Negative for chest pain, orthopnea, leg swelling and PND.   Gastrointestinal: Negative for abdominal pain, anal bleeding, blood in stool, nausea and vomiting.    Genitourinary: Negative for hematuria.   Musculoskeletal: Negative for myalgias.   Neurological: Negative for headaches.       Objective:      Physical Exam   Constitutional: She is oriented to person, place, and time. She appears well-developed and well-nourished. No distress.   HENT:   Head: Normocephalic and atraumatic.   Neck: Normal range of motion. Neck supple. No JVD present. No thyromegaly present.   Cardiovascular: Normal rate, regular rhythm and normal heart sounds.   Pulmonary/Chest: Effort normal and breath sounds normal. She has no wheezes. She has no rales.   Abdominal: Soft. Bowel sounds are normal. She exhibits no mass. There is no tenderness.   Lymphadenopathy:     She has no cervical adenopathy.   Neurological: She is alert and oriented to person, place, and time.   Skin: Skin is warm and dry. She is not diaphoretic.   Vitals reviewed.      Assessment:         See plan  Plan:       Essential hypertension: Stable    Mixed hyperlipidemia: Stable  -     Comprehensive metabolic panel; Future; Expected date: 05/26/2020  -     Lipid Panel; Future; Expected date: 05/26/2020    Requires supplemental oxygen  - Resolved.    BMI 33.0-33.9,adult  The patient's BMI has been recorded in the chart. The patient has been provided educational materials regarding the benefits of attaining and maintaining a normal weight. We will continue to address and follow this issue during follow up visits.    Rhinitis, unspecified type: Stable  -     fluticasone propionate (FLONASE) 50 mcg/actuation nasal spray; SHAKE LIQUID AND USE 2 SPRAYS(100 MCG) IN EACH NOSTRIL EVERY DAY  Dispense: 48 mL; Refill: 1    Allergic to insect bites and stings  -     EPINEPHrine (EPIPEN) 0.3 mg/0.3 mL AtIn; INJECT 0.3 ML IN THE MUSCLE ONCE as needed  Dispense: 2 each; Refill: 0    Encounter for screening mammogram for breast cancer  -     Mammo Digital Screening Bilat; Future; Expected date: 05/26/2020    Postmenopausal  -     DXA Bone Density  procedure well and was brought to PACU in stable condition. Dr. Candace Raphael was present for the case.     Electronically signed by Abundio Vaz MD on 4/29/2022 at 9:16 AM Spine And Hip; Future; Expected date: 05/26/2020    F/U in 4 months.

## 2022-04-29 NOTE — ANESTHESIA POSTPROCEDURE EVALUATION
Department of Anesthesiology  Postprocedure Note    Patient: Everardo Fry  MRN: [id number]  YOB: 1955  Date of evaluation: 4/29/2022  Time:  9:21 AM     Procedure Summary     Date: 04/29/22 Room / Location: [facility] 09 / [facility]    Anesthesia Start: 8481 Anesthesia Stop: 3725    Procedure: MEDIPORT REMOVAL (N/A Chest) Diagnosis: (BREAST CANCER)    Surgeons: Henri Ribeiro MD Responsible Provider: Kamran Parikh DO    Anesthesia Type: Not recorded ASA Status: 3          Anesthesia Type: No value filed. Esteban Phase I: Esteban Score: 10    Esteban Phase II:      Last vitals: Reviewed and per EMR flowsheets.        Anesthesia Post Evaluation    Patient location during evaluation: bedside  Patient participation: complete - patient participated  Level of consciousness: awake and awake and alert  Pain score: 0  Airway patency: patent  Nausea & Vomiting: no nausea and no vomiting  Complications: no  Cardiovascular status: blood pressure returned to baseline  Respiratory status: acceptable  Hydration status: euvolemic    epi ramos, LALA - CRNA

## 2022-12-01 ENCOUNTER — HOSPITAL ENCOUNTER (OUTPATIENT)
Age: 67
Discharge: HOME | End: 2022-12-03
Payer: MEDICARE

## 2022-12-01 ENCOUNTER — HOSPITAL ENCOUNTER (OUTPATIENT)
Dept: ULTRASOUND IMAGING | Age: 67
Discharge: HOME | End: 2022-12-03
Payer: MEDICARE

## 2022-12-01 DIAGNOSIS — M79.602 LEFT ARM PAIN: ICD-10-CM

## 2022-12-01 DIAGNOSIS — R60.9 EDEMA, UNSPECIFIED TYPE: ICD-10-CM

## 2022-12-01 PROCEDURE — 93971 EXTREMITY STUDY: CPT

## 2022-12-02 ENCOUNTER — APPOINTMENT (OUTPATIENT)
Dept: CT IMAGING | Age: 67
DRG: 816 | End: 2022-12-02
Payer: MEDICARE

## 2022-12-02 ENCOUNTER — HOSPITAL ENCOUNTER (INPATIENT)
Age: 67
LOS: 3 days | Discharge: HOME | DRG: 816 | End: 2022-12-05
Attending: EMERGENCY MEDICINE | Admitting: INTERNAL MEDICINE
Payer: MEDICARE

## 2022-12-02 DIAGNOSIS — R91.8 LUNG NODULES: ICD-10-CM

## 2022-12-02 DIAGNOSIS — N63.20 MASS OF LEFT BREAST, UNSPECIFIED QUADRANT: ICD-10-CM

## 2022-12-02 DIAGNOSIS — L03.90 CELLULITIS OF SKIN: ICD-10-CM

## 2022-12-02 DIAGNOSIS — R59.9 SWOLLEN LYMPH NODES: Primary | ICD-10-CM

## 2022-12-02 LAB
ALBUMIN SERPL-MCNC: 3.7 G/DL (ref 3.5–5.2)
ALP BLD-CCNC: 233 U/L (ref 35–104)
ALT SERPL-CCNC: 33 U/L (ref 0–32)
ANION GAP SERPL CALCULATED.3IONS-SCNC: 10 MMOL/L (ref 7–16)
AST SERPL-CCNC: 26 U/L (ref 0–31)
BASOPHILS ABSOLUTE: 0.02 E9/L (ref 0–0.2)
BASOPHILS RELATIVE PERCENT: 0.3 % (ref 0–2)
BILIRUB SERPL-MCNC: 0.5 MG/DL (ref 0–1.2)
BUN BLDV-MCNC: 14 MG/DL (ref 6–23)
C-REACTIVE PROTEIN: 0.7 MG/DL (ref 0–0.4)
CALCIUM SERPL-MCNC: 9.4 MG/DL (ref 8.6–10.2)
CHLORIDE BLD-SCNC: 107 MMOL/L (ref 98–107)
CO2: 26 MMOL/L (ref 22–29)
CREAT SERPL-MCNC: 0.9 MG/DL (ref 0.5–1)
EOSINOPHILS ABSOLUTE: 0.06 E9/L (ref 0.05–0.5)
EOSINOPHILS RELATIVE PERCENT: 1 % (ref 0–6)
GFR SERPL CREATININE-BSD FRML MDRD: >60 ML/MIN/1.73
GLUCOSE BLD-MCNC: 109 MG/DL (ref 74–99)
HCT VFR BLD CALC: 42.4 % (ref 34–48)
HEMOGLOBIN: 13.6 G/DL (ref 11.5–15.5)
IMMATURE GRANULOCYTES #: 0.02 E9/L
IMMATURE GRANULOCYTES %: 0.3 % (ref 0–5)
LYMPHOCYTES ABSOLUTE: 1.04 E9/L (ref 1.5–4)
LYMPHOCYTES RELATIVE PERCENT: 16.8 % (ref 20–42)
MCH RBC QN AUTO: 29.1 PG (ref 26–35)
MCHC RBC AUTO-ENTMCNC: 32.1 % (ref 32–34.5)
MCV RBC AUTO: 90.6 FL (ref 80–99.9)
MONOCYTES ABSOLUTE: 0.61 E9/L (ref 0.1–0.95)
MONOCYTES RELATIVE PERCENT: 9.8 % (ref 2–12)
NEUTROPHILS ABSOLUTE: 4.45 E9/L (ref 1.8–7.3)
NEUTROPHILS RELATIVE PERCENT: 71.8 % (ref 43–80)
PDW BLD-RTO: 13.5 FL (ref 11.5–15)
PLATELET # BLD: 180 E9/L (ref 130–450)
PMV BLD AUTO: 10.8 FL (ref 7–12)
POTASSIUM SERPL-SCNC: 4.5 MMOL/L (ref 3.5–5)
RBC # BLD: 4.68 E12/L (ref 3.5–5.5)
SEDIMENTATION RATE, ERYTHROCYTE: 19 MM/HR (ref 0–20)
SODIUM BLD-SCNC: 143 MMOL/L (ref 132–146)
TOTAL PROTEIN: 6.6 G/DL (ref 6.4–8.3)
WBC # BLD: 6.2 E9/L (ref 4.5–11.5)

## 2022-12-02 PROCEDURE — 71260 CT THORAX DX C+: CPT

## 2022-12-02 PROCEDURE — 2580000003 HC RX 258: Performed by: NURSE PRACTITIONER

## 2022-12-02 PROCEDURE — 85651 RBC SED RATE NONAUTOMATED: CPT

## 2022-12-02 PROCEDURE — 86140 C-REACTIVE PROTEIN: CPT

## 2022-12-02 PROCEDURE — 36415 COLL VENOUS BLD VENIPUNCTURE: CPT

## 2022-12-02 PROCEDURE — 80053 COMPREHEN METABOLIC PANEL: CPT

## 2022-12-02 PROCEDURE — 85025 COMPLETE CBC W/AUTO DIFF WBC: CPT

## 2022-12-02 PROCEDURE — 6370000000 HC RX 637 (ALT 250 FOR IP): Performed by: NURSE PRACTITIONER

## 2022-12-02 PROCEDURE — 6360000002 HC RX W HCPCS: Performed by: NURSE PRACTITIONER

## 2022-12-02 PROCEDURE — 2060000000 HC ICU INTERMEDIATE R&B

## 2022-12-02 PROCEDURE — 87040 BLOOD CULTURE FOR BACTERIA: CPT

## 2022-12-02 PROCEDURE — 96374 THER/PROPH/DIAG INJ IV PUSH: CPT

## 2022-12-02 PROCEDURE — 99285 EMERGENCY DEPT VISIT HI MDM: CPT

## 2022-12-02 PROCEDURE — 2580000003 HC RX 258

## 2022-12-02 PROCEDURE — 6360000004 HC RX CONTRAST MEDICATION: Performed by: RADIOLOGY

## 2022-12-02 RX ORDER — SODIUM CHLORIDE 9 MG/ML
INJECTION, SOLUTION INTRAVENOUS PRN
Status: DISCONTINUED | OUTPATIENT
Start: 2022-12-02 | End: 2022-12-05 | Stop reason: HOSPADM

## 2022-12-02 RX ORDER — SODIUM CHLORIDE 0.9 % (FLUSH) 0.9 %
SYRINGE (ML) INJECTION
Status: COMPLETED
Start: 2022-12-02 | End: 2022-12-02

## 2022-12-02 RX ORDER — ACETAMINOPHEN 325 MG/1
650 TABLET ORAL EVERY 6 HOURS PRN
Status: DISCONTINUED | OUTPATIENT
Start: 2022-12-02 | End: 2022-12-05 | Stop reason: HOSPADM

## 2022-12-02 RX ORDER — POLYETHYLENE GLYCOL 3350 17 G/17G
17 POWDER, FOR SOLUTION ORAL DAILY PRN
Status: DISCONTINUED | OUTPATIENT
Start: 2022-12-02 | End: 2022-12-05 | Stop reason: HOSPADM

## 2022-12-02 RX ORDER — FOLIC ACID 1 MG/1
1 TABLET ORAL DAILY
Status: DISCONTINUED | OUTPATIENT
Start: 2022-12-03 | End: 2022-12-05 | Stop reason: HOSPADM

## 2022-12-02 RX ORDER — CALCIUM CARBONATE 500(1250)
500 TABLET ORAL 2 TIMES DAILY
Status: DISCONTINUED | OUTPATIENT
Start: 2022-12-02 | End: 2022-12-05 | Stop reason: HOSPADM

## 2022-12-02 RX ORDER — ANASTROZOLE 1 MG/1
1 TABLET ORAL DAILY
Status: DISCONTINUED | OUTPATIENT
Start: 2022-12-03 | End: 2022-12-05 | Stop reason: HOSPADM

## 2022-12-02 RX ORDER — SODIUM CHLORIDE 0.9 % (FLUSH) 0.9 %
10 SYRINGE (ML) INJECTION PRN
Status: DISCONTINUED | OUTPATIENT
Start: 2022-12-02 | End: 2022-12-05 | Stop reason: HOSPADM

## 2022-12-02 RX ORDER — METOPROLOL SUCCINATE 50 MG/1
1 TABLET, EXTENDED RELEASE ORAL 2 TIMES DAILY
Status: DISCONTINUED | OUTPATIENT
Start: 2022-12-02 | End: 2022-12-02 | Stop reason: SDUPTHER

## 2022-12-02 RX ORDER — LISINOPRIL 20 MG/1
20 TABLET ORAL 2 TIMES DAILY
Status: DISCONTINUED | OUTPATIENT
Start: 2022-12-02 | End: 2022-12-05 | Stop reason: HOSPADM

## 2022-12-02 RX ORDER — METOPROLOL SUCCINATE 100 MG/1
100 TABLET, EXTENDED RELEASE ORAL 2 TIMES DAILY
Status: DISCONTINUED | OUTPATIENT
Start: 2022-12-02 | End: 2022-12-02 | Stop reason: SDUPTHER

## 2022-12-02 RX ORDER — ATORVASTATIN CALCIUM 40 MG/1
40 TABLET, FILM COATED ORAL NIGHTLY
Status: DISCONTINUED | OUTPATIENT
Start: 2022-12-03 | End: 2022-12-05 | Stop reason: HOSPADM

## 2022-12-02 RX ORDER — SODIUM CHLORIDE 0.9 % (FLUSH) 0.9 %
5-40 SYRINGE (ML) INJECTION EVERY 12 HOURS SCHEDULED
Status: DISCONTINUED | OUTPATIENT
Start: 2022-12-02 | End: 2022-12-05 | Stop reason: HOSPADM

## 2022-12-02 RX ORDER — ONDANSETRON 4 MG/1
4 TABLET, ORALLY DISINTEGRATING ORAL EVERY 8 HOURS PRN
Status: DISCONTINUED | OUTPATIENT
Start: 2022-12-02 | End: 2022-12-05 | Stop reason: HOSPADM

## 2022-12-02 RX ORDER — ACETAMINOPHEN 650 MG/1
650 SUPPOSITORY RECTAL EVERY 6 HOURS PRN
Status: DISCONTINUED | OUTPATIENT
Start: 2022-12-02 | End: 2022-12-05 | Stop reason: HOSPADM

## 2022-12-02 RX ORDER — ONDANSETRON 2 MG/ML
4 INJECTION INTRAMUSCULAR; INTRAVENOUS EVERY 6 HOURS PRN
Status: DISCONTINUED | OUTPATIENT
Start: 2022-12-02 | End: 2022-12-05 | Stop reason: HOSPADM

## 2022-12-02 RX ADMIN — SODIUM CHLORIDE, PRESERVATIVE FREE 10 ML: 5 INJECTION INTRAVENOUS at 21:24

## 2022-12-02 RX ADMIN — IOPAMIDOL 75 ML: 755 INJECTION, SOLUTION INTRAVENOUS at 15:32

## 2022-12-02 RX ADMIN — LISINOPRIL 20 MG: 20 TABLET ORAL at 19:12

## 2022-12-02 RX ADMIN — WATER 2000 MG: 1 INJECTION INTRAMUSCULAR; INTRAVENOUS; SUBCUTANEOUS at 16:56

## 2022-12-02 RX ADMIN — METOPROLOL SUCCINATE 150 MG: 100 TABLET, EXTENDED RELEASE ORAL at 19:12

## 2022-12-02 ASSESSMENT — PAIN - FUNCTIONAL ASSESSMENT: PAIN_FUNCTIONAL_ASSESSMENT: NONE - DENIES PAIN

## 2022-12-02 ASSESSMENT — LIFESTYLE VARIABLES
HOW MANY STANDARD DRINKS CONTAINING ALCOHOL DO YOU HAVE ON A TYPICAL DAY: PATIENT DOES NOT DRINK
HOW OFTEN DO YOU HAVE A DRINK CONTAINING ALCOHOL: NEVER

## 2022-12-02 NOTE — ED NOTES
FIRST PROVIDER CONTACT ASSESSMENT NOTE      Department of Emergency Medicine   Admit Date: No admission date for patient encounter. Chief Complaint: Other (X 2 days / Sent in by pcp for thrombophlebitis on left arm and left abd)      History of Present Illness:    Jes Turner is a 78 y.o. female who presents to the ED for possible cellulitis/infection. For couple days patient had swelling and redness to her left upper arm. Outpatient ultrasound diagnosed thrombophlebitis. PCP recommended NSAID. She now presents with erythema and warmth to her abdomen.   PCP sent her in for evaluation and blood work.        -----------------[address] [place] [address]--------------  Electronically signed by MARGRET Nova   DD: 12/2/22             MARGRET Nova  12/02/22 1300

## 2022-12-02 NOTE — ED PROVIDER NOTES
ED Attending shared visit  CC: Nilsa       [facility]  Department of Emergency Medicine   ED  Encounter Note  Admit Date/RoomTime: 2022  1:55 PM  ED Room: 0729/0729-A    NAME: Pari Angeles  : 1955  MRN: [id number]     Chief Complaint:  Other (X 2 days / Oscar Echevarria in by pcp for thrombophlebitis on left arm and left abd)    HISTORY OF PRESENT ILLNESS        Pari Angeles is a 78 y.o. female who presents to the ED by private vehicle for swollen lumps on her left upper arm/underarm and below her left breast, beginning 2 days ago. The complaint has been recurrent and worsening and are moderate in severity. She had an ultrasound done of the left upper extremity yesterday which revealed no evidence of DVT but did show superficial thrombophlebitis involving the ballistic vein. She is currently on Eliquis and has not missed any dosing. Her PCP told her to take ibuprofen. She states today she noticed swelling underneath her left breast has a history of breast cancer last mammogram was in 2021. Denies any chest pain, short of breath, abdominal pain, lightness of dizziness. Denies any recent blood drawls, IVs or PICC line in that arm. Denies any fevers or chills. ROS   Pertinent positives and negatives are stated within HPI, all other systems reviewed and are negative.     Past Medical History:  has a past medical history of Acute CVA (cerebrovascular accident) ([address] SARAN (acute kidney injury) ([address] Atrial fibrillation with RVR ([address] Breast CA ([address] C. difficile diarrhea, Cancer ([address] Chemotherapy adverse reaction, GI bleed, History of 2019 novel coronavirus disease (COVID-19), History of blood transfusion, Hx of blood clots, Hyperlipidemia, Hypertension, Leukocytosis, Osteoarthritis, PAF (paroxysmal atrial fibrillation) ([address] Septic shock with acute organ dysfunction due to Gram positive cocci ([address] Stroke due to embolism of right posterior cerebral artery ([address] TIA (transient ischemic attack), and Transient cerebral ischemia. Surgical History:  has a past surgical history that includes [place] tooth extraction; Tonsillectomy; Cervix surgery; US BREAST BIOPSY W LOC DEVICE 1ST LESION LEFT (09/30/2020); SAUL US GUIDED PLACE LOC DEVICE PERC 1ST LESION (09/30/2020); [address] Surgery (N/A, 11/13/2020); Port Surgery (N/A, 12/06/2020); transthoracic echocardiogram (05/2018); Colonoscopy (2010); Upper gastrointestinal endoscopy (N/A, 12/31/2020); [address] Surgery (N/A, 1/15/2021); and Port Surgery (N/A, 4/29/2022). Social History:  reports that she has never smoked. She has never used smokeless tobacco. She reports current alcohol use. She reports that she does not use drugs. Family History: family history includes Heart Disease in her mother. Allergies: Adhesive tape    PHYSICAL EXAM   Oxygen Saturation Interpretation: Normal on room air analysis. ED Triage Vitals [12/02/22 1257]   BP Temp Temp Source Heart Rate Resp SpO2 Height Weight   (!) 144/84 97 °F (36.1 °C) Temporal 96 14 96 % 5' 8\" (1.727 m) 169 lb (76.7 kg)         Physical Exam  Constitutional/General: Alert and oriented x3, well appearing, non toxic. HEENT:  NC/NT. PERRLA,  Airway patent. Neck: Supple, full ROM, non tender to palpation in the midline, no stridor, no crepitus, no meningeal signs  Respiratory: Lungs clear to auscultation bilaterally, no wheezes, rales, or rhonchi. Not in respiratory distress  CV:  Regular rate. Regular rhythm. No murmurs, gallops, or rubs. 2+ distal pulses  Chest: No chest wall tenderness. Below the left breast swollen lumps. GI:  Abdomen Soft, Non tender, Non distended. +BS. No rebound, guarding, or rigidity. No pulsatile masses. Musculoskeletal: Moves all extremities x 4. Warm and well perfused, no clubbing, cyanosis, or edema. Capillary refill <3 seconds  Integument: skin warm and dry. No rashes. Lymphatic: Swollen lymph nodes on the left underarm.   Neurologic: GCS 15, no focal deficits, symmetric strength 5/5 in the upper and lower extremities bilaterally  Psychiatric: Normal Affect      **Informed Consent**    The patient has given verbal consent to have photos taken of left side and electronically inserted into their ED Provider Note as part of their permanent medical record for purposes of illustration, documentation, treatment management and/or medical review. All Images taken on 12/2/22 of patient name: Mireya Calix were taken by a [facility] [place] approved registered mobile device via [employer] mobile application and transmitted then stored on a secured [employer] located within [facility].        Lab / Imaging Results   (All laboratory and radiology results have been personally reviewed by myself)  Labs:  Results for orders placed or performed during the hospital encounter of 12/02/22   CBC with Auto Differential   Result Value Ref Range    WBC 6.2 4.5 - 11.5 E9/L    RBC 4.68 3.50 - 5.50 E12/L    Hemoglobin 13.6 11.5 - 15.5 g/dL    Hematocrit 42.4 34.0 - 48.0 %    MCV 90.6 80.0 - 99.9 fL    MCH 29.1 26.0 - 35.0 pg    MCHC 32.1 32.0 - 34.5 %    RDW 13.5 11.5 - 15.0 fL    Platelets 267 306 - 041 E9/L    MPV 10.8 7.0 - 12.0 fL    Neutrophils % 71.8 43.0 - 80.0 %    Immature Granulocytes % 0.3 0.0 - 5.0 %    Lymphocytes % 16.8 (L) 20.0 - 42.0 %    Monocytes % 9.8 2.0 - 12.0 %    Eosinophils % 1.0 0.0 - 6.0 %    Basophils % 0.3 0.0 - 2.0 %    Neutrophils Absolute 4.45 1.80 - 7.30 E9/L    Immature Granulocytes # 0.02 E9/L    Lymphocytes Absolute 1.04 (L) 1.50 - 4.00 E9/L    Monocytes Absolute 0.61 0.10 - 0.95 E9/L    Eosinophils Absolute 0.06 0.05 - 0.50 E9/L    Basophils Absolute 0.02 0.00 - 0.20 E9/L   CMP   Result Value Ref Range    Sodium 143 132 - 146 mmol/L    Potassium 4.5 3.5 - 5.0 mmol/L    Chloride 107 98 - 107 mmol/L    CO2 26 22 - 29 mmol/L    Anion Gap 10 7 - 16 mmol/L    Glucose 109 (H) 74 - 99 mg/dL    BUN 14 6 - 23 mg/dL    Creatinine 0.9 0.5 - 1.0 mg/dL    Est, Glom Filt Rate >60 >=60 mL/min/1.73    Calcium 9.4 8.6 - 10.2 mg/dL    Total Protein 6.6 6.4 - 8.3 g/dL    Albumin 3.7 3.5 - 5.2 g/dL    Total Bilirubin 0.5 0.0 - 1.2 mg/dL    Alkaline Phosphatase 233 (H) 35 - 104 U/L    ALT 33 (H) 0 - 32 U/L    AST 26 0 - 31 U/L   Sedimentation Rate   Result Value Ref Range    Sed Rate 19 0 - 20 mm/Hr     Imaging: All Radiology results interpreted by Radiologist unless otherwise noted. CT CHEST W CONTRAST   Final Result   1. Inflammatory changes are associated with subcutaneous fat of lateral left   chest wall extending to left axilla and visualized proximal left arm. Findings could suggest cellulitis or recent blunt trauma. Clinical   correlation recommended. 2. No evidence of soft tissue abscess. 3. Masslike area of attenuation associated with left breast at the 12 o'clock   position measures approximately 2.2 x 1.9 cm seen on axial image number 54,   series: 2. Mammogram with ultrasound recommended for further evaluation. 4. Numerous nodules are present in both lungs measuring up to 5 mm in left   lower lobe and 3 mm in upper lobes and right lower lobe. 5. Subtle ground-glass and irregular interstitial opacities are present in   left lower lobe which could indicate scarring versus developing viral   pneumonia. RECOMMENDATIONS:   Fleischner Society guidelines for follow-up and management of incidentally   detected pulmonary nodules:      Single Solid Nodule:      Nodule size less than 6 mm   In a low-risk patient, no routine follow-up. In a high-risk patient, optional CT at 12 months. Nodule size equals 6-8 mm   In a low-risk patient, CT at 6-12 months, then consider CT at 18-24 months. In a high-risk patient, CT at 6-12 months, then CT at 18-24 months. Nodule size greater than 8 mm         In a low-risk patient, consider CT at 3 months, PET/CT, or tissue sampling.       In a high-risk patient, consider CT at 3 months, PET/CT, or tissue sampling. Multiple Solid Nodules:      Nodule size less than 6 mm   In a low-risk patient, no routine follow-up. In a high-risk patient, optional CT at 12 months. Nodule size equals 6-8 mm   In a low-risk patient, CT at 3-6 months, then consider CT at 18-24 months. In a high-risk patient, CT at 3-6 months, then CT at 18-24 months. Nodule size greater than 8 mm   In a low-risk patient, CT at 3-6 months, then consider CT at 18-24 months. In a high-risk patient, CT at 3-6 months, then CT at 18-24 months. - Low risk patients include individuals with minimal or absent history of   smoking and other known risk factors. - High risk patients include individuals with a history or smoking or known   risk factors. Radiology 2017 http://pubs. rsna.org/doi/full/10.1148/radiol. 7935782909               ED Course / Medical Decision Making     Medications   sodium chloride flush 0.9 % injection (has no administration in time range)   anastrozole (ARIMIDEX) tablet 1 mg (has no administration in time range)   apixaban (ELIQUIS) tablet 5 mg (0 mg Oral Held 12/2/22 2100)   atorvastatin (LIPITOR) tablet 40 mg (has no administration in time range)   calcium elemental (OSCAL) tablet 500 mg (has no administration in time range)   folic acid (FOLVITE) tablet 1 mg (has no administration in time range)   lisinopril (PRINIVIL;ZESTRIL) tablet 20 mg (20 mg Oral Given 12/2/22 1912)   sodium chloride flush 0.9 % injection 5-40 mL (has no administration in time range)   sodium chloride flush 0.9 % injection 10 mL (has no administration in time range)   0.9 % sodium chloride infusion (has no administration in time range)   ondansetron (ZOFRAN-ODT) disintegrating tablet 4 mg (has no administration in time range)     Or   ondansetron (ZOFRAN) injection 4 mg (has no administration in time range)   polyethylene glycol (GLYCOLAX) packet 17 g (has no administration in time range) acetaminophen (TYLENOL) tablet 650 mg (has no administration in time range)     Or   acetaminophen (TYLENOL) suppository 650 mg (has no administration in time range)   metoprolol succinate (TOPROL XL) extended release tablet 150 mg (150 mg Oral Given 12/2/22 1912)   iopamidol (ISOVUE-370) 76 % injection 75 mL (75 mLs IntraVENous Given 12/2/22 1532)   ceFAZolin (ANCEF) 2,000 mg in sterile water 20 mL IV syringe (2,000 mg IntraVENous Given 12/2/22 1656)        Re-Evaluations:  12/2/22      Time: 1725-reevaluated patient patient agrees and accepts admission to the hospital for further evaluation. Patients condition remains stable. Consultations:             IP CONSULT TO ONCOLOGY  IP CONSULT TO GENERAL SURGERY  IP CONSULT TO INTERNAL MEDICINE  [address]- Spoke with Dr. Amber Covarrubias, neurologist who is covering patient's oncologist he agrees with plan of being admitted for biopsy to rule out lymphoma. 1656-spoke with Dr. Beronica Lakhani, agrees to see patient in the hospital for biopsy of lymph nodes. 1- Dr. Alex Garcia spoke with internal medicine nurse practitioner agrees and accepts admission the hospital for further evaluation and treatment. Procedures:   none    MDM:  Patient presents to the ED for swollen lumps on her underarm going into her arms and under her left breast. Differential diagnoses included but not limited to lymphoma versus cellulitis. Workup in the ED revealed blood cultures are pending at the time. CBC was unremarkable. CMP was unremarkable. Upon examination patient had several palpable swollen lymph nodes in her left underarm with a history of breast cancer with radiation chemotherapy in the year 2020. Sed rate was normal at 19. CT of the chest with contrast reveals inflammatory changes at the lateral left chest wall extending to the left axillary could be cellulitis versus recent blunt trauma which patient denies any trauma. No evidence of soft tissue abscess.   There is a masslike area in the left breast at 12 o'clock position there was also numerous nodules in the bilateral lungs. Spoke with her oncologist, general surgery will be admitted for further evaluation and biopsy. Patient requires continued workup and management of their symptoms and will be admitted to the hospital for further evaluation and treatment. Plan of Care/Counseling:  LALA Reyes CNP and EM Attending Physician reviewed today's visit with the patient in addition to providing specific details for the plan of care and counseling regarding the diagnosis and prognosis. Questions are answered at this time and are agreeable with the plan. ASSESSMENT     1. Swollen lymph nodes    2. Lung nodules    3. Mass of left breast, unspecified quadrant    4. Cellulitis of skin      This patient's ED course included: a personal history and physicial examination, re-evaluation prior to disposition, multiple bedside re-evaluations, and IV medications  This patient has remained hemodynamically stable during their ED course. PLAN   Discharged home. Patient condition is stable. New Medications     Current Discharge Medication List        Electronically signed by LALA Reyes CNP   DD: 12/2/22  **This report was transcribed using voice recognition software. Every effort was made to ensure accuracy; however, inadvertent computerized transcription errors may be present.   END OF PROVIDER NOTE       LALA Reyes CNP  12/02/22 1941

## 2022-12-03 ENCOUNTER — APPOINTMENT (OUTPATIENT)
Dept: ULTRASOUND IMAGING | Age: 67
DRG: 816 | End: 2022-12-03
Payer: MEDICARE

## 2022-12-03 LAB
ANION GAP SERPL CALCULATED.3IONS-SCNC: 12 MMOL/L (ref 7–16)
BUN BLDV-MCNC: 16 MG/DL (ref 6–23)
C-REACTIVE PROTEIN: 0.5 MG/DL (ref 0–0.4)
CALCIUM SERPL-MCNC: 9.7 MG/DL (ref 8.6–10.2)
CHLORIDE BLD-SCNC: 104 MMOL/L (ref 98–107)
CO2: 24 MMOL/L (ref 22–29)
CREAT SERPL-MCNC: 0.9 MG/DL (ref 0.5–1)
GFR SERPL CREATININE-BSD FRML MDRD: >60 ML/MIN/1.73
GLUCOSE BLD-MCNC: 91 MG/DL (ref 74–99)
POTASSIUM REFLEX MAGNESIUM: 4.1 MMOL/L (ref 3.5–5)
PROCALCITONIN: 0.03 NG/ML (ref 0–0.08)
SEDIMENTATION RATE, ERYTHROCYTE: 13 MM/HR (ref 0–20)
SODIUM BLD-SCNC: 140 MMOL/L (ref 132–146)

## 2022-12-03 PROCEDURE — 2060000000 HC ICU INTERMEDIATE R&B

## 2022-12-03 PROCEDURE — 86140 C-REACTIVE PROTEIN: CPT

## 2022-12-03 PROCEDURE — 6370000000 HC RX 637 (ALT 250 FOR IP): Performed by: NURSE PRACTITIONER

## 2022-12-03 PROCEDURE — 97161 PT EVAL LOW COMPLEX 20 MIN: CPT

## 2022-12-03 PROCEDURE — 76641 ULTRASOUND BREAST COMPLETE: CPT

## 2022-12-03 PROCEDURE — 80048 BASIC METABOLIC PNL TOTAL CA: CPT

## 2022-12-03 PROCEDURE — 2580000003 HC RX 258: Performed by: NURSE PRACTITIONER

## 2022-12-03 PROCEDURE — 36415 COLL VENOUS BLD VENIPUNCTURE: CPT

## 2022-12-03 PROCEDURE — 84145 PROCALCITONIN (PCT): CPT

## 2022-12-03 PROCEDURE — 85651 RBC SED RATE NONAUTOMATED: CPT

## 2022-12-03 RX ADMIN — METOPROLOL SUCCINATE 150 MG: 100 TABLET, EXTENDED RELEASE ORAL at 08:16

## 2022-12-03 RX ADMIN — LISINOPRIL 20 MG: 20 TABLET ORAL at 08:16

## 2022-12-03 RX ADMIN — LISINOPRIL 20 MG: 20 TABLET ORAL at 22:10

## 2022-12-03 RX ADMIN — SODIUM CHLORIDE, PRESERVATIVE FREE 10 ML: 5 INJECTION INTRAVENOUS at 08:17

## 2022-12-03 RX ADMIN — ATORVASTATIN CALCIUM 40 MG: 40 TABLET, FILM COATED ORAL at 22:10

## 2022-12-03 RX ADMIN — FOLIC ACID 1 MG: 1 TABLET ORAL at 08:16

## 2022-12-03 RX ADMIN — ANASTROZOLE 1 MG: 1 TABLET ORAL at 23:55

## 2022-12-03 RX ADMIN — METOPROLOL SUCCINATE 150 MG: 100 TABLET, EXTENDED RELEASE ORAL at 22:54

## 2022-12-03 RX ADMIN — SODIUM CHLORIDE, PRESERVATIVE FREE 10 ML: 5 INJECTION INTRAVENOUS at 22:12

## 2022-12-03 NOTE — PROGRESS NOTES
Initial Eval         Attending Provider:  Yuki Mccarty MD    Evaluating PT:  Jair Hannah PT    Room #:  0335/1632-J  Diagnosis:  Swollen lymph nodes  Pertinent PMHx/PSHx:  See PMH, 2018 injury LT hip with non-surgical Tx. Procedure/Surgery:  NA  Precautions:  General   Equipment Needs:  None    SUBJECTIVE:    Pt lives with alone in a 1 story home with 2 stairs and 0 rail to enter. Pt ambulated with no AD PTA. Has a limp LTLE 2/2 the 2018 injury to the hip; has a flexion contracture or ? Dx. OBJECTIVE:   Initial Evaluation  Date: 12/3/22 Treatment Short Term/ Long Term   Goals   Was pt agreeable to Eval/treatment? Yes     Does pt have pain? None     Bed Mobility  Rolling: Indep  Supine to sit: indep  Sit to supine: indep  Scooting: indep     Transfers Sit to stand: Indep  Stand to sit: indep  Stand pivot: indep     Ambulation   30 feet with no AD Indep      Stair negotiation: ascended and descended  NA-predict indep based on mobility       ROM See comments     MMT See comments     AM-PAC 6 Clicks 57/29, steps NA but predict 24/24       Pt is A & O x 3   Sensation:  Pt denies numbness and tingling to extremities  Edema:  None  Balance: sitting:Indep and standing: indep w/o any AD  Endurance: Functional    ASSESSMENT:    Comments:  in bed on arrival.  States had old 2018 injury to the LT hip and was seen by multiple ortho and PT's w/o any gain in ROM LT hip; non surgical per Ortho. Has diff with flexing LT hip and is very tight. All mobility out of and into bed was indep. Transfers and mobility w/o AD was antalgic/limp LTLE but indep and safe. No pain expressed in LT hip with the LT hip extensive limp. Treatment:  Patient practiced and was instructed in the following treatment:    Eval       Pt's/ family goals   1. Home    Patient and or family understand(s) diagnosis, prognosis, and plan of care.     PLAN:     No PT services required    Total Treatment Time  0 minutes     Evaluation Time includes thorough review of current medical information, gathering information on past medical history/social history and prior level of function, completion of standardized testing/informal observation of tasks, assessment of data and education on plan of care and goals.     CPT codes:  [x] Low Complexity PT evaluation 44953  [] Moderate Complexity PT evaluation 87037  [] High Complexity PT evaluation 74741  [] PT Re-evaluation 73563  [] Gait training 22357 ** minutes  [] Manual therapy 97277 ** minutes  [] Therapeutic activities 85413 ** minutes  [] Therapeutic exercises 65837 ** minutes  [] Neuromuscular reeducation 29645 ** minutes    Jair Dailey PT

## 2022-12-03 NOTE — H&P
[facility]  History and Physical      CHIEF COMPLAINT:    Chief Complaint   Patient presents with    Other     X 2 days / Sent in by pcp for thrombophlebitis on left arm and left abd        Patient of Phyllis Mauricio MD presents with:  Swollen lymph nodes    History of Present Illness:   Patient is a 52-year-old female with a past medical history of acute CVA, A. fib on anticoagulation, HLD, HTN, left breast cancer treated with chemo which completed in April 2021 reported to be in remission in May 2021 who now takes maintenance chemo daily presents to the emergency room after being instructed by her PCP for thrombophlebitis of the left arm. Patient states for 2 days she has had labs with redness in her left axillary area. She denies any fevers or chills or any drainage from these lumps. CT chest was obtained which revealed inflammatory changes within the subcutaneous fat of the left chest wall extending into the left axillary region. Labs on arrival were relatively unremarkable. Patient was given IV antibiotics in the emergency room and hematology oncology was called and recommendation for admission with general surgery consulted for possible biopsy to rule out malignancy. Patient is admitted to [facility] for further work-up and treatment. REVIEW OF SYSTEMS:  Pertinent negatives are above in HPI. 10 point ROS otherwise negative.       Past Medical History:   Diagnosis Date    Acute CVA (cerebrovascular accident) ([address] 02/10/2018    SARAN (acute kidney injury) ([address] 12/03/2020    Atrial fibrillation with RVR ([address] 12/04/2020    Breast CA ([facility] 12/03/2020    C. difficile diarrhea 01/01/2021    resolved    Cancer ([address] 11/2020    Breast-left and lymph nodes     Chemotherapy adverse reaction 12/03/2020    GI bleed 12/29/2020    resolved    History of 2019 novel coronavirus disease (COVID-19) 12/03/2000    History of blood transfusion 2020     4 units    Hx of blood clots Hyperlipidemia     Hypertension     Leukocytosis 12/03/2020    Osteoarthritis     PAF (paroxysmal atrial fibrillation) ([facility] 02/26/2018    Septic shock with acute organ dysfunction due to Gram positive cocci ([facility] 12/04/2020    Stroke due to embolism of right posterior cerebral artery ([facility] 02/10/2018    TIA (transient ischemic attack) 02/09/2018    Transient cerebral ischemia          Past Surgical History:   Procedure Laterality Date    CERVIX SURGERY      COLONOSCOPY  2010    Negative findings    SAUL US PERQ DEVICE SOFT TISSUE PLMT  FIRST LESION  09/30/2020    SAUL US PERQ DEVICE SOFT TISSUE PLMT  FIRST LESION 9/30/2020 [facility]    PORT SURGERY N/A 11/13/2020    POWER PORT INSERTION, RIGHT SUBCLAVIAN performed by Armando Russell MD at [facility] N/A 12/06/2020    PORT REMOVAL performed by Armando Russell MD at [facility] N/A 1/15/2021    POWER PORT INSERTION performed by Armando Russell MD at [facility] N/ 4/29/2022    MEDIPORT REMOVAL [facility] Russell MD [facility] [address] [place], [place] [address]  [facility]    No shunt; no vegetation    UPPER GASTROINTESTINAL ENDOSCOPY N/A 12/31/2020    EGD ESOPHAGOGASTRODUODENOSCOPY ANTRAL BIOPSY performed by Armando Russell MD at [facility] [address]  09/30/2020     BREAST NEEDLE BIOPSY LEFT 9/30/2020 [facility]    WISDOM TOOTH EXTRACTION         Medications Prior to Admission:    Medications Prior to Admission: metoprolol succinate (TOPROL XL) 50 MG extended release tablet, TAKE 1 TABLET BY MOUTH TWICE A DAY  metoprolol succinate (TOPROL XL) 100 MG extended release tablet, TAKE 1 TABLET BY MOUTH 2 TIMES DAILY TAKES ALONG WITH A 50MG FOR TOTAL OF 150MG TWICE A DAY  potassium chloride (KLOR-CON M) 20 MEQ TBCR extended release tablet, TAKE 2 TABLETS BY MOUTH EVERY DAY  folic acid (FOLVITE) 1 MG tablet, Take 1 mg by mouth daily  Multiple Vitamins-Minerals (THERAPEUTIC MULTIVITAMIN-MINERALS) tablet, Take 1 tablet by mouth daily  anastrozole (ARIMIDEX) 1 MG tablet, Take 1 mg by mouth daily  lisinopril (PRINIVIL;ZESTRIL) 20 MG tablet, Take 20 mg by mouth 2 times daily  magnesium oxide (MAG-OX) 400 MG tablet, Take one tablet twice daily for (5) days, then one tablet daily for (7) days. apixaban (ELIQUIS) 5 MG TABS tablet, Take 5 mg by mouth 2 times daily   atorvastatin (LIPITOR) 40 MG tablet, Take 1 tablet by mouth nightly  calcium carbonate (OSCAL) 500 MG TABS tablet, Take 1 tablet by mouth 2 times daily    Note that the patient's home medications were reviewed and the above list is accurate to the best of my knowledge at the time of the exam.    Allergies:    Adhesive tape    Social History:    reports that she has never smoked. She has never used smokeless tobacco. She reports current alcohol use. She reports that she does not use drugs. Family History:   family history includes Heart Disease in her mother. PHYSICAL EXAM:    Vitals:  /84   Pulse 86   Temp 99.1 °F (37.3 °C) (Oral)   Resp 16   Ht 5' 8\" (1.727 m)   Wt 169 lb (76.7 kg)   SpO2 100%   BMI 25.70 kg/m²       General appearance: NAD, conversant  Eyes: Sclerae anicteric, PERRLA  HEENT: AT/NC, MMM  Neck: FROM, supple, no thyromegaly  Lymph: No cervical / supraclavicular lymphadenopathy, ++ cords on left chest. Nump noted on 2 oclock position of left breast   Lungs: Clear to auscultation, WOB normal  CV: RRR, no MRGs, no lower extremity edema  Abdomen: Soft, non-tender; no masses or HSM, +BS  Extremities: FROM without synovitis. No clubbing or cyanosis of the hands. Skin:     Psych: Calm and cooperative. Normal judgement and insight. Normal mood and affect. Neuro: Alert and interactive, face symmetric, speech fluent. LABS:  All labs reviewed.   Of note:  CBC:   Lab Results   Component Value Date/Time    WBC 6.2 12/02/2022 02:02 PM    RBC 4.68 12/02/2022 02:02 PM    HGB 13.6 12/02/2022 02:02 PM HCT 42.4 12/02/2022 02:02 PM    MCV 90.6 12/02/2022 02:02 PM    MCH 29.1 12/02/2022 02:02 PM    MCHC 32.1 12/02/2022 02:02 PM    RDW 13.5 12/02/2022 02:02 PM     12/02/2022 02:02 PM    MPV 10.8 12/02/2022 02:02 PM     CMP:    Lab Results   Component Value Date/Time     12/03/2022 03:50 AM    K 4.1 12/03/2022 03:50 AM     12/03/2022 03:50 AM    CO2 24 12/03/2022 03:50 AM    BUN 16 12/03/2022 03:50 AM    CREATININE 0.9 12/03/2022 03:50 AM    GFRAA >60 04/03/2021 12:45 PM    LABGLOM >60 12/03/2022 03:50 AM    GLUCOSE 91 12/03/2022 03:50 AM    PROT 6.6 12/02/2022 02:02 PM    LABALBU 3.7 12/02/2022 02:02 PM    CALCIUM 9.7 12/03/2022 03:50 AM    BILITOT 0.5 12/02/2022 02:02 PM    ALKPHOS 233 12/02/2022 02:02 PM    AST 26 12/02/2022 02:02 PM    ALT 33 12/02/2022 02:02 PM       Imaging:  CT Chest: Inflammatory changes especially subcutaneous fat of the lateral left chest wall extending to the left axilla and visualized proximal left arm. Findings could suggest cellulitis or recent blunt trauma. Masslike area of attenuation associated left breast at the 12 o'clock position measuring approximately 2.2 x 1.9 cm seen at axilla image. Numerous nodules are present in both lungs measuring up to 5 mm in left lobe and 3 mm in right lower lobe. Subtle groundglass and irregular interstitial opacities are present in the left which could be scarring versus developing viral pneumonia. EKG:  One not obtained     Telemetry:  I've personally reviewed the patient's telemetry:  Stable     ASSESSMENT/PLAN:  Principal Problem:    Swollen lymph nodes  Resolved Problems:    * No resolved hospital problems.  *    Patient is a 79-year-old female with a hx of L breast CA in remission who is admitted to [facility] for  --Lymphadenopathy with lymphatic streaking of the L axillary region   --Concerning for recurrence/metastatic breast cancer given nodules on ct chest   -Monitor labs  -WBC 6.2  -ABX given in ER  -Check inflammatory markers, CRP 0.7  -General surgery and Hem/onc consulted by ER  -Continue oral chemo   -Possible need for biopsy, await recs from hem/onc and GS  -- await hem on input     Afib on [address]   -Continue home dose of metoprolol and Eliquis  -Monitor for rate control     Medication for other comorbidities continue as appropriate dose adjustment as necessary.     DVT prophylaxis Eliquis   PT OT  Discharge planning      Code status: Full  Requires Inpatient level of care    Osmel Lugo MD

## 2022-12-03 NOTE — PROGRESS NOTES
03:50 AM    CO2 24 12/03/2022 03:50 AM    BUN 16 12/03/2022 03:50 AM    CREATININE 0.9 12/03/2022 03:50 AM    GFRAA >60 04/03/2021 12:45 PM    LABGLOM >60 12/03/2022 03:50 AM    GLUCOSE 91 12/03/2022 03:50 AM    PROT 6.6 12/02/2022 02:02 PM    LABALBU 3.7 12/02/2022 02:02 PM    CALCIUM 9.7 12/03/2022 03:50 AM    BILITOT 0.5 12/02/2022 02:02 PM    ALKPHOS 233 12/02/2022 02:02 PM    AST 26 12/02/2022 02:02 PM    ALT 33 12/02/2022 02:02 PM            CT CHEST W CONTRAST   Final Result   1. Inflammatory changes are associated with subcutaneous fat of lateral left   chest wall extending to left axilla and visualized proximal left arm. Findings could suggest cellulitis or recent blunt trauma. Clinical   correlation recommended. 2. No evidence of soft tissue abscess. 3. Masslike area of attenuation associated with left breast at the 12 o'clock   position measures approximately 2.2 x 1.9 cm seen on axial image number 54,   series: 2. Mammogram with ultrasound recommended for further evaluation. 4. Numerous nodules are present in both lungs measuring up to 5 mm in left   lower lobe and 3 mm in upper lobes and right lower lobe. 5. Subtle ground-glass and irregular interstitial opacities are present in   left lower lobe which could indicate scarring versus developing viral   pneumonia. RECOMMENDATIONS:   Fleischner Society guidelines for follow-up and management of incidentally   detected pulmonary nodules:      Single Solid Nodule:      Nodule size less than 6 mm   In a low-risk patient, no routine follow-up. In a high-risk patient, optional CT at 12 months. Nodule size equals 6-8 mm   In a low-risk patient, CT at 6-12 months, then consider CT at 18-24 months. In a high-risk patient, CT at 6-12 months, then CT at 18-24 months. Nodule size greater than 8 mm         In a low-risk patient, consider CT at 3 months, PET/CT, or tissue sampling.       In a high-risk patient, consider CT at 3 months, PET/CT, or tissue sampling. Multiple Solid Nodules:      Nodule size less than 6 mm   In a low-risk patient, no routine follow-up. In a high-risk patient, optional CT at 12 months. Nodule size equals 6-8 mm   In a low-risk patient, CT at 3-6 months, then consider CT at 18-24 months. In a high-risk patient, CT at 3-6 months, then CT at 18-24 months. Nodule size greater than 8 mm   In a low-risk patient, CT at 3-6 months, then consider CT at 18-24 months. In a high-risk patient, CT at 3-6 months, then CT at 18-24 months. - Low risk patients include individuals with minimal or absent history of   smoking and other known risk factors. - High risk patients include individuals with a history or smoking or known   risk factors. Radiology 2017 http://pubs. rsna.org/doi/full/10.1148/radiol. 1269975079               Current Facility-Administered Medications:     anastrozole (ARIMIDEX) tablet 1 mg, 1 mg, Oral, Daily, Kiley Precise, APRN - CNP    apixaban (ELIQUIS) tablet 5 mg, 5 mg, Oral, BID, Kiley Precise, APRN - CNP    atorvastatin (LIPITOR) tablet 40 mg, 40 mg, Oral, Nightly, Kiley Precise, APRN - CNP    calcium elemental (OSCAL) tablet 500 mg, 500 mg, Oral, BID, Kiley Precise, APRN - CNP    folic acid (FOLVITE) tablet 1 mg, 1 mg, Oral, Daily, Kiley Precise, APRN - CNP, 1 mg at 12/03/22 0816    lisinopril (PRINIVIL;ZESTRIL) tablet 20 mg, 20 mg, Oral, BID, Kiley Precise, APRN - CNP, 20 mg at 12/03/22 0816    sodium chloride flush 0.9 % injection 5-40 mL, 5-40 mL, IntraVENous, 2 times per day, Kiley Precise, APRN - CNP, 10 mL at 12/03/22 0817    sodium chloride flush 0.9 % injection 10 mL, 10 mL, IntraVENous, PRN, Kiley Precise, APRN - CNP    0.9 % sodium chloride infusion, , IntraVENous, PRN, Kiley Precise, APRN - CNP    ondansetron (ZOFRAN-ODT) disintegrating tablet 4 mg, 4 mg, Oral, Q8H PRN **OR** ondansetron (ZOFRAN) injection 4 mg, 4 mg, IntraVENous, Q6H PRN, Katelny Santa Cruz, APRN - CNP    polyethylene glycol (GLYCOLAX) packet 17 g, 17 g, Oral, Daily PRN, LALA Montano CNP    acetaminophen (TYLENOL) tablet 650 mg, 650 mg, Oral, Q6H PRN **OR** acetaminophen (TYLENOL) suppository 650 mg, 650 mg, Rectal, Q6H PRN, LALA Montano - CNP    metoprolol succinate (TOPROL XL) extended release tablet 150 mg, 150 mg, Oral, BID, LALA Montano CNP, 150 mg at 12/03/22 4204    Assessment:    Principal Problem:    Swollen lymph nodes  Resolved Problems:    * No resolved hospital problems. *      60-year-old woman history of stage II ER +20%, CO negative, HER2/sheba positive Awaida quadrant left breast cancer diagnosed October 2020. She completed neoadjuvant chemotherapy with 6 cycles of Taxotere, carboplatin, Herceptin and Perjeta April 2021. She then had a left lumpectomy and sentinel lymph node biopsy and completed maintenance Herceptin. She began anastrozole September 2021. She now presents with redness, nodularity extending from the left axilla down the left arm and left flank. Redness resolved with 1 dose of Ancef. Possibly Mondors disease versus phlebitis versus unlikely but unusual recurrent disease. Nodule in the left breast may be scar tissue. We can see if we can do an ultrasound mammogram on the hospital.  Mammogram March 2022 was unremarkable. Pulmonary nodules very small in size. May be infectious or inflammatory in nature. Plan:  -Patient can continue on the anastrozole 1 mg p.o. daily. - Check a CA 27-29.  - Surgery to evaluate area. - Ultrasound left breast.  - Repeat CT scan in 3 months to assess for resolution or stability of the pulmonary nodules. Continue following with you.       Electronically signed by Kimberlee Arriaga MD on 12/3/2022 at 3:54 PM

## 2022-12-04 PROCEDURE — 36415 COLL VENOUS BLD VENIPUNCTURE: CPT

## 2022-12-04 PROCEDURE — 2580000003 HC RX 258: Performed by: NURSE PRACTITIONER

## 2022-12-04 PROCEDURE — 2060000000 HC ICU INTERMEDIATE R&B

## 2022-12-04 PROCEDURE — 6370000000 HC RX 637 (ALT 250 FOR IP): Performed by: NURSE PRACTITIONER

## 2022-12-04 PROCEDURE — 86300 IMMUNOASSAY TUMOR CA 15-3: CPT

## 2022-12-04 RX ADMIN — LISINOPRIL 20 MG: 20 TABLET ORAL at 20:34

## 2022-12-04 RX ADMIN — LISINOPRIL 20 MG: 20 TABLET ORAL at 09:21

## 2022-12-04 RX ADMIN — METOPROLOL SUCCINATE 150 MG: 100 TABLET, EXTENDED RELEASE ORAL at 09:21

## 2022-12-04 RX ADMIN — SODIUM CHLORIDE, PRESERVATIVE FREE 10 ML: 5 INJECTION INTRAVENOUS at 20:34

## 2022-12-04 RX ADMIN — FOLIC ACID 1 MG: 1 TABLET ORAL at 09:21

## 2022-12-04 RX ADMIN — ATORVASTATIN CALCIUM 40 MG: 40 TABLET, FILM COATED ORAL at 20:33

## 2022-12-04 RX ADMIN — METOPROLOL SUCCINATE 150 MG: 100 TABLET, EXTENDED RELEASE ORAL at 20:33

## 2022-12-04 RX ADMIN — ANASTROZOLE 1 MG: 1 TABLET ORAL at 20:32

## 2022-12-04 ASSESSMENT — PAIN SCALES - GENERAL
PAINLEVEL_OUTOF10: 0
PAINLEVEL_OUTOF10: 0

## 2022-12-04 ASSESSMENT — PAIN DESCRIPTION - FREQUENCY: FREQUENCY: INTERMITTENT

## 2022-12-04 ASSESSMENT — PAIN - FUNCTIONAL ASSESSMENT: PAIN_FUNCTIONAL_ASSESSMENT: ACTIVITIES ARE NOT PREVENTED

## 2022-12-04 ASSESSMENT — PAIN DESCRIPTION - ONSET: ONSET: ON-GOING

## 2022-12-04 NOTE — CONSULTS
General Surgery Consult    Patient's Name/Date of Birth: Miller Sever / 1955    Date: December 4, 2022     Consulting Surgeon: Nicolás Reyes M.D.    PCP: Sarwat Flores MD     Chief Complaint: left axillary pain    HPI:   Miller Sever is a 78 y.o. female who presents for evaluation of left axillary pain. Patient had redness and swelling of left axilla a few days ago. She was sent to the ER for evaluation. Patient had RUE U/S showing superficial thrombophlebitis, no DVT. She has a history of left breast cancer s/p lumpectomy and SLNB. No fevers, chills, chest pain, SOB or cough.       Past Medical History:   Diagnosis Date    Acute CVA (cerebrovascular accident) ([address] 02/10/2018    SARAN (acute kidney injury) ([address] 12/03/2020    Atrial fibrillation with RVR ([address] 12/04/2020    Breast CA ([address] 12/03/2020    C. difficile diarrhea 01/01/2021    resolved    Cancer ([address] 11/2020    Breast-left and lymph nodes     Chemotherapy adverse reaction 12/03/2020    GI bleed 12/29/2020    resolved    History of 2019 novel coronavirus disease (COVID-19) 12/03/2000    History of blood transfusion 2020     4 units    Hx of blood clots     Hyperlipidemia     Hypertension     Leukocytosis 12/03/2020    Osteoarthritis     PAF (paroxysmal atrial fibrillation) ([address] 02/26/2018    Septic shock with acute organ dysfunction due to Gram positive cocci ([address] 12/04/2020    Stroke due to embolism of right posterior cerebral artery ([address] 02/10/2018    TIA (transient ischemic attack) 02/09/2018    Transient cerebral ischemia        Past Surgical History:   Procedure Laterality Date    CERVIX SURGERY      COLONOSCOPY  2010    Negative findings    SAUL US PERQ DEVICE SOFT TISSUE PLMT  FIRST LESION  09/30/2020    SAUL US PERQ DEVICE SOFT TISSUE PLMT  FIRST LESION 9/30/2020 [facility]    PORT SURGERY N/A 11/13/2020    POWER PORT INSERTION, RIGHT SUBCLAVIAN performed by Abraham Stewart MD at [place] [address] 12/06/2020    PORT REMOVAL performed by Liset Giles MD at [facility] N/A 1/15/2021    POWER PORT INSERTION performed by Liset Giles MD at [facility] N/A 4/29/2022    MEDIPORT [facility]  05/2018    No shunt; no vegetation    UPPER GASTROINTESTINAL ENDOSCOPY N/A 12/31/2020    EGD ESOPHAGOGASTRODUODENOSCOPY ANTRAL BIOPSY performed by Liset Giles MD at [facility] LEFT  09/30/2020    US BREAST NEEDLE BIOPSY LEFT 9/30/2020 [facility]    WISDOM TOOTH EXTRACTION         Current Facility-Administered Medications   Medication Dose Route Frequency Provider Last Rate Last Admin    anastrozole (ARIMIDEX) tablet 1 mg  1 mg Oral Daily LALA Gomez CNP   1 mg at 12/03/22 2355    apixaban (ELIQUIS) tablet 5 mg  5 mg Oral BID LALA Gomez CNP        atorvastatin (LIPITOR) tablet 40 mg  40 mg Oral Nightly LALA Gomez CNP   40 mg at 12/03/22 2210    calcium elemental (OSCAL) tablet 500 mg  500 mg Oral BID LALA Gomez CNP        folic acid (FOLVITE) tablet 1 mg  1 mg Oral Daily LALA Gomez CNP   1 mg at 12/04/22 0921    lisinopril (PRINIVIL;ZESTRIL) tablet 20 mg  20 mg Oral BID LALA Gomez - CNP   20 mg at 12/04/22 1489    sodium chloride flush 0.9 % injection 5-40 mL  5-40 mL IntraVENous 2 times per day LALA Gomez CNP   10 mL at 12/03/22 2212    sodium chloride flush 0.9 % injection 10 mL  10 mL IntraVENous PRN LALA Gomez CNP        0.9 % sodium chloride infusion   IntraVENous PRN LALA Gomez CNP        ondansetron (ZOFRAN-ODT) disintegrating tablet 4 mg  4 mg Oral Q8H PRN LALA Gomez CNP        Or    ondansetron (ZOFRAN) injection 4 mg  4 mg IntraVENous Q6H PRN LALA Gomez CNP        polyethylene glycol (GLYCOLAX) packet 17 g  17 g Oral Daily PRN Evan Loth, APRN - CNP        acetaminophen (TYLENOL) tablet 650 mg  650 mg Oral Q6H PRN Evan Loth, APRN - CNP        Or    acetaminophen (TYLENOL) suppository 650 mg  650 mg Rectal Q6H PRN Evan Loth, APRN - CNP        metoprolol succinate (TOPROL XL) extended release tablet 150 mg  150 mg Oral BID Evan Loth, APRN - CNP   150 mg at 12/04/22 4712       Allergies   Allergen Reactions    Adhesive Tape Rash       Family History   Problem Relation Age of Onset    Heart Disease Mother        Social History     Socioeconomic History    Marital status:      Spouse name: Not on file    Number of children: Not on file    Years of education: Not on file    Highest education level: Not on file   Occupational History    Not on file   Tobacco Use    Smoking status: Never    Smokeless tobacco: Never   Vaping Use    Vaping Use: Never used   Substance and Sexual Activity    Alcohol use: Yes     Comment: rare    Drug use: Never    Sexual activity: Never   Other Topics Concern    Not on file   Social History Narrative    Not on file     Social Determinants of Health     Financial Resource Strain: Not on file   Food Insecurity: Not on file   Transportation Needs: Not on file   Physical Activity: Not on file   Stress: Not on file   Social Connections: Not on file   Intimate Partner Violence: Not on file   Housing Stability: Not on file           Review of Systems  Negative times ten except what was stated in HPI and PMH    Physical exam:   BP (!) 147/89   Pulse 96   Temp 97.9 °F (36.6 °C) (Oral)   Resp 16   Ht 5' 8\" (1.727 m)   Wt 169 lb (76.7 kg)   SpO2 99%   BMI 25.70 kg/m²   General appearance: AAOx3, NAD  Head: NCAT, PERRLA, EOMI, red conjunctiva  Neck: supple, no masses  Lungs: CTAB, equal chest rise bilateral  Heart: Reg rate  Abdomen: soft, nontender, no guarding/rebound, nondistended, no palpable hernias or defects  Skin: no lesions  Gu: no cva tenderness  Extremities: extremities normal, atraumatic, no cyanosis or edema  Left axillary lymphadenopathy palpable  Left breast 12 o'clock palpable lump    Labs:   Latest Reference Range & Units 12/2/22 14:02 12/3/22 03:50   Sodium 132 - 146 mmol/L 143 140   Potassium 3.5 - 5.0 mmol/L 4.5 4.1   Chloride 98 - 107 mmol/L 107 104   CO2 22 - 29 mmol/L 26 24   BUN,BUNPL 6 - 23 mg/dL 14 16   Creatinine 0.5 - 1.0 mg/dL 0.9 0.9   Anion Gap 7 - 16 mmol/L 10 12   Est, Glom Filt Rate >=60 mL/min/1.73 >60 >60   Glucose, Random 74 - 99 mg/dL 109 (H) 91   CALCIUM, SERUM, [id number] 8.6 - 10.2 mg/dL 9.4 9.7   Total Protein 6.4 - 8.3 g/dL 6.6    CRP 0.0 - 0.4 mg/dL 0.7 (H)    Albumin 3.5 - 5.2 g/dL 3.7    Alk Phos 35 - 104 U/L 233 (H)    ALT 0 - 32 U/L 33 (H)    AST 0 - 31 U/L 26    Bilirubin 0.0 - 1.2 mg/dL 0.5    WBC 4.5 - 11.5 E9/L 6.2    RBC 3.50 - 5.50 E12/L 4.68    Hemoglobin Quant 11.5 - 15.5 g/dL 13.6    Hematocrit 34.0 - 48.0 % 42.4    MCV 80.0 - 99.9 fL 90.6    MCH 26.0 - 35.0 pg 29.1    MCHC 32.0 - 34.5 % 32.1    MPV 7.0 - 12.0 fL 10.8    RDW 11.5 - 15.0 fL 13.5    Platelet Count 108 - 450 E9/L 180    Neutrophils % 43.0 - 80.0 % 71.8    Immature Granulocytes % 0.0 - 5.0 % 0.3    Lymphocyte % 20.0 - 42.0 % 16.8 (L)    Monocytes % 2.0 - 12.0 % 9.8    Eosinophils % 0.0 - 6.0 % 1.0    Basophils % 0.0 - 2.0 % 0.3    Neutrophils Absolute 1.80 - 7.30 E9/L 4.45    Immature Granulocytes # E9/L 0.02    Lymphocytes Absolute 1.50 - 4.00 E9/L 1.04 (L)    Monocytes Absolute 0.10 - 0.95 E9/L 0.61    Eosinophils Absolute 0.05 - 0.50 E9/L 0.06    Basophils Absolute 0.00 - 0.20 E9/L 0.02    (H): Data is abnormally high  (L): Data is abnormally low    Radiology:  CT chest:  Impression   1. Inflammatory changes are associated with subcutaneous fat of lateral left   chest wall extending to left axilla and visualized proximal left arm. Findings could suggest cellulitis or recent blunt trauma. Clinical   correlation recommended. 2. No evidence of soft tissue abscess.    3. Masslike area of attenuation associated with left breast at the 12 o'clock   position measures approximately 2.2 x 1.9 cm seen on axial image number 54,   series: 2. Mammogram with ultrasound recommended for further evaluation. 4. Numerous nodules are present in both lungs measuring up to 5 mm in left   lower lobe and 3 mm in upper lobes and right lower lobe. 5. Subtle ground-glass and irregular interstitial opacities are present in   left lower lobe which could indicate scarring versus developing viral   pneumonia. Left breast U/S:  Impression   1. Spiculated hypoechoic area in the left breast 12 o'clock position. This   is identified in an area of previous spiculated mass which underwent   resection in 2020. Although the appearance is most suggestive of scarring on   today's exam, recurrent disease in this area cannot be excluded on this   imaging modality. Please see recommendations below. 2.  Surrounding soft tissues appear unremarkable. Assessment:  78 y.o. female with history of left breast cancer s/p lumpectomy/SLNB presents with new left breast lump and left axillary LAD    Plan:   Will need biopsy of left breast mass as well as left axillary LAD  Hold anticoagulation        Lang Miranda MD  12/4/2022  10:03 AM

## 2022-12-04 NOTE — PROGRESS NOTES
Pt was questioning if she would be seen by general surgery today because ED told pt that biopsy would be completed today. Dr. Misael Buchanan covering and states Dr. Raimundo Trejo should round on her today if not then Dr. Misael Buchanan will evaluate tomorrow. Nurse updated pt and told her it's ok to eat because biopsy is less likely to be done today.

## 2022-12-04 NOTE — PROGRESS NOTES
[facility]   Progress note      Subjective: The patient is awake and alert. Resting denies acute complaints     Objective:    BP (!) 147/89   Pulse 96   Temp 97.9 °F (36.6 °C) (Oral)   Resp 16   Ht 5' 8\" (1.727 m)   Wt 169 lb (76.7 kg)   SpO2 99%   BMI 25.70 kg/m²     In: 200 [P.O.:200]  Out: -   In: 200   Out: -     General appearance: NAD, conversant  HEENT: AT/NC, MMM  Neck: FROM, supple  Lungs: Clear to auscultation  CV: RRR, no MRGs  Vasc: Radial pulses 2+  Abdomen: Soft, non-tender; no masses or HSM  Extremities: No peripheral edema or digital cyanosis  Skin: no rash, lesions or ulcers  Psych: Alert and oriented to person, place and time  Neuro: Alert and interactive     Recent Labs     12/02/22  1402   WBC 6.2   HGB 13.6   HCT 42.4          Recent Labs     12/02/22  1402 12/03/22  0350    140   K 4.5 4.1    104   CO2 26 24   BUN 14 16   CREATININE 0.9 0.9   CALCIUM 9.4 9.7       Assessment:    Principal Problem:    Swollen lymph nodes  Resolved Problems:    * No resolved hospital problems.  *      Plan:    Patient is a 52-year-old female with a hx of L breast CA in remission who is admitted to [facility] for  --Lymphadenopathy with lymphatic streaking of the L axillary region   --Concerning for recurrence/metastatic breast cancer given nodules on ct chest   -Monitor labs  -WBC 6.2  -ABX given in ER  -Check inflammatory markers, CRP 0.7  -General surgery and Hem/onc consulted by ER  -Continue oral chemo   -Possible need for biopsy, await recs from hem/onc and   -- await hem on input      Af[address] on [address]   -Continue home dose of metoprolol and Eliquis  -Monitor for rate control    12/04/22  Hold oac in prep for biopsy   Us left breast with spiculated hyperechoic - same areas previous mass - management per oncology   Am labs   Dc plan once bx completed   Continue po arimidex for now        Code Status:    Consultants:    DVT Prophylaxis   PT/OT  Discharge Wil Vanessa MD  10:38 AM  12/4/2022

## 2022-12-04 NOTE — PROGRESS NOTES
Progress Note    Subjective:  Patient had the breast ultrasound. There is an irregular masslike lesion 1.8 x 1.6 x 1.3 cm tethering the surrounding tissue may be scar tissue versus possible recurrence. Further testing recommended. Surgeon had seen the patient today and will arrange for biopsy of the breast lesion as well as a lymph node. Patient is feeling okay. No redness or warmth. Still has the nodularity and streaking from the left axilla radiating down the left arm and down the left flank. Objective:    /82   Pulse 86   Temp 97.9 °F (36.6 °C) (Oral)   Resp 16   Ht 5' 8\" (1.727 m)   Wt 169 lb (76.7 kg)   SpO2 100%   BMI 25.70 kg/m²     General: Pleasant woman no apparent distress  HEENT: PERRLA anicteric sclera, oropharynx is clear  Heart:  RRR, no murmurs, gallops, or rubs. Lungs:  CTA bilaterally, no wheeze, rales or rhonchi  Abd: bowel sounds present, nontender, nondistended, no masses  Extrem: Subcutaneous nodularity linear fashion extending on the left arm medially as well as from the left axilla and down the left flank.     CBC:   Lab Results   Component Value Date/Time    WBC 6.2 12/02/2022 02:02 PM    RBC 4.68 12/02/2022 02:02 PM    HGB 13.6 12/02/2022 02:02 PM    HCT 42.4 12/02/2022 02:02 PM    MCV 90.6 12/02/2022 02:02 PM    MCH 29.1 12/02/2022 02:02 PM    MCHC 32.1 12/02/2022 02:02 PM    RDW 13.5 12/02/2022 02:02 PM     12/02/2022 02:02 PM    MPV 10.8 12/02/2022 02:02 PM     CMP:    Lab Results   Component Value Date/Time     12/03/2022 03:50 AM    K 4.1 12/03/2022 03:50 AM     12/03/2022 03:50 AM    CO2 24 12/03/2022 03:50 AM    BUN 16 12/03/2022 03:50 AM    CREATININE 0.9 12/03/2022 03:50 AM    GFRAA >60 04/03/2021 12:45 PM    LABGLOM >60 12/03/2022 03:50 AM    GLUCOSE 91 12/03/2022 03:50 AM    PROT 6.6 12/02/2022 02:02 PM    LABALBU 3.7 12/02/2022 02:02 PM    CALCIUM 9.7 12/03/2022 03:50 AM    BILITOT 0.5 12/02/2022 02:02 PM    ALKPHOS 233 12/02/2022 02:02 PM    AST 26 12/02/2022 02:02 PM    ALT 33 12/02/2022 02:02 PM        CA 27-29 is pending. US BREAST COMPLETE LEFT   Final Result   1. Spiculated hypoechoic area in the left breast 12 o'clock position. This   is identified in an area of previous spiculated mass which underwent   resection in 2020. Although the appearance is most suggestive of scarring on   today's exam, recurrent disease in this area cannot be excluded on this   imaging modality. Please see recommendations below. 2.  Surrounding soft tissues appear unremarkable. BIRADS:   BIRADS - CATEGORY 0      Incomplete: Needs Additional Imaging Evaluation      OVERALL ASSESSMENT - INCOMPLETE:NEED ADDITIONAL IMAGING EVALUATION. Recommendation: Recommend complete diagnostic mammogram to include comparison   to prior exams to assess for interval change. If there is suspicion on the   complete diagnostic mammogram for a new lesion or recurrence in this area,   MRI of the breast may be useful. CT CHEST W CONTRAST   Final Result   1. Inflammatory changes are associated with subcutaneous fat of lateral left   chest wall extending to left axilla and visualized proximal left arm. Findings could suggest cellulitis or recent blunt trauma. Clinical   correlation recommended. 2. No evidence of soft tissue abscess. 3. Masslike area of attenuation associated with left breast at the 12 o'clock   position measures approximately 2.2 x 1.9 cm seen on axial image number 54,   series: 2. Mammogram with ultrasound recommended for further evaluation. 4. Numerous nodules are present in both lungs measuring up to 5 mm in left   lower lobe and 3 mm in upper lobes and right lower lobe. 5. Subtle ground-glass and irregular interstitial opacities are present in   left lower lobe which could indicate scarring versus developing viral   pneumonia.       RECOMMENDATIONS:   Fleischner Society guidelines for follow-up and management of incidentally detected pulmonary nodules:      Single Solid Nodule:      Nodule size less than 6 mm   In a low-risk patient, no routine follow-up. In a high-risk patient, optional CT at 12 months. Nodule size equals 6-8 mm   In a low-risk patient, CT at 6-12 months, then consider CT at 18-24 months. In a high-risk patient, CT at 6-12 months, then CT at 18-24 months. Nodule size greater than 8 mm         In a low-risk patient, consider CT at 3 months, PET/CT, or tissue sampling. In a high-risk patient, consider CT at 3 months, PET/CT, or tissue sampling. Multiple Solid Nodules:      Nodule size less than 6 mm   In a low-risk patient, no routine follow-up. In a high-risk patient, optional CT at 12 months. Nodule size equals 6-8 mm   In a low-risk patient, CT at 3-6 months, then consider CT at 18-24 months. In a high-risk patient, CT at 3-6 months, then CT at 18-24 months. Nodule size greater than 8 mm   In a low-risk patient, CT at 3-6 months, then consider CT at 18-24 months. In a high-risk patient, CT at 3-6 months, then CT at 18-24 months. - Low risk patients include individuals with minimal or absent history of   smoking and other known risk factors. - High risk patients include individuals with a history or smoking or known   risk factors. Radiology 2017 http://pubs. rsna.org/doi/full/10.1148/radiol. 0109142283               Current Facility-Administered Medications:     anastrozole (ARIMIDEX) tablet 1 mg, 1 mg, Oral, Daily, LALA Bhakta CNP, 1 mg at 12/03/22 2355    apixaban (ELIQUIS) tablet 5 mg, 5 mg, Oral, BID, LALA Bhakta CNP    atorvastatin (LIPITOR) tablet 40 mg, 40 mg, Oral, Nightly, LALA Bhakta CNP, 40 mg at 12/03/22 2210    calcium elemental (OSCAL) tablet 500 mg, 500 mg, Oral, BID, LALA Bhakta CNP    folic acid (FOLVITE) tablet 1 mg, 1 mg, Oral, Daily, LALA Bhakta CNP, 1 mg at 12/04/22 0921    lisinopril (PRINIVIL;ZESTRIL) tablet 20 mg, 20 mg, Oral, BID, Elizebeth Mali, APRN - CNP, 20 mg at 12/04/22 1439    sodium chloride flush 0.9 % injection 5-40 mL, 5-40 mL, IntraVENous, 2 times per day, Elizebeth Mali, APRN - CNP, 10 mL at 12/03/22 2212    sodium chloride flush 0.9 % injection 10 mL, 10 mL, IntraVENous, PRN, Elizebeth Mali, APRN - CNP    0.9 % sodium chloride infusion, , IntraVENous, PRN, Elizebeth Mali, APRN - CNP    ondansetron (ZOFRAN-ODT) disintegrating tablet 4 mg, 4 mg, Oral, Q8H PRN **OR** ondansetron (ZOFRAN) injection 4 mg, 4 mg, IntraVENous, Q6H PRN, Elizebeth Mali, APRN - CNP    polyethylene glycol (GLYCOLAX) packet 17 g, 17 g, Oral, Daily PRN, Elizebeth Mali, APRN - CNP    acetaminophen (TYLENOL) tablet 650 mg, 650 mg, Oral, Q6H PRN **OR** acetaminophen (TYLENOL) suppository 650 mg, 650 mg, Rectal, Q6H PRN, Elizebeth Mali, APRN - CNP    metoprolol succinate (TOPROL XL) extended release tablet 150 mg, 150 mg, Oral, BID, Elizebeth Mali, APRN - CNP, 150 mg at 12/04/22 7303    Assessment:    Principal Problem:    Swollen lymph nodes  Resolved Problems:    * No resolved hospital problems. *      80-year-old woman history of stage II ER +20%, [place] negative, HER2/sheba positive Awaida quadrant left breast cancer diagnosed October 2020. She completed neoadjuvant chemotherapy with 6 cycles of Taxotere, carboplatin, Herceptin and Perjeta April 2021. She then had a left lumpectomy and sentinel lymph node biopsy and completed maintenance Herceptin. She began anastrozole September 2021. She now presents with redness, nodularity extending from the left axilla down the left arm and left flank. Redness resolved with 1 dose of Ancef. 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 Nodule in the left breast may be scar tissue. Mammogram March 2022 was unremarkable. Ultrasound from yesterday reviewed and need biopsy to rule out scar tissue versus recurrent disease.   Pulmonary nodules very small in size. May be infectious or inflammatory in nature. Plan:  -Continue on the hormone blocking medication anastrozole 1 mg p.o. daily. - CA 27-29 is pending.  - Surgery to biopsy left breast lesion and axillary lymph node. - Repeat CT scan in 3 months to assess for resolution or stability of the pulmonary nodules.         Electronically signed by Gerry England MD on 12/4/2022 at 2:53 PM

## 2022-12-05 VITALS
OXYGEN SATURATION: 95 % | HEIGHT: 68 IN | HEART RATE: 87 BPM | RESPIRATION RATE: 16 BRPM | SYSTOLIC BLOOD PRESSURE: 144 MMHG | DIASTOLIC BLOOD PRESSURE: 98 MMHG | BODY MASS INDEX: 25.61 KG/M2 | WEIGHT: 169 LBS | TEMPERATURE: 97.6 F

## 2022-12-05 LAB
ANION GAP SERPL CALCULATED.3IONS-SCNC: 9 MMOL/L (ref 7–16)
BASOPHILS ABSOLUTE: 0.02 E9/L (ref 0–0.2)
BASOPHILS RELATIVE PERCENT: 0.3 % (ref 0–2)
BUN BLDV-MCNC: 20 MG/DL (ref 6–23)
CALCIUM SERPL-MCNC: 9.8 MG/DL (ref 8.6–10.2)
CHLORIDE BLD-SCNC: 105 MMOL/L (ref 98–107)
CO2: 27 MMOL/L (ref 22–29)
CREAT SERPL-MCNC: 1 MG/DL (ref 0.5–1)
EOSINOPHILS ABSOLUTE: 0.14 E9/L (ref 0.05–0.5)
EOSINOPHILS RELATIVE PERCENT: 2.2 % (ref 0–6)
GFR SERPL CREATININE-BSD FRML MDRD: >60 ML/MIN/1.73
GLUCOSE BLD-MCNC: 106 MG/DL (ref 74–99)
HCT VFR BLD CALC: 42 % (ref 34–48)
HEMOGLOBIN: 13.1 G/DL (ref 11.5–15.5)
IMMATURE GRANULOCYTES #: 0.02 E9/L
IMMATURE GRANULOCYTES %: 0.3 % (ref 0–5)
LYMPHOCYTES ABSOLUTE: 1.52 E9/L (ref 1.5–4)
LYMPHOCYTES RELATIVE PERCENT: 23.5 % (ref 20–42)
MCH RBC QN AUTO: 28 PG (ref 26–35)
MCHC RBC AUTO-ENTMCNC: 31.2 % (ref 32–34.5)
MCV RBC AUTO: 89.7 FL (ref 80–99.9)
MONOCYTES ABSOLUTE: 0.79 E9/L (ref 0.1–0.95)
MONOCYTES RELATIVE PERCENT: 12.2 % (ref 2–12)
NEUTROPHILS ABSOLUTE: 3.99 E9/L (ref 1.8–7.3)
NEUTROPHILS RELATIVE PERCENT: 61.5 % (ref 43–80)
PDW BLD-RTO: 13.5 FL (ref 11.5–15)
PLATELET # BLD: 180 E9/L (ref 130–450)
PMV BLD AUTO: 10.9 FL (ref 7–12)
POTASSIUM SERPL-SCNC: 4.5 MMOL/L (ref 3.5–5)
RBC # BLD: 4.68 E12/L (ref 3.5–5.5)
SODIUM BLD-SCNC: 141 MMOL/L (ref 132–146)
WBC # BLD: 6.5 E9/L (ref 4.5–11.5)

## 2022-12-05 PROCEDURE — 36415 COLL VENOUS BLD VENIPUNCTURE: CPT

## 2022-12-05 PROCEDURE — 6370000000 HC RX 637 (ALT 250 FOR IP): Performed by: NURSE PRACTITIONER

## 2022-12-05 PROCEDURE — 97165 OT EVAL LOW COMPLEX 30 MIN: CPT

## 2022-12-05 PROCEDURE — 80048 BASIC METABOLIC PNL TOTAL CA: CPT

## 2022-12-05 PROCEDURE — 2580000003 HC RX 258: Performed by: NURSE PRACTITIONER

## 2022-12-05 PROCEDURE — 85025 COMPLETE CBC W/AUTO DIFF WBC: CPT

## 2022-12-05 RX ORDER — CEPHALEXIN 500 MG/1
500 CAPSULE ORAL 4 TIMES DAILY
Qty: 20 CAPSULE | Refills: 0 | Status: SHIPPED | OUTPATIENT
Start: 2022-12-05 | End: 2022-12-10

## 2022-12-05 RX ADMIN — SODIUM CHLORIDE, PRESERVATIVE FREE 10 ML: 5 INJECTION INTRAVENOUS at 10:40

## 2022-12-05 RX ADMIN — LISINOPRIL 20 MG: 20 TABLET ORAL at 10:39

## 2022-12-05 RX ADMIN — ANASTROZOLE 1 MG: 1 TABLET ORAL at 10:39

## 2022-12-05 RX ADMIN — METOPROLOL SUCCINATE 150 MG: 100 TABLET, EXTENDED RELEASE ORAL at 10:39

## 2022-12-05 RX ADMIN — APIXABAN 5 MG: 5 TABLET, FILM COATED ORAL at 10:39

## 2022-12-05 RX ADMIN — FOLIC ACID 1 MG: 1 TABLET ORAL at 10:39

## 2022-12-05 NOTE — DISCHARGE SUMMARY
[facility]   Discharge summary   Patient ID:  Reg Logan  [id number]  78 y.o.  1955    Admit date: 12/2/2022    Discharge date and time: 12/5/2022    Admission Diagnoses:   Patient Active Problem List   Diagnosis    TIA (transient ischemic attack)    Acute CVA (cerebrovascular accident) ([facility] [address]    Transient cerebral ischemia    PAF (paroxysmal atrial fibrillation) ([facility] [address]    Breast CA ([facility] [address]    SARAN (acute kidney injury) ([facility] [address]    Chemotherapy adverse reaction    Leukocytosis    Septic shock with acute organ dysfunction due to Gram positive cocci (HCC)    COVID-19    Atrial fibrillation with RVR (HCC)    GI bleed    History of 2019 novel coronavirus disease (COVID-19)    C. difficile diarrhea    Hypertension    Stroke due to embolism of right posterior cerebral artery (HCC)    Swollen lymph nodes       Discharge Diagnoses: Thrombophlebitis     Consults: hematology/oncology and general surgery    Procedures: none    Hospital Course:     Patient is a 67-year-old female with a hx of L breast CA in remission who is admitted to [facility] for  --Lymphadenopathy with lymphatic streaking of the L axillary region   --Concerning for recurrence/metastatic breast cancer given nodules on ct chest   -Monitor labs  -WBC 6.2  -ABX given in ER  -Check inflammatory markers, CRP 0.7  -General surgery and Hem/onc consulted by ER  -Continue oral chemo   -Possible need for biopsy, await recs from hem/onc and GS  -- await hem on input      Afib on [address]   -Continue home dose of metoprolol and Eliquis  -Monitor for rate control     12/04/22  Hold oac in prep for biopsy   Us left breast with spiculated hyperechoic - same areas previous mass - management per oncology   Am labs   Dc plan once bx completed   Continue po arimidex for now      12/5/22:  No biopsy needed as presumed to be thrombophlebitis   Supportive care with NSAIDs and heat.    Follow up with hem/onc and pcp outpatient     Recent Labs     12/02/22  1402 12/05/22 0312   WBC 6.2 6.5   HGB 13.6 13.1   HCT 42.4 42.0    180       Recent Labs     12/02/22  1402 12/03/22  0350 12/05/22 0312    140 141   K 4.5 4.1 4.5    104 105   CO2 26 24 27   BUN 14 16 20   CREATININE 0.9 0.9 1.0   CALCIUM 9.4 9.7 9.8       CT CHEST W CONTRAST    Result Date: 12/2/2022  EXAMINATION: CT OF THE CHEST WITH CONTRAST 12/2/2022 3:34 pm TECHNIQUE: CT of the chest was performed with the administration of intravenous contrast. Multiplanar reformatted images are provided for review. Automated exposure control, iterative reconstruction, and/or weight based adjustment of the mA/kV was utilized to reduce the radiation dose to as low as reasonably achievable. COMPARISON: None. HISTORY: ORDERING SYSTEM PROVIDED HISTORY: left lower chest  and left under arm swollen lump/ lymph nodes TECHNOLOGIST PROVIDED HISTORY: Reason for exam:->left lower chest  and left under arm swollen lump/ lymph nodes Decision Support Exception - unselect if not a suspected or confirmed emergency medical condition->Emergency Medical Condition (MA) FINDINGS: Inflammatory changes are associated with the subcutaneous fat of lateral left chest extending superiorly to level of left axilla and visualized proximal left arm. No loculated fluid collections to suggest abscess. No lymphadenopathy. No evidence of soft tissue mass. Postsurgical changes are present at level of left axilla with multiple surgical clips. Masslike opacity associated with left breast appearing at the 12 o'clock position measures approximately 2.2 x 1.9 cm on axial image number 54, series: 2. The heart is normal in size. No pericardial effusion. Few nonenlarged mediastinal lymph nodes are present notable in lower paratracheal location measuring up to 1.3 x 1.2 cm. Subtle ground-glass and irregular opacities are present in left lower lobe.   There are multiple nodules scattered in both lungs measuring up to 5 mm in left lower lobe and 3 mm in upper lobes and right lower lobe. 1. Inflammatory changes are associated with subcutaneous fat of lateral left chest wall extending to left axilla and visualized proximal left arm. Findings could suggest cellulitis or recent blunt trauma. Clinical correlation recommended. 2. No evidence of soft tissue abscess. 3. Masslike area of attenuation associated with left breast at the 12 o'clock position measures approximately 2.2 x 1.9 cm seen on axial image number 54, series: 2. Mammogram with ultrasound recommended for further evaluation. 4. Numerous nodules are present in both lungs measuring up to 5 mm in left lower lobe and 3 mm in upper lobes and right lower lobe. 5. Subtle ground-glass and irregular interstitial opacities are present in left lower lobe which could indicate scarring versus developing viral pneumonia. RECOMMENDATIONS: Fleischner Society guidelines for follow-up and management of incidentally detected pulmonary nodules: Single Solid Nodule: Nodule size less than 6 mm In a low-risk patient, no routine follow-up. In a high-risk patient, optional CT at 12 months. Nodule size equals 6-8 mm In a low-risk patient, CT at 6-12 months, then consider CT at 18-24 months. In a high-risk patient, CT at 6-12 months, then CT at 18-24 months. Nodule size greater than 8 mm In a low-risk patient, consider CT at 3 months, PET/CT, or tissue sampling. In a high-risk patient, consider CT at 3 months, PET/CT, or tissue sampling. Multiple Solid Nodules: Nodule size less than 6 mm In a low-risk patient, no routine follow-up. In a high-risk patient, optional CT at 12 months. Nodule size equals 6-8 mm In a low-risk patient, CT at 3-6 months, then consider CT at 18-24 months. In a high-risk patient, CT at 3-6 months, then CT at 18-24 months. Nodule size greater than 8 mm In a low-risk patient, CT at 3-6 months, then consider CT at 18-24 months. In a high-risk patient, CT at 3-6 months, then CT at 18-24 months.  - Low risk patients include individuals with minimal or absent history of smoking and other known risk factors. - High risk patients include individuals with a history or smoking or known risk factors. Radiology 2017 http://pubs. rsna.org/doi/full/10.1148/radiol. 7149143676     US BREAST COMPLETE LEFT    Result Date: 12/3/2022  EXAMINATION: TARGETED ULTRASOUND OF THE LEFT BREAST 12/3/2022 COMPARISON: CT chest from December 2, 2022 HISTORY: ORDERING SYSTEM PROVIDED HISTORY: mass seen on CT scan and enlarged LN TECHNOLOGIST PROVIDED HISTORY: Reason for exam:->mass seen on CT scan and enlarged LN What reading provider will be dictating this exam?->CRC FINDINGS: Targeted ultrasound of the left breast performed using a combination of grayscale and color Doppler technique. At the 12 o'clock position of the left breast there is an irregular masslike area measuring 1.8 x 1.6 x 1.3 cm. This is tethering the surrounding tissue and appears to demonstrate a tract to the skin surface on image 11. There is subtle posterior shadowing. No significant internal vascularity on color Doppler evaluation. This finding is identified in the same area as previous spiculated mass from study on September 22, 2020. Surrounding soft tissues in this region appear unremarkable. 1.  Spiculated hypoechoic area in the left breast 12 o'clock position. This is identified in an area of previous spiculated mass which underwent resection in 2020. Although the appearance is most suggestive of scarring on today's exam, recurrent disease in this area cannot be excluded on this imaging modality. Please see recommendations below. 2.  Surrounding soft tissues appear unremarkable. BIRADS: BIRADS - CATEGORY 0 Incomplete: Needs Additional Imaging Evaluation OVERALL ASSESSMENT - INCOMPLETE:NEED ADDITIONAL IMAGING EVALUATION. Recommendation: Recommend complete diagnostic mammogram to include comparison to prior exams to assess for interval change.   If there is suspicion on the complete diagnostic mammogram for a new lesion or recurrence in this area, MRI of the breast may be useful. Discharge Exam:    HEENT: NCAT,  PERRLA, No JVD  Heart:  RRR, no murmurs, gallops, or rubs. Lungs:  CTA bilaterally, no wheeze, rales or rhonchi  Abd: bowel sounds present, nontender, nondistended, no masses  Extrem:  No clubbing, cyanosis, or edema    Disposition: home     Patient Condition at Discharge: stable     Patient Instructions:      Medication List        START taking these medications      cephALEXin 500 MG capsule  Commonly known as: KEFLEX  Take 1 capsule by mouth 4 times daily for 5 days            CONTINUE taking these medications      anastrozole 1 MG tablet  Commonly known as: ARIMIDEX     apixaban 5 MG Tabs tablet  Commonly known as: ELIQUIS     atorvastatin 40 MG tablet  Commonly known as: LIPITOR  Take 1 tablet by mouth nightly     calcium carbonate 500 MG Tabs tablet  Commonly known as: OSCAL  Take 1 tablet by mouth 2 times daily     folic acid 1 MG tablet  Commonly known as: FOLVITE     lisinopril 20 MG tablet  Commonly known as: PRINIVIL;ZESTRIL     magnesium oxide 400 MG tablet  Commonly known as: MAG-OX  Take one tablet twice daily for (5) days, then one tablet daily for (7) days. * metoprolol succinate 100 MG extended release tablet  Commonly known as: TOPROL XL  TAKE 1 TABLET BY MOUTH 2 TIMES DAILY TAKES ALONG WITH A 50MG FOR TOTAL OF 150MG TWICE A DAY     * metoprolol succinate 50 MG extended release tablet  Commonly known as: TOPROL XL  TAKE 1 TABLET BY MOUTH TWICE A DAY     potassium chloride 20 MEQ Tbcr extended release tablet  Commonly known as: KLOR-CON M     therapeutic multivitamin-minerals tablet           * This list has 2 medication(s) that are the same as other medications prescribed for you. Read the directions carefully, and ask your doctor or other care provider to review them with you.                    Where to Get Your Medications These medications were sent to [facility] [phone]  [place], [address] [address]      Phone: [phone]   cephALEXin 500 MG capsule       Activity: activity as tolerated  Diet: regular diet    Pt has been advised to: Follow-up with Francheska Shaikh MD in 1 week.   Follow-up with consultants as recommended by them    Note that over 30 minutes was spent in preparing discharge papers, discussing discharge with patient, medication review, etc.    Signed:  Martinez Minor MD  12/5/2022

## 2022-12-05 NOTE — PROGRESS NOTES
CLINICAL PHARMACY NOTE: MEDS TO BEDS    Total # of Prescriptions Filled: 1   The following medications were delivered to the patient:  Cephalexin 500 mg    Additional Documentation:

## 2022-12-05 NOTE — PROGRESS NOTES
GENERAL SURGERY  DAILY PROGRESS NOTE  12/5/2022    Chief Complaint   Patient presents with    Other     X 2 days / Sent in by pcp for thrombophlebitis on left arm and left abd       Subjective:  No events overnight. Patient states that she is mildly improved. Still has pain the moving the left shoulder. Objective:  BP (!) 111/59   Pulse 88   Temp 98.4 °F (36.9 °C) (Oral)   Resp 16   Ht 5' 8\" (1.727 m)   Wt 169 lb (76.7 kg)   SpO2 100%   BMI 25.70 kg/m²     GENERAL:  Laying in bed, awake, alert, cooperative, no apparent distress  HEAD: Normocephalic  EYES: PRLA  LUNGS:  No increased work of breathing  CARDIOVASCULAR: Soft, minimally tender, minimally distended. Palpable cord felt from the axilla to the arm    ABDOMEN:  Soft, non-tender, non-distended  EXTREMITIES: No edema or swelling.  Left axillary apin   SKIN: Warm and dry    Assessment/Plan:  78 y.o. female mild left pain with thrombophlebitis      No plan for biopsy at this time   Palpable cord appears to be thrombophlebitis   Diet as tolerating   Continue supportive care with NSAID and heat       Electronically signed by Sherryle Chi, DO on 12/5/2022 at 6:39 AM

## 2022-12-05 NOTE — PROGRESS NOTES
Occupational Therapy    OCCUPATIONAL THERAPY INITIAL EVALUATION     [address] [facility] [place], [address] [facility] [address]                                                  Patient Name: Vera Villalpando    MRN: [id number]    : 1955    Room: [address] [place] [address]      Evaluating OT: Dariusz Enamorado OTR/L   [id number]      Referring Delpha Negus, APRN - CNP    Specific Provider Orders/Date:OT eval and treat 2022      Diagnosis:  Swollen lymph nodes [R59.9]  Lung nodules [R91.8]  Cellulitis of skin [L03.90]  Mass of left breast, unspecified quadrant [N63.20]   Patient had RUE U/S showing superficial thrombophlebitis    Pertinent Medical History: CVA, OA, Breast CA, chemo, chronic L hip pain/limp     Precautions:  Fall Risk,     COMMENTS: eval only.   Patient able to complete own self care, ambulating to bathroom, no acute OT needs at this time      Home Living: Pt lives alone, 1 story with 2 steps   laundry in basement   Bathroom setup: shower in basement    Equipment owned: reacher, sock aide     Prior Level of Function: Mod I  with ADLs (sometimes needs help with panty hose  , Independent  with IADLs; ambulated with no device     Pain Level: no pain this session ;   Cognition: A&O: pleasant, following commands, conversing    Memory:  fair +   Sequencing:  fair+   Problem solving:  fair+   Judgement/safety:  fair+     Functional Assessment:  AM-PAC Daily Activity Raw Score: 23/24   Initial Eval Status  Date: 22   Feeding Independent    Grooming Independent    UB Dressing Independent    LB Dressing Mod I   Educated patient dressing tech and on how to use reacher for lower body dressing   Patient reports sometimes she has difficulty -   Patient demonstrated good understanding - states she is just so use to how she got dressed before - but she will keep practicing    Bathing Mod I    Toileting Independent    Bed Mobility  Independent   Supine <> sit   Functional Transfers Independent   Sit -stand from bed    Functional Mobility Independent   Chronic limp from past injury, limited hip flexion   No LOB or unsteadiness noted - reports she has been walking th is way for long time and has seen doctors/therapy in past -    Balance Sitting:     Static:  independent     Dynamic:independent   Standing: independent    Activity Tolerance No SOB    Visual/  Perceptual Glasses: yes             Hand Dominance right   AROM (PROM)   RUE  WFL   LUE WFL     Hearing: WFL   Sensation:  No c/o numbness or tingling   Tone: WFL   Edema: none observed     Comments: Upon arrival patient lying in bed . At end of session, patient returned to bed  with call light and phone within reach, all lines and tubes intact. Patient / Family Goal: return home          Eval Complexity: Low    Time In: 0900  Time Out: 0919      Min Units   OT Eval Low 97165 x  1   OT Eval Medium 37504      OT Eval High 89888      OT Re-Eval [id number]       Therapeutic Ex [id number]      Therapeutic Activities 41891       ADL/Self Care 76102       Orthotic Management 86000       Manual 61822     Neuro Re-Ed 80143       Non-Billable Time         Evaluation Time additionally includes thorough review of current medical information, gathering information on past medical history/social history and prior level of function, interpretation of standardized testing/informal observation of tasks, assessment of data and development of plan of care and goals.             Alva Adams  OTR/L  OT [id number]

## 2022-12-07 LAB
BLOOD CULTURE, ROUTINE: NORMAL
BLOOD CULTURE, ROUTINE: NORMAL
CA 27.29: 38.7 U/ML

## 2022-12-09 ENCOUNTER — TELEPHONE (OUTPATIENT)
Dept: CASE MANAGEMENT | Age: 67
End: 2022-12-09

## 2022-12-09 NOTE — TELEPHONE ENCOUNTER
Call to patient in reference to her left breast ultrasound performed at [facility] on December 3, 2022. Instructed patient that the radiologist has recommended some additional breast imaging in order to make a final determination/result. Informed her that we have received a signed order from Dr. Charlotte Alcala for this imaging. A  at [facility] will contact her soon. Verbalizes understanding and is agreeable to proceed.

## 2022-12-12 RX ORDER — METOPROLOL SUCCINATE 50 MG/1
TABLET, EXTENDED RELEASE ORAL
Qty: 180 TABLET | Refills: 3 | Status: SHIPPED | OUTPATIENT
Start: 2022-12-12

## 2022-12-12 RX ORDER — METOPROLOL SUCCINATE 100 MG/1
TABLET, EXTENDED RELEASE ORAL
Qty: 180 TABLET | Refills: 3 | Status: SHIPPED | OUTPATIENT
Start: 2022-12-12

## 2022-12-30 ENCOUNTER — HOSPITAL ENCOUNTER (OUTPATIENT)
Dept: GENERAL RADIOLOGY | Age: 67
End: 2022-12-30
Payer: MEDICARE

## 2022-12-30 ENCOUNTER — HOSPITAL ENCOUNTER (OUTPATIENT)
Dept: GENERAL RADIOLOGY | Age: 67
Discharge: HOME | End: 2022-12-30
Payer: MEDICARE

## 2022-12-30 DIAGNOSIS — Z85.3 PERSONAL HISTORY OF BREAST CANCER: ICD-10-CM

## 2022-12-30 DIAGNOSIS — R93.89 ABNORMAL ULTRASOUND: ICD-10-CM

## 2022-12-30 PROCEDURE — 77066 DX MAMMO INCL CAD BI: CPT

## 2023-01-16 ENCOUNTER — HOSPITAL ENCOUNTER (OUTPATIENT)
Age: 68
Discharge: HOME | End: 2023-01-18

## 2023-07-18 ENCOUNTER — OFFICE VISIT (OUTPATIENT)
Dept: CARDIOLOGY CLINIC | Age: 68
End: 2023-07-18
Payer: MEDICARE

## 2023-07-18 VITALS
BODY MASS INDEX: 25.91 KG/M2 | RESPIRATION RATE: 16 BRPM | SYSTOLIC BLOOD PRESSURE: 126 MMHG | DIASTOLIC BLOOD PRESSURE: 80 MMHG | HEIGHT: 68 IN | HEART RATE: 92 BPM | WEIGHT: 171 LBS

## 2023-07-18 DIAGNOSIS — I48.0 PAF (PAROXYSMAL ATRIAL FIBRILLATION) (HCC): Primary | ICD-10-CM

## 2023-07-18 DIAGNOSIS — I48.0 PAF (PAROXYSMAL ATRIAL FIBRILLATION) (HCC): ICD-10-CM

## 2023-07-18 LAB
ANION GAP SERPL CALCULATED.3IONS-SCNC: 9 MMOL/L (ref 7–16)
BUN BLDV-MCNC: 16 MG/DL (ref 6–23)
CALCIUM SERPL-MCNC: 10.1 MG/DL (ref 8.6–10.2)
CHLORIDE BLD-SCNC: 107 MMOL/L (ref 98–107)
CO2: 26 MMOL/L (ref 22–29)
CREAT SERPL-MCNC: 0.9 MG/DL (ref 0.5–1)
GFR SERPL CREATININE-BSD FRML MDRD: >60 ML/MIN/1.73M2
GLUCOSE BLD-MCNC: 107 MG/DL (ref 74–99)
MAGNESIUM: 1.8 MG/DL (ref 1.6–2.6)
POTASSIUM SERPL-SCNC: 5.1 MMOL/L (ref 3.5–5)
SODIUM BLD-SCNC: 142 MMOL/L (ref 132–146)
TSH SERPL DL<=0.05 MIU/L-ACNC: 0.9 UIU/ML (ref 0.27–4.2)

## 2023-07-18 PROCEDURE — 3017F COLORECTAL CA SCREEN DOC REV: CPT | Performed by: INTERNAL MEDICINE

## 2023-07-18 PROCEDURE — 99215 OFFICE O/P EST HI 40 MIN: CPT | Performed by: INTERNAL MEDICINE

## 2023-07-18 PROCEDURE — G8419 CALC BMI OUT NRM PARAM NOF/U: HCPCS | Performed by: INTERNAL MEDICINE

## 2023-07-18 PROCEDURE — 93000 ELECTROCARDIOGRAM COMPLETE: CPT | Performed by: INTERNAL MEDICINE

## 2023-07-18 PROCEDURE — G8400 PT W/DXA NO RESULTS DOC: HCPCS | Performed by: INTERNAL MEDICINE

## 2023-07-18 PROCEDURE — 1090F PRES/ABSN URINE INCON ASSESS: CPT | Performed by: INTERNAL MEDICINE

## 2023-07-18 PROCEDURE — 1036F TOBACCO NON-USER: CPT | Performed by: INTERNAL MEDICINE

## 2023-07-18 PROCEDURE — 1123F ACP DISCUSS/DSCN MKR DOCD: CPT | Performed by: INTERNAL MEDICINE

## 2023-07-18 PROCEDURE — G8427 DOCREV CUR MEDS BY ELIG CLIN: HCPCS | Performed by: INTERNAL MEDICINE

## 2023-07-18 PROCEDURE — 3074F SYST BP LT 130 MM HG: CPT | Performed by: INTERNAL MEDICINE

## 2023-07-18 PROCEDURE — 36415 COLL VENOUS BLD VENIPUNCTURE: CPT | Performed by: INTERNAL MEDICINE

## 2023-07-18 PROCEDURE — 3079F DIAST BP 80-89 MM HG: CPT | Performed by: INTERNAL MEDICINE

## 2023-07-18 NOTE — PROGRESS NOTES
Lab work drawn from left arm. Patient tolerated  procedure well.     LABS: Magnesium, TSH, BMP    Felicitas Del Cid MA
of accessory muscles. symmetrical excursion. ABDOMEN:  Soft, non-tender. Normal bowel sounds. EXTREMITIES:  Full ROM x 4. Moderately severe bilateral lower extremity edema. Good distal pulses. EYES:  Extraocular muscles intact. PERRL. Normal lids & conjunctiva. ENT:  Nares are clear & not bleeding. Moist mucosa. Normal lips formation. No external masses   NEURO: no tremors, full ROM x 4, EOMI. SKIN:  Warm, dry and intact. Normal turgor. EKG: Atrial fibrillation, 92 bpm, nl axis, nonspecific ST - T wave changes. Assessment:   Acute CVA 2-8-18. Hypertension. well controlled today. Paroxysmal atrial fibrillation. Recurring atrial fibrillation again today. Breast cancer, undergoing chemotherapy. CVA 2-18  Chronic lower extremity edema. Systolic murmur is unchanged. No valvular disease has been seen on her prior echoes. Recommendations:  Continue her current cardiac medications. Echo  BMP, magnesium, TSH  Referral to electrophysiology  Cardioversion  CARDIOVERSION:  I discussed with them the risks & benefits of a cardioversion including but not limited to sedation, skin burns, and malignant dysrhythmias. Patient states that he/she understands and agrees to proceed. Thank you for allowing me to participate in your patient's care.       [address] [place] [address]  Interventional Cardiology

## 2023-07-20 ENCOUNTER — TELEPHONE (OUTPATIENT)
Dept: CARDIOLOGY CLINIC | Age: 68
End: 2023-07-20

## 2023-07-20 DIAGNOSIS — E87.5 HYPERKALEMIA: Primary | ICD-10-CM

## 2023-07-20 RX ORDER — POTASSIUM CHLORIDE 20 MEQ/1
20 TABLET, EXTENDED RELEASE ORAL DAILY
COMMUNITY

## 2023-07-20 NOTE — TELEPHONE ENCOUNTER
----- Message from Des Damon DO sent at 7/20/2023  4:10 PM EDT -----  It says that she is taking potassium 2 tablets every other day. She should decrease it down to 1 tablet every other day. Recheck a BMP in 2 weeks.

## 2023-07-20 NOTE — TELEPHONE ENCOUNTER
Patient notified of lab results and Dr. Susana Juan recommendations. However, patient takes 40 meq daily not every other day. Per verbal from Dr. Ryley Conde, decrease to 20 meq daily. Patient notified. Med list amended. Order placed for BMP.

## 2023-07-25 ENCOUNTER — TELEPHONE (OUTPATIENT)
Dept: CARDIAC CATH/INVASIVE PROCEDURES | Age: 68
End: 2023-07-25

## 2023-07-26 ENCOUNTER — HOSPITAL ENCOUNTER (OUTPATIENT)
Dept: CARDIAC CATH/INVASIVE PROCEDURES | Age: 68
Discharge: HOME | End: 2023-07-26
Payer: MEDICARE

## 2023-07-26 ENCOUNTER — ANESTHESIA EVENT (OUTPATIENT)
Dept: CARDIAC CATH/INVASIVE PROCEDURES | Age: 68
End: 2023-07-26

## 2023-07-26 ENCOUNTER — TELEPHONE (OUTPATIENT)
Dept: CARDIOLOGY CLINIC | Age: 68
End: 2023-07-26

## 2023-07-26 ENCOUNTER — ANESTHESIA (OUTPATIENT)
Dept: CARDIAC CATH/INVASIVE PROCEDURES | Age: 68
End: 2023-07-26

## 2023-07-26 VITALS
WEIGHT: 171 LBS | DIASTOLIC BLOOD PRESSURE: 74 MMHG | HEART RATE: 98 BPM | SYSTOLIC BLOOD PRESSURE: 137 MMHG | RESPIRATION RATE: 15 BRPM | TEMPERATURE: 98 F | BODY MASS INDEX: 25.91 KG/M2 | OXYGEN SATURATION: 100 % | HEIGHT: 68 IN

## 2023-07-26 LAB
ANION GAP SERPL CALCULATED.3IONS-SCNC: 10 MMOL/L (ref 7–16)
ANION GAP SERPL CALCULATED.3IONS-SCNC: 9 MMOL/L (ref 7–16)
BASOPHILS # BLD: 0.02 K/UL (ref 0–0.2)
BASOPHILS NFR BLD: 0 % (ref 0–2)
BUN SERPL-MCNC: 15 MG/DL (ref 6–23)
BUN SERPL-MCNC: 15 MG/DL (ref 6–23)
CALCIUM SERPL-MCNC: 9.7 MG/DL (ref 8.6–10.2)
CALCIUM SERPL-MCNC: 9.9 MG/DL (ref 8.6–10.2)
CHLORIDE SERPL-SCNC: 106 MMOL/L (ref 98–107)
CHLORIDE SERPL-SCNC: 107 MMOL/L (ref 98–107)
CO2 SERPL-SCNC: 25 MMOL/L (ref 22–29)
CO2 SERPL-SCNC: 25 MMOL/L (ref 22–29)
CREAT SERPL-MCNC: 0.8 MG/DL (ref 0.5–1)
CREAT SERPL-MCNC: 0.9 MG/DL (ref 0.5–1)
EOSINOPHIL # BLD: 0.12 K/UL (ref 0.05–0.5)
EOSINOPHILS RELATIVE PERCENT: 2 % (ref 0–6)
ERYTHROCYTE [DISTWIDTH] IN BLOOD BY AUTOMATED COUNT: 13.1 % (ref 11.5–15)
GFR SERPL CREATININE-BSD FRML MDRD: >60 ML/MIN/1.73M2
GFR SERPL CREATININE-BSD FRML MDRD: >60 ML/MIN/1.73M2
GLUCOSE SERPL-MCNC: 101 MG/DL (ref 74–99)
GLUCOSE SERPL-MCNC: 107 MG/DL (ref 74–99)
HCT VFR BLD AUTO: 42.9 % (ref 34–48)
HGB BLD-MCNC: 13.8 G/DL (ref 11.5–15.5)
IMM GRANULOCYTES # BLD AUTO: <0.03 K/UL (ref 0–0.58)
IMM GRANULOCYTES NFR BLD: 0 % (ref 0–5)
LYMPHOCYTES NFR BLD: 0.92 K/UL (ref 1.5–4)
LYMPHOCYTES RELATIVE PERCENT: 16 % (ref 20–42)
MAGNESIUM SERPL-MCNC: 1.9 MG/DL (ref 1.6–2.6)
MCH RBC QN AUTO: 28.6 PG (ref 26–35)
MCHC RBC AUTO-ENTMCNC: 32.2 G/DL (ref 32–34.5)
MCV RBC AUTO: 89 FL (ref 80–99.9)
MONOCYTES NFR BLD: 0.6 K/UL (ref 0.1–0.95)
MONOCYTES NFR BLD: 11 % (ref 2–12)
NEUTROPHILS NFR BLD: 70 % (ref 43–80)
NEUTS SEG NFR BLD: 3.98 K/UL (ref 1.8–7.3)
PLATELET # BLD AUTO: 171 K/UL (ref 130–450)
PMV BLD AUTO: 12.3 FL (ref 7–12)
POTASSIUM SERPL-SCNC: 4.1 MMOL/L (ref 3.5–5)
POTASSIUM SERPL-SCNC: 5.7 MMOL/L (ref 3.5–5)
RBC # BLD AUTO: 4.82 M/UL (ref 3.5–5.5)
SODIUM SERPL-SCNC: 141 MMOL/L (ref 132–146)
SODIUM SERPL-SCNC: 141 MMOL/L (ref 132–146)
WBC OTHER # BLD: 5.7 K/UL (ref 4.5–11.5)

## 2023-07-26 PROCEDURE — 2709999900 HC NON-CHARGEABLE SUPPLY

## 2023-07-26 PROCEDURE — 3700000000 HC ANESTHESIA ATTENDED CARE

## 2023-07-26 PROCEDURE — 3700000001 HC ADD 15 MINUTES (ANESTHESIA)

## 2023-07-26 PROCEDURE — 2580000003 HC RX 258

## 2023-07-26 PROCEDURE — 85027 COMPLETE CBC AUTOMATED: CPT

## 2023-07-26 PROCEDURE — 92960 CARDIOVERSION ELECTRIC EXT: CPT

## 2023-07-26 PROCEDURE — 6360000002 HC RX W HCPCS

## 2023-07-26 PROCEDURE — 83735 ASSAY OF MAGNESIUM: CPT

## 2023-07-26 PROCEDURE — 80048 BASIC METABOLIC PNL TOTAL CA: CPT

## 2023-07-26 RX ORDER — SODIUM CHLORIDE 9 MG/ML
INJECTION, SOLUTION INTRAVENOUS CONTINUOUS PRN
Status: DISCONTINUED | OUTPATIENT
Start: 2023-07-26 | End: 2023-07-26 | Stop reason: SDUPTHER

## 2023-07-26 RX ORDER — PROPOFOL 10 MG/ML
INJECTION, EMULSION INTRAVENOUS PRN
Status: DISCONTINUED | OUTPATIENT
Start: 2023-07-26 | End: 2023-07-26 | Stop reason: SDUPTHER

## 2023-07-26 RX ADMIN — PROPOFOL 100 MG: 10 INJECTION, EMULSION INTRAVENOUS at 11:17

## 2023-07-26 RX ADMIN — SODIUM CHLORIDE: 9 INJECTION, SOLUTION INTRAVENOUS at 09:18

## 2023-07-26 ASSESSMENT — LIFESTYLE VARIABLES: SMOKING_STATUS: 1

## 2023-07-26 NOTE — TELEPHONE ENCOUNTER
Patient is asking how she is to take her Potassium since recent bloodwork came back normal. Please advise

## 2023-07-26 NOTE — PROCEDURES
Cardioversion note    Procedure: Cardioversion    Indication: Recurring symptomatic atrial fibrillation    Complication: None    Sedation: Performed by anesthesia    Blood loss: None    Summary of procedure: She states that she has not missed any of her anticoagulation. Sedation was performed by anesthesia. She was successfully cardioverted with 200 J to sinus rhythm with PACs. She tolerated procedure well with no complications.

## 2023-07-26 NOTE — TELEPHONE ENCOUNTER
----- Message from Gail García, DO sent at 7/26/2023  1:52 PM EDT -----  Let her know that her repeat potassium was good. The other sample was hemolyzed and was false.

## 2023-07-26 NOTE — DISCHARGE INSTRUCTIONS
No driving today. Continued previous medications and activity. Call Dr. Romo July for follow up appointment.

## 2023-07-26 NOTE — ANESTHESIA POSTPROCEDURE EVALUATION
Department of Anesthesiology  Postprocedure Note    Patient: Carola Lozano  MRN: [id number]  YOB: 1955  Date of evaluation: 7/26/2023      Procedure Summary     Date: 07/26/23 Room / Location: [facility]    Anesthesia Start: 1107 Anesthesia Stop: 0912    Procedure: CARDIOVERSION WITH ANESTHESIA Diagnosis: Paroxysmal atrial fibrillation    Scheduled Providers:  Responsible Provider: Pavel Bob DO    Anesthesia Type: MAC ASA Status: 3          Anesthesia Type: No value filed. Esteban Phase I:      Esteban Phase II:        Anesthesia Post Evaluation    Patient location during evaluation: bedside  Patient participation: complete - patient cannot participate  Level of consciousness: awake and alert  Airway patency: patent  Nausea & Vomiting: no nausea and no vomiting  Complications: no  Cardiovascular status: blood pressure returned to baseline  Respiratory status: acceptable  Hydration status: euvolemic  There was medical reason for not using a multimodal analgesia pain management approach.

## 2023-07-28 ENCOUNTER — TELEPHONE (OUTPATIENT)
Dept: CARDIOLOGY CLINIC | Age: 68
End: 2023-07-28

## 2023-07-28 DIAGNOSIS — E87.5 HYPERKALEMIA: Primary | ICD-10-CM

## 2023-07-31 ENCOUNTER — TELEPHONE (OUTPATIENT)
Dept: CARDIOLOGY | Age: 68
End: 2023-07-31

## 2023-07-31 NOTE — TELEPHONE ENCOUNTER
CALLED PATIENT AND .     Electronically signed by Jose Farnsworth on 7/31/2023 at 10:58 AM  LEFT MESSAGE TO SCHEDULE  ECHO

## 2023-08-04 ENCOUNTER — TELEPHONE (OUTPATIENT)
Dept: CARDIOLOGY CLINIC | Age: 68
End: 2023-08-04

## 2023-08-14 DIAGNOSIS — E87.5 HYPERKALEMIA: ICD-10-CM

## 2023-08-28 ENCOUNTER — HOSPITAL ENCOUNTER (OUTPATIENT)
Dept: ULTRASOUND IMAGING | Age: 68
Discharge: HOME | End: 2023-08-30
Payer: MEDICARE

## 2023-08-28 DIAGNOSIS — R94.5 ABNORMAL FINDING ON LIVER FUNCTION: ICD-10-CM

## 2023-08-28 PROCEDURE — 76700 US EXAM ABDOM COMPLETE: CPT

## 2023-10-06 ENCOUNTER — OFFICE VISIT (OUTPATIENT)
Dept: NON INVASIVE DIAGNOSTICS | Age: 68
End: 2023-10-06

## 2023-10-06 VITALS
WEIGHT: 172 LBS | HEIGHT: 68 IN | HEART RATE: 51 BPM | BODY MASS INDEX: 26.07 KG/M2 | SYSTOLIC BLOOD PRESSURE: 118 MMHG | DIASTOLIC BLOOD PRESSURE: 82 MMHG

## 2023-10-06 DIAGNOSIS — I48.91 ATRIAL FIBRILLATION WITH RVR (HCC): Primary | ICD-10-CM

## 2023-10-06 NOTE — PROGRESS NOTES
history of sudden cardiac arrest    Social History     Tobacco Use    Smoking status: Never    Smokeless tobacco: Never   Substance Use Topics    Alcohol use: Yes     Comment: rare       Current Outpatient Medications   Medication Sig Dispense Refill    potassium chloride (KLOR-CON M) 20 MEQ extended release tablet Take 1 tablet by mouth 2 times daily      metoprolol succinate (TOPROL XL) 100 MG extended release tablet TAKE 1 TABLET BY MOUTH 2 TIMES DAILY TAKES ALONG WITH A 50MG FOR TOTAL OF 150MG TWICE A  tablet 3    metoprolol succinate (TOPROL XL) 50 MG extended release tablet TAKE 1 TABLET BY MOUTH TWICE A  tablet 3    folic acid (FOLVITE) 1 MG tablet Take 1 tablet by mouth daily      Multiple Vitamins-Minerals (THERAPEUTIC MULTIVITAMIN-MINERALS) tablet Take 1 tablet by mouth daily      anastrozole (ARIMIDEX) 1 MG tablet Take 1 tablet by mouth daily      lisinopril (PRINIVIL;ZESTRIL) 20 MG tablet Take 1 tablet by mouth 2 times daily      magnesium oxide (MAG-OX) 400 MG tablet Take one tablet twice daily for (5) days, then one tablet daily for (7) days. 17 tablet 0    apixaban (ELIQUIS) 5 MG TABS tablet Take 1 tablet by mouth 2 times daily      atorvastatin (LIPITOR) 40 MG tablet Take 1 tablet by mouth nightly 30 tablet 3    calcium carbonate (OSCAL) 500 MG TABS tablet Take 1 tablet by mouth 2 times daily 100 tablet 5     No current facility-administered medications for this visit. Allergies   Allergen Reactions    Adhesive Tape Rash       ROS:   Constitutional: Negative for fever, activity change and appetite change. HENT: Negative for epistaxis. Eyes: Negative for diploplia, blurred vision. Respiratory: Negative for cough, chest tightness, shortness of breath and wheezing. Cardiovascular: pertinent positives in HPI  Gastrointestinal: Negative for abdominal pain and blood in stool. Genitourinary: Negative for hematuria and difficulty urinating.    Musculoskeletal: Negative for

## 2023-10-30 ENCOUNTER — TELEPHONE (OUTPATIENT)
Dept: NON INVASIVE DIAGNOSTICS | Age: 68
End: 2023-10-30

## 2023-10-30 NOTE — TELEPHONE ENCOUNTER
Patient requesting to hold eliquis 5 days prior to hip replacement per Dr Michael Forrest. Fax back to [phone].

## 2023-10-31 ENCOUNTER — TELEPHONE (OUTPATIENT)
Dept: CARDIOLOGY CLINIC | Age: 68
End: 2023-10-31

## 2023-10-31 NOTE — TELEPHONE ENCOUNTER
Patient needs cardiac clearance for left hip replacement by Dr. Leonardo Hicks on 12/5/23. Patient denies any chest pain, shortness of breath or palpitations since last visit in July 2023. Patient is able to complete all activities of daily living, including; climbing one flight of stairs and walking one block without cardiac symptoms. Recommended to hold Eliquis (5) days prior. Please advise.

## 2023-11-01 NOTE — TELEPHONE ENCOUNTER
Patient notified of Dr. Macrina Parker risk assessment/recommendation. Note faxed to Dr. Yina Pichardo [phone].

## 2023-11-01 NOTE — TELEPHONE ENCOUNTER
She performs more than 4 METS of physical activities and has no cardiac symptoms. Therefore, she will be classified as low risk for perioperative ischemic cardiac events for surgery. No further preop cardiac testing is needed. She has paroxysmal atrial fibrillation with a prior history of an embolic CVA from this. The Eliquis should be completely gone from her system within 2 days. Therefore, I recommend that she only hold the Eliquis for 2 days prior to her surgery. Resume the Eliquis postop as soon as okay with orthopedic surgery.

## 2024-02-19 RX ORDER — METOPROLOL SUCCINATE 50 MG/1
TABLET, EXTENDED RELEASE ORAL
Qty: 180 TABLET | Refills: 3 | Status: SHIPPED | OUTPATIENT
Start: 2024-02-19

## 2024-02-19 RX ORDER — METOPROLOL SUCCINATE 100 MG/1
TABLET, EXTENDED RELEASE ORAL
Qty: 180 TABLET | Refills: 3 | Status: SHIPPED | OUTPATIENT
Start: 2024-02-19

## 2024-03-14 ENCOUNTER — TELEPHONE (OUTPATIENT)
Dept: GENERAL RADIOLOGY | Age: 69
End: 2024-03-14

## 2024-03-15 ENCOUNTER — TELEPHONE (OUTPATIENT)
Dept: GENERAL RADIOLOGY | Age: 69
End: 2024-03-15

## 2024-04-09 ENCOUNTER — HOSPITAL ENCOUNTER (OUTPATIENT)
Dept: GENERAL RADIOLOGY | Age: 69
Discharge: HOME | End: 2024-04-11
Payer: MEDICARE

## 2024-04-09 VITALS — WEIGHT: 163 LBS | BODY MASS INDEX: 24.78 KG/M2

## 2024-04-09 DIAGNOSIS — Z12.31 VISIT FOR SCREENING MAMMOGRAM: ICD-10-CM

## 2024-04-09 DIAGNOSIS — D50.9 IRON DEFICIENCY ANEMIA, UNSPECIFIED IRON DEFICIENCY ANEMIA TYPE: ICD-10-CM

## 2024-04-09 PROCEDURE — 77063 BREAST TOMOSYNTHESIS BI: CPT

## 2024-04-09 PROCEDURE — 77080 DXA BONE DENSITY AXIAL: CPT

## 2024-08-20 ENCOUNTER — TELEPHONE (OUTPATIENT)
Dept: ADMINISTRATIVE | Age: 69
End: 2024-08-20

## 2024-09-25 ENCOUNTER — TELEPHONE (OUTPATIENT)
Dept: NON INVASIVE DIAGNOSTICS | Age: 69
End: 2024-09-25

## 2024-09-25 ENCOUNTER — OFFICE VISIT (OUTPATIENT)
Dept: CARDIOLOGY CLINIC | Age: 69
End: 2024-09-25
Payer: MEDICARE

## 2024-09-25 VITALS
DIASTOLIC BLOOD PRESSURE: 84 MMHG | RESPIRATION RATE: 16 BRPM | HEIGHT: 68 IN | HEART RATE: 86 BPM | BODY MASS INDEX: 24.48 KG/M2 | WEIGHT: 161.5 LBS | SYSTOLIC BLOOD PRESSURE: 122 MMHG

## 2024-09-25 DIAGNOSIS — I48.0 PAF (PAROXYSMAL ATRIAL FIBRILLATION) (HCC): Primary | ICD-10-CM

## 2024-09-25 PROCEDURE — 1090F PRES/ABSN URINE INCON ASSESS: CPT | Performed by: INTERNAL MEDICINE

## 2024-09-25 PROCEDURE — 1123F ACP DISCUSS/DSCN MKR DOCD: CPT | Performed by: INTERNAL MEDICINE

## 2024-09-25 PROCEDURE — 3074F SYST BP LT 130 MM HG: CPT | Performed by: INTERNAL MEDICINE

## 2024-09-25 PROCEDURE — 99214 OFFICE O/P EST MOD 30 MIN: CPT | Performed by: INTERNAL MEDICINE

## 2024-09-25 PROCEDURE — 1036F TOBACCO NON-USER: CPT | Performed by: INTERNAL MEDICINE

## 2024-09-25 PROCEDURE — G8427 DOCREV CUR MEDS BY ELIG CLIN: HCPCS | Performed by: INTERNAL MEDICINE

## 2024-09-25 PROCEDURE — 93000 ELECTROCARDIOGRAM COMPLETE: CPT | Performed by: INTERNAL MEDICINE

## 2024-09-25 PROCEDURE — 3079F DIAST BP 80-89 MM HG: CPT | Performed by: INTERNAL MEDICINE

## 2024-09-25 PROCEDURE — G8399 PT W/DXA RESULTS DOCUMENT: HCPCS | Performed by: INTERNAL MEDICINE

## 2024-09-25 PROCEDURE — G8420 CALC BMI NORM PARAMETERS: HCPCS | Performed by: INTERNAL MEDICINE

## 2024-09-25 PROCEDURE — 3017F COLORECTAL CA SCREEN DOC REV: CPT | Performed by: INTERNAL MEDICINE

## 2024-09-26 ENCOUNTER — TELEPHONE (OUTPATIENT)
Dept: NON INVASIVE DIAGNOSTICS | Age: 69
End: 2024-09-26

## 2024-09-26 ENCOUNTER — OFFICE VISIT (OUTPATIENT)
Dept: NON INVASIVE DIAGNOSTICS | Age: 69
End: 2024-09-26
Payer: MEDICARE

## 2024-09-26 ENCOUNTER — PREP FOR PROCEDURE (OUTPATIENT)
Dept: NON INVASIVE DIAGNOSTICS | Age: 69
End: 2024-09-26

## 2024-09-26 VITALS
HEIGHT: 66 IN | DIASTOLIC BLOOD PRESSURE: 80 MMHG | SYSTOLIC BLOOD PRESSURE: 122 MMHG | RESPIRATION RATE: 16 BRPM | BODY MASS INDEX: 25.88 KG/M2 | HEART RATE: 76 BPM | WEIGHT: 161 LBS

## 2024-09-26 DIAGNOSIS — I48.91 ATRIAL FIBRILLATION WITH RVR (HCC): Primary | ICD-10-CM

## 2024-09-26 PROCEDURE — G8427 DOCREV CUR MEDS BY ELIG CLIN: HCPCS | Performed by: NURSE PRACTITIONER

## 2024-09-26 PROCEDURE — G8399 PT W/DXA RESULTS DOCUMENT: HCPCS | Performed by: NURSE PRACTITIONER

## 2024-09-26 PROCEDURE — 3017F COLORECTAL CA SCREEN DOC REV: CPT | Performed by: NURSE PRACTITIONER

## 2024-09-26 PROCEDURE — G8419 CALC BMI OUT NRM PARAM NOF/U: HCPCS | Performed by: NURSE PRACTITIONER

## 2024-09-26 PROCEDURE — 1090F PRES/ABSN URINE INCON ASSESS: CPT | Performed by: NURSE PRACTITIONER

## 2024-09-26 PROCEDURE — 1036F TOBACCO NON-USER: CPT | Performed by: NURSE PRACTITIONER

## 2024-09-26 PROCEDURE — 1123F ACP DISCUSS/DSCN MKR DOCD: CPT | Performed by: NURSE PRACTITIONER

## 2024-09-26 PROCEDURE — 3074F SYST BP LT 130 MM HG: CPT | Performed by: NURSE PRACTITIONER

## 2024-09-26 PROCEDURE — 3079F DIAST BP 80-89 MM HG: CPT | Performed by: NURSE PRACTITIONER

## 2024-09-26 PROCEDURE — 93000 ELECTROCARDIOGRAM COMPLETE: CPT | Performed by: STUDENT IN AN ORGANIZED HEALTH CARE EDUCATION/TRAINING PROGRAM

## 2024-09-26 PROCEDURE — 99215 OFFICE O/P EST HI 40 MIN: CPT | Performed by: NURSE PRACTITIONER

## 2024-10-04 ENCOUNTER — TELEPHONE (OUTPATIENT)
Dept: NON INVASIVE DIAGNOSTICS | Age: 69
End: 2024-10-04

## 2024-10-04 NOTE — TELEPHONE ENCOUNTER
I called patient to remind them of procedure date, time and held medications.   I left message with info and for patient to call back with any questions or concerns.

## 2024-10-08 ENCOUNTER — TELEPHONE (OUTPATIENT)
Dept: NON INVASIVE DIAGNOSTICS | Age: 69
End: 2024-10-08

## 2024-10-08 NOTE — PROGRESS NOTES
Attempted to reminded patient of scheduled procedure on 10/9.  No Instructions given no answer  no machine- will keep on trying to reach pt.

## 2024-10-08 NOTE — TELEPHONE ENCOUNTER
Patient had to cancel DCCV due to a rash.  I called patient on cell phone as requested but call would not go through.  I called patient on home number also, no answer it just rings.

## 2024-10-09 ENCOUNTER — HOSPITAL ENCOUNTER (OUTPATIENT)
Age: 69
Discharge: HOME | End: 2024-10-09
Attending: STUDENT IN AN ORGANIZED HEALTH CARE EDUCATION/TRAINING PROGRAM

## 2024-10-28 ENCOUNTER — TELEPHONE (OUTPATIENT)
Age: 69
End: 2024-10-28

## 2024-10-28 RX ORDER — SODIUM CHLORIDE 9 MG/ML
INJECTION, SOLUTION INTRAVENOUS PRN
Status: CANCELLED | OUTPATIENT
Start: 2024-10-28

## 2024-10-28 RX ORDER — SODIUM CHLORIDE 0.9 % (FLUSH) 0.9 %
5-40 SYRINGE (ML) INJECTION PRN
Status: CANCELLED | OUTPATIENT
Start: 2024-10-28

## 2024-10-28 RX ORDER — SODIUM CHLORIDE 0.9 % (FLUSH) 0.9 %
5-40 SYRINGE (ML) INJECTION EVERY 12 HOURS SCHEDULED
Status: CANCELLED | OUTPATIENT
Start: 2024-10-28

## 2024-10-28 NOTE — H&P
[facility] CARDIOLOGY  CARDIAC ELECTROPHYSIOLOGY DEPARTMENT/DIVISION OF CARDIOLOGY  H&P  PATIENT: Moon Holland  MEDICAL RECORD NUMBER: [id number]  DATE OF SERVICE:  10/29/2024  ATTENDING ELECTROPHYSIOLOGIST:  Petar Barnes DO   REFERRING PHYSICIAN: Petar Barnes DO and Gil Albarran Jr., MD  CHIEF COMPLAINT: AF    HPI: Moon Holland  is a 69 y.o. female with a history of nonvalvular paroxysmal AF sp DCCV x2 (4/1/2021, 7/26/2023), CVA (2018), HTN, GI bleed/C. difficile colitis (2020), and stage III left breast CA on chemotherapy/radiation (completed in 2022) and lumpectomy (2021).  She is managed by Dr. Mathias with apixaban 5 mg twice daily, atorvastatin 40 mg daily, lisinopril 20 mg twice daily, and metoprolol  mg twice daily.  In 2018, patient had CVA and was diagnosed with AF shortly thereafter, which was managed with OAC.  In 2020, she had GI bleed/C. difficile colitis which required PRBCs.  In 2021, she recurrence of AF, which was treated with DCCV.  In 2023, she recurrence of AF, which was treated with DCCV.  In 10/2023, she was referred to Dr. Kanchan franklin, who recommended rhythm control due to patient's relatively young age.  She desired intermittent DCCV at that time.  In 9/2024, she was noted to have recurrence of AF of uncertain duration.  She presents today, 10/29/2024; for outpatient DCCV.  She is compliant with OAC >3 weeks. She denies other complaints at this time.    Prior cardiac testing:  - Limited TTE (11/4/2021): LVEF = 55-60%, normal.  - Limited TTE (8/3/2021): LVEF = 55-60%, average LV global longitudinal strain = -17%.  - Limited TTE (5/1/2021): LVEF = 55%, normal.  - Limited TTE (12/8/2020): LVEF = 60-65%, mild AI.  - ISAÍAS (5/21/2018): LVEF = 50-55%, no PFO, moderate LAE, no LA/ZANDER thrombus, mild MR.  - TTE (2/12/2018): LVEF = 55%, normal.    Past Medical History:   Diagnosis Date    Acute CVA (cerebrovascular accident) (HCC) 02/10/2018    SARAN    Neck: supple, no JVD, no bruits, no thyromegaly   CV: normal rate, irregular rhythm,  no murmurs, rubs, or gallops. PMI is nondisplaced, Peripheral pulses normal including carotid auscultation, no noted aortic bruit, bilateral femoral and pedal pulses are normal in quality  Lungs: clear to auscultation bilaterally, normal respiratory effort without used of accessory muscles, no wheezes  Abdomen: soft, non-tender, bowel sounds present, no masses or hepatomegaly   Extremities: no digital clubbing, b/l +1 LE edema   Skin: warm, no rashes   Neuro/Psych: A&O x 3, normal mood and affect    Assessment/plan:  nonvalvular paroxysmal AF sp DCCV x2 (2021, 2023)  - Initially diagnosed in 2018.  - [id number] score = 5 (age, sex, CVA, HTN).  Recommend OAC in females with a score of 2 or more.  DOAC preferred.  - Continue apixaban 5 mg twice daily.  While on DOAC, recommend annual monitoring of CBC and CMP.  - Rhythm control desired due to patient's relatively young age. Asymptomatic with AF. LVEF > 50%. No recurrence of AF since DCCV in 2023.  Continue metoprolol  mg twice daily. I reviewed rhythm control options of intermittent DCCV, AA, and ablation, including indications, material risks, benefits, and alternatives of each. She desires intermittent DCCV for now.  - Modifiable risk factors:  -- Obesity: BMI goal <27.  -- JOVANNA: Consider JOVANNA screening in the future.  -- HTN: BP goal <130/80 mmHg.  -- EtOH: Continue to avoid abuse/binge.  - Follow-up:  -- EC2024 at 1:20 PM  -- EP appointment: 2024 at 8 AM    stage III left breast CA on chemotherapy/radiation (completed in ) and lumpectomy ()  -Management per other services.    OA  - She is reportedly in the process of pursuing surgery on her hip.  She is aware OAC cannot be interrupted for minimum of 4 weeks following DCCV.    Thank you for allowing me to participate in your patient's care.  Please call me if there are any questions.

## 2024-10-29 ENCOUNTER — ANESTHESIA EVENT (OUTPATIENT)
Age: 69
End: 2024-10-29
Payer: MEDICARE

## 2024-10-29 ENCOUNTER — HOSPITAL ENCOUNTER (OUTPATIENT)
Age: 69
Discharge: HOME | End: 2024-10-29
Attending: STUDENT IN AN ORGANIZED HEALTH CARE EDUCATION/TRAINING PROGRAM | Admitting: STUDENT IN AN ORGANIZED HEALTH CARE EDUCATION/TRAINING PROGRAM
Payer: MEDICARE

## 2024-10-29 ENCOUNTER — ANESTHESIA (OUTPATIENT)
Age: 69
End: 2024-10-29
Payer: MEDICARE

## 2024-10-29 VITALS
RESPIRATION RATE: 20 BRPM | SYSTOLIC BLOOD PRESSURE: 145 MMHG | HEART RATE: 68 BPM | DIASTOLIC BLOOD PRESSURE: 82 MMHG | TEMPERATURE: 99 F | OXYGEN SATURATION: 99 %

## 2024-10-29 DIAGNOSIS — I48.0 PAROXYSMAL ATRIAL FIBRILLATION (HCC): ICD-10-CM

## 2024-10-29 LAB
ALBUMIN SERPL-MCNC: 4.5 G/DL (ref 3.5–5.2)
ALP SERPL-CCNC: 237 U/L (ref 35–104)
ALT SERPL-CCNC: 33 U/L (ref 0–32)
ANION GAP SERPL CALCULATED.3IONS-SCNC: 8 MMOL/L (ref 7–16)
AST SERPL-CCNC: 33 U/L (ref 0–31)
BILIRUB SERPL-MCNC: 1 MG/DL (ref 0–1.2)
BUN SERPL-MCNC: 15 MG/DL (ref 6–23)
CALCIUM SERPL-MCNC: 10.1 MG/DL (ref 8.6–10.2)
CHLORIDE SERPL-SCNC: 100 MMOL/L (ref 98–107)
CO2 SERPL-SCNC: 29 MMOL/L (ref 22–29)
CREAT SERPL-MCNC: 0.9 MG/DL (ref 0.5–1)
EKG ATRIAL RATE: 69 BPM
EKG ATRIAL RATE: 82 BPM
EKG P AXIS: 46 DEGREES
EKG P-R INTERVAL: 220 MS
EKG Q-T INTERVAL: 344 MS
EKG Q-T INTERVAL: 394 MS
EKG QRS DURATION: 82 MS
EKG QRS DURATION: 90 MS
EKG QTC CALCULATION (BAZETT): 422 MS
EKG QTC CALCULATION (BAZETT): 436 MS
EKG R AXIS: 102 DEGREES
EKG R AXIS: 98 DEGREES
EKG T AXIS: 80 DEGREES
EKG T AXIS: 84 DEGREES
EKG VENTRICULAR RATE: 69 BPM
EKG VENTRICULAR RATE: 97 BPM
ERYTHROCYTE [DISTWIDTH] IN BLOOD BY AUTOMATED COUNT: 12.7 % (ref 11.5–15)
GFR, ESTIMATED: 66 ML/MIN/1.73M2
GLUCOSE SERPL-MCNC: 97 MG/DL (ref 74–99)
HCT VFR BLD AUTO: 46.2 % (ref 34–48)
HGB BLD-MCNC: 15.1 G/DL (ref 11.5–15.5)
MAGNESIUM SERPL-MCNC: 1.9 MG/DL (ref 1.6–2.6)
MCH RBC QN AUTO: 29.3 PG (ref 26–35)
MCHC RBC AUTO-ENTMCNC: 32.7 G/DL (ref 32–34.5)
MCV RBC AUTO: 89.5 FL (ref 80–99.9)
PLATELET # BLD AUTO: 154 K/UL (ref 130–450)
PMV BLD AUTO: 11.8 FL (ref 7–12)
POTASSIUM SERPL-SCNC: 5.1 MMOL/L (ref 3.5–5)
PROT SERPL-MCNC: 7.5 G/DL (ref 6.4–8.3)
RBC # BLD AUTO: 5.16 M/UL (ref 3.5–5.5)
SODIUM SERPL-SCNC: 137 MMOL/L (ref 132–146)
WBC OTHER # BLD: 8 K/UL (ref 4.5–11.5)

## 2024-10-29 PROCEDURE — 80053 COMPREHEN METABOLIC PANEL: CPT

## 2024-10-29 PROCEDURE — 7100000010 HC PHASE II RECOVERY - FIRST 15 MIN: Performed by: STUDENT IN AN ORGANIZED HEALTH CARE EDUCATION/TRAINING PROGRAM

## 2024-10-29 PROCEDURE — 2580000003 HC RX 258

## 2024-10-29 PROCEDURE — 93010 ELECTROCARDIOGRAM REPORT: CPT | Performed by: INTERNAL MEDICINE

## 2024-10-29 PROCEDURE — 92960 CARDIOVERSION ELECTRIC EXT: CPT

## 2024-10-29 PROCEDURE — 83735 ASSAY OF MAGNESIUM: CPT

## 2024-10-29 PROCEDURE — 85027 COMPLETE CBC AUTOMATED: CPT

## 2024-10-29 PROCEDURE — 92960 CARDIOVERSION ELECTRIC EXT: CPT | Performed by: STUDENT IN AN ORGANIZED HEALTH CARE EDUCATION/TRAINING PROGRAM

## 2024-10-29 PROCEDURE — 93005 ELECTROCARDIOGRAM TRACING: CPT | Performed by: STUDENT IN AN ORGANIZED HEALTH CARE EDUCATION/TRAINING PROGRAM

## 2024-10-29 PROCEDURE — 3700000000 HC ANESTHESIA ATTENDED CARE: Performed by: STUDENT IN AN ORGANIZED HEALTH CARE EDUCATION/TRAINING PROGRAM

## 2024-10-29 PROCEDURE — 6360000002 HC RX W HCPCS

## 2024-10-29 RX ORDER — SODIUM CHLORIDE 9 MG/ML
INJECTION, SOLUTION INTRAVENOUS PRN
Status: DISCONTINUED | OUTPATIENT
Start: 2024-10-29 | End: 2024-10-29 | Stop reason: HOSPADM

## 2024-10-29 RX ORDER — SODIUM CHLORIDE 9 MG/ML
INJECTION, SOLUTION INTRAVENOUS
Status: DISCONTINUED | OUTPATIENT
Start: 2024-10-29 | End: 2024-10-29 | Stop reason: SDUPTHER

## 2024-10-29 RX ORDER — SODIUM CHLORIDE 0.9 % (FLUSH) 0.9 %
5-40 SYRINGE (ML) INJECTION EVERY 12 HOURS SCHEDULED
Status: DISCONTINUED | OUTPATIENT
Start: 2024-10-29 | End: 2024-10-29 | Stop reason: HOSPADM

## 2024-10-29 RX ORDER — SODIUM CHLORIDE 0.9 % (FLUSH) 0.9 %
5-40 SYRINGE (ML) INJECTION PRN
Status: DISCONTINUED | OUTPATIENT
Start: 2024-10-29 | End: 2024-10-29 | Stop reason: HOSPADM

## 2024-10-29 RX ORDER — PROPOFOL 10 MG/ML
INJECTION, EMULSION INTRAVENOUS
Status: DISCONTINUED | OUTPATIENT
Start: 2024-10-29 | End: 2024-10-29 | Stop reason: SDUPTHER

## 2024-10-29 RX ADMIN — PROPOFOL 30 MG: 10 INJECTION, EMULSION INTRAVENOUS at 11:41

## 2024-10-29 RX ADMIN — PROPOFOL 60 MG: 10 INJECTION, EMULSION INTRAVENOUS at 11:40

## 2024-10-29 RX ADMIN — SODIUM CHLORIDE: 9 INJECTION, SOLUTION INTRAVENOUS at 11:21

## 2024-10-29 ASSESSMENT — LIFESTYLE VARIABLES: SMOKING_STATUS: 1

## 2024-10-29 NOTE — ANESTHESIA POSTPROCEDURE EVALUATION
Department of Anesthesiology  Postprocedure Note    Patient: Moon Holland  MRN: [id number]  YOB: 1955  Date of evaluation: 10/29/2024    Procedure Summary       Date: 10/29/24 Room / Location: [facility] Cardiac Cath Lab    Anesthesia Start: 1138 Anesthesia Stop: 1147    Procedure: CARDIOVERSION EXTERNAL Diagnosis:       Paroxysmal atrial fibrillation (HCC)      Paroxysmal atrial fibrillation (HCC)    Scheduled Providers: Kylah Zapata MD Responsible Provider: Kylah Zapata MD    Anesthesia Type: MAC ASA Status: 3            Anesthesia Type: No value filed.    Esteban Phase I:      Esteban Phase II:      Anesthesia Post Evaluation    Patient location during evaluation: PACU  Patient participation: complete - patient participated  Level of consciousness: awake  Pain score: 0  Airway patency: patent  Nausea & Vomiting: no nausea  Cardiovascular status: hemodynamically stable  Respiratory status: acceptable  Hydration status: stable    No notable events documented.

## 2024-10-29 NOTE — DISCHARGE INSTRUCTIONS
DISCHARGE INSTRUCTIONS  - Medications: no medication changes at this time.  - Driving: no driving or operating heavy machinery for 24 hours.  - Follow-up:  -- ECG at Dr Barnes office: 11/4/2024 at 1:20 PM  -- Appointment at Dr Barnes office: 12/12/2024 at 8 AM

## 2024-10-29 NOTE — ANESTHESIA PRE PROCEDURE
the last 72 hours.    Coags:   Lab Results   Component Value Date/Time    PROTIME 18.9 04/01/2021 10:14 AM    INR 1.7 04/01/2021 10:14 AM    APTT 39.4 04/01/2021 10:14 AM       HCG (If Applicable): No results found for: \"PREGTESTUR\", \"PREGSERUM\", \"HCG\", \"HCGQUANT\"     ABGs: No results found for: \"PHART\", \"PO2ART\", \"VHR3VED\", \"NKW4JTR\", \"BEART\", \"W1OYUEBY\"     Type & Screen (If Applicable):  No results found for: \"LABABO\"    Drug/Infectious Status (If Applicable):  No results found for: \"HIV\", \"HEPCAB\"    COVID-19 Screening (If Applicable):   Lab Results   Component Value Date/Time    COVID19 Not Detected 03/28/2021 08:40 AM    COVID19 Not Detected 12/30/2020 02:10 PM       ECG 09/26/2024  Atrial fibrillation -irregular conduction   -Poor R-wave progression -nonspecific -consider old anterior infarct.    ABNORMAL RHYTHM    ECHO 11/04/2021  Left Ventricle   Left ventricle is normal in size .   No regional wall motion abnormalities seen.   Normal left ventricular ejection fraction.   Ejection fraction is visually estimated at 55-60%(calculated 59%).   Indeterminate diastolic function.      Right Ventricle   Normal right ventricular size and function.      Left Atrium   Normal sized left atrium.   Interatrial septum appears intact.      Right Atrium   Normal right atrium size.      Mitral Valve   Mild thickening of the mitral valve leaflets.      Tricuspid Valve   The tricuspid valve appears structurally normal.      Aortic Valve   The aortic valve appears mildly sclerotic.      Pulmonic Valve   Pulmonic valve is structurally normal.   Mild pulmonic regurgitation present.      Pericardial Effusion   No evidence of pericardial effusion.      Aorta   Aortic root dimension within normal limits.      Conclusions   Summary   Left ventricle is normal in size .   No regional wall motion abnormalities seen.   Normal left ventricular ejection fraction.   Mild thickening of the mitral valve leaflets.   The aortic valve appears

## 2024-11-04 ENCOUNTER — NURSE ONLY (OUTPATIENT)
Dept: NON INVASIVE DIAGNOSTICS | Age: 69
End: 2024-11-04
Payer: MEDICARE

## 2024-11-04 VITALS — SYSTOLIC BLOOD PRESSURE: 132 MMHG | HEART RATE: 56 BPM | RESPIRATION RATE: 16 BRPM | DIASTOLIC BLOOD PRESSURE: 70 MMHG

## 2024-11-04 DIAGNOSIS — I48.91 ATRIAL FIBRILLATION WITH RVR (HCC): Primary | ICD-10-CM

## 2024-11-04 PROCEDURE — 93000 ELECTROCARDIOGRAM COMPLETE: CPT | Performed by: STUDENT IN AN ORGANIZED HEALTH CARE EDUCATION/TRAINING PROGRAM

## 2024-11-06 ENCOUNTER — HOSPITAL ENCOUNTER (OUTPATIENT)
Dept: CARDIOLOGY | Age: 69
Discharge: HOME | End: 2024-11-08
Attending: INTERNAL MEDICINE
Payer: MEDICARE

## 2024-11-06 VITALS
SYSTOLIC BLOOD PRESSURE: 132 MMHG | WEIGHT: 161 LBS | DIASTOLIC BLOOD PRESSURE: 70 MMHG | HEIGHT: 66 IN | BODY MASS INDEX: 25.88 KG/M2

## 2024-11-06 DIAGNOSIS — I48.0 PAF (PAROXYSMAL ATRIAL FIBRILLATION) (HCC): ICD-10-CM

## 2024-11-06 PROCEDURE — 93306 TTE W/DOPPLER COMPLETE: CPT

## 2024-11-07 LAB
ECHO AO ASC DIAM: 2.9 CM
ECHO AO ASCENDING AORTA INDEX: 1.59 CM/M2
ECHO AV AREA PEAK VELOCITY: 1.1 CM2
ECHO AV AREA VTI: 1 CM2
ECHO AV AREA/BSA PEAK VELOCITY: 0.6 CM2/M2
ECHO AV AREA/BSA VTI: 0.5 CM2/M2
ECHO AV CUSP MM: 1.8 CM
ECHO AV MEAN GRADIENT: 6 MMHG
ECHO AV MEAN VELOCITY: 1.2 M/S
ECHO AV PEAK GRADIENT: 10 MMHG
ECHO AV PEAK VELOCITY: 1.6 M/S
ECHO AV VELOCITY RATIO: 0.44
ECHO AV VTI: 40.5 CM
ECHO BSA: 1.84 M2
ECHO LA DIAMETER INDEX: 1.98 CM/M2
ECHO LA DIAMETER: 3.6 CM
ECHO LA VOL A-L A2C: 81 ML (ref 22–52)
ECHO LA VOL A-L A4C: 57 ML (ref 22–52)
ECHO LA VOL MOD A2C: 78 ML (ref 22–52)
ECHO LA VOL MOD A4C: 53 ML (ref 22–52)
ECHO LA VOLUME AREA LENGTH: 70 ML
ECHO LA VOLUME INDEX A-L A2C: 45 ML/M2 (ref 16–34)
ECHO LA VOLUME INDEX A-L A4C: 31 ML/M2 (ref 16–34)
ECHO LA VOLUME INDEX AREA LENGTH: 38 ML/M2 (ref 16–34)
ECHO LA VOLUME INDEX MOD A2C: 43 ML/M2 (ref 16–34)
ECHO LA VOLUME INDEX MOD A4C: 29 ML/M2 (ref 16–34)
ECHO LV EF PHYSICIAN: 60 %
ECHO LV FRACTIONAL SHORTENING: 31 % (ref 28–44)
ECHO LV INTERNAL DIMENSION DIASTOLE INDEX: 2.64 CM/M2
ECHO LV INTERNAL DIMENSION DIASTOLIC: 4.8 CM (ref 3.9–5.3)
ECHO LV INTERNAL DIMENSION SYSTOLIC INDEX: 1.81 CM/M2
ECHO LV INTERNAL DIMENSION SYSTOLIC: 3.3 CM
ECHO LV ISOVOLUMETRIC RELAXATION TIME (IVRT): 66.9 MS
ECHO LV IVSD: 0.7 CM (ref 0.6–0.9)
ECHO LV IVSS: 1.2 CM
ECHO LV MASS 2D: 106.9 G (ref 67–162)
ECHO LV MASS INDEX 2D: 58.7 G/M2 (ref 43–95)
ECHO LV POSTERIOR WALL DIASTOLIC: 0.7 CM (ref 0.6–0.9)
ECHO LV POSTERIOR WALL SYSTOLIC: 1.2 CM
ECHO LV RELATIVE WALL THICKNESS RATIO: 0.29
ECHO LVOT AREA: 2.8 CM2
ECHO LVOT AV VTI INDEX: 0.36
ECHO LVOT DIAM: 1.9 CM
ECHO LVOT MEAN GRADIENT: 1 MMHG
ECHO LVOT PEAK GRADIENT: 2 MMHG
ECHO LVOT PEAK VELOCITY: 0.7 M/S
ECHO LVOT STROKE VOLUME INDEX: 22.4 ML/M2
ECHO LVOT SV: 40.8 ML
ECHO LVOT VTI: 14.4 CM
ECHO MV "A" WAVE DURATION: 87.7 MSEC
ECHO MV A VELOCITY: 0.42 M/S
ECHO MV AREA PHT: 2.6 CM2
ECHO MV AREA VTI: 1.3 CM2
ECHO MV E DECELERATION TIME (DT): 208.7 MS
ECHO MV E VELOCITY: 1.05 M/S
ECHO MV E/A RATIO: 2.5
ECHO MV LVOT VTI INDEX: 2.16
ECHO MV MAX VELOCITY: 1.3 M/S
ECHO MV MEAN GRADIENT: 2 MMHG
ECHO MV MEAN VELOCITY: 0.6 M/S
ECHO MV PEAK GRADIENT: 6 MMHG
ECHO MV PRESSURE HALF TIME (PHT): 85.9 MS
ECHO MV VTI: 31.1 CM
ECHO PV MAX VELOCITY: 0.8 M/S
ECHO PV MEAN GRADIENT: 1 MMHG
ECHO PV MEAN VELOCITY: 0.6 M/S
ECHO PV PEAK GRADIENT: 3 MMHG
ECHO PV VTI: 24.3 CM
ECHO PVEIN A DURATION: 83 MS
ECHO PVEIN A VELOCITY: 0.2 M/S
ECHO PVEIN PEAK D VELOCITY: 0.9 M/S
ECHO PVEIN PEAK S VELOCITY: 0.5 M/S
ECHO PVEIN S/D RATIO: 0.6

## 2024-11-08 PROBLEM — I35.1 AORTIC REGURGITATION: Status: ACTIVE | Noted: 2024-11-08

## 2024-11-08 PROBLEM — I35.0 AORTIC STENOSIS: Status: ACTIVE | Noted: 2024-11-08

## 2024-11-11 ENCOUNTER — TELEPHONE (OUTPATIENT)
Dept: CARDIOLOGY CLINIC | Age: 69
End: 2024-11-11

## 2024-11-11 NOTE — TELEPHONE ENCOUNTER
----- Message from Dr. Santana Mathias, DO sent at 11/8/2024  7:37 PM EST -----  Let her know that her heart function is good.  There is mild aortic stenosis.  This is only mild and not enough to cause any symptoms or problems just needs to be followed with repeat echoes every 2 to 3 years.  No changes

## 2024-12-04 ENCOUNTER — TELEPHONE (OUTPATIENT)
Dept: CARDIOLOGY CLINIC | Age: 69
End: 2024-12-04

## 2024-12-04 NOTE — TELEPHONE ENCOUNTER
Patient needs cardiac clearance for left anterior total hip arthroplasty by Dr. García at [facility] on 1/14/25.     Patient was initially risk assessed on 9/25/24. Patient denies any chest pain, shortness of breath or palpitations since last visit in September 2024.  Had cardioversion in October 2024. Patient is able to complete all activities of daily living, including; climbing one flight of stairs and walking one block without cardiac symptoms. Patient on [facility].

## 2024-12-06 NOTE — TELEPHONE ENCOUNTER
I will defer the Eliquis to electrophysiology.  No change from her preop restratification in September.  She continues to be classified as low risk for perioperative ischemic cardiac events.  No further preop cardiac testing is needed.

## 2024-12-06 NOTE — TELEPHONE ENCOUNTER
Patient notified of Dr. Mathias's risk assessment/recommendation. Patient states she already has recommendations from EP to hold Eliquis (3) days prior and restart the following day.  Note faxed to [facility] [phone].

## 2024-12-13 ENCOUNTER — APPOINTMENT (OUTPATIENT)
Dept: GENERAL RADIOLOGY | Age: 69
End: 2024-12-13
Payer: MEDICARE

## 2024-12-13 ENCOUNTER — HOSPITAL ENCOUNTER (EMERGENCY)
Age: 69
Discharge: HOME | End: 2024-12-13
Attending: EMERGENCY MEDICINE
Payer: MEDICARE

## 2024-12-13 VITALS
WEIGHT: 163 LBS | BODY MASS INDEX: 24.71 KG/M2 | OXYGEN SATURATION: 95 % | HEART RATE: 59 BPM | DIASTOLIC BLOOD PRESSURE: 79 MMHG | TEMPERATURE: 97.4 F | HEIGHT: 68 IN | SYSTOLIC BLOOD PRESSURE: 129 MMHG | RESPIRATION RATE: 17 BRPM

## 2024-12-13 DIAGNOSIS — S42.92XA CLOSED FRACTURE OF LEFT SHOULDER, INITIAL ENCOUNTER: Primary | ICD-10-CM
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
## 2024-12-13 PROCEDURE — 29105 APPLICATION LONG ARM SPLINT: CPT

## 2024-12-13 PROCEDURE — 71046 X-RAY EXAM CHEST 2 VIEWS: CPT

## 2024-12-13 PROCEDURE — 99283 EMERGENCY DEPT VISIT LOW MDM: CPT

## 2024-12-13 PROCEDURE — 73030 X-RAY EXAM OF SHOULDER: CPT

## 2024-12-13 PROCEDURE — 6370000000 HC RX 637 (ALT 250 FOR IP): Performed by: EMERGENCY MEDICINE

## 2024-12-13 PROCEDURE — 73502 X-RAY EXAM HIP UNI 2-3 VIEWS: CPT

## 2024-12-13 RX ORDER — HYDROCODONE BITARTRATE AND ACETAMINOPHEN 5; 325 MG/1; MG/1
1 TABLET ORAL EVERY 6 HOURS PRN
Qty: 12 TABLET | Refills: 0 | Status: SHIPPED | OUTPATIENT
Start: 2024-12-13 | End: 2024-12-16

## 2024-12-13 RX ORDER — ACETAMINOPHEN 325 MG/1
650 TABLET ORAL ONCE
Status: COMPLETED | OUTPATIENT
Start: 2024-12-13 | End: 2024-12-13

## 2024-12-13 RX ADMIN — ACETAMINOPHEN 650 MG: 325 TABLET ORAL at 21:12

## 2024-12-13 ASSESSMENT — LIFESTYLE VARIABLES
HOW OFTEN DO YOU HAVE A DRINK CONTAINING ALCOHOL: NEVER
HOW MANY STANDARD DRINKS CONTAINING ALCOHOL DO YOU HAVE ON A TYPICAL DAY: PATIENT DOES NOT DRINK

## 2024-12-13 ASSESSMENT — PAIN DESCRIPTION - LOCATION: LOCATION: ARM

## 2024-12-13 ASSESSMENT — PAIN SCALES - GENERAL: PAINLEVEL_OUTOF10: 5

## 2024-12-13 ASSESSMENT — PAIN DESCRIPTION - ORIENTATION: ORIENTATION: LEFT

## 2024-12-13 ASSESSMENT — PAIN - FUNCTIONAL ASSESSMENT: PAIN_FUNCTIONAL_ASSESSMENT: PREVENTS OR INTERFERES SOME ACTIVE ACTIVITIES AND ADLS

## 2024-12-13 ASSESSMENT — PAIN DESCRIPTION - DESCRIPTORS: DESCRIPTORS: ACHING

## 2024-12-14 NOTE — ED PROVIDER NOTES
LESION LEFT (09/30/2020); [facility] US GUIDED PLACE LOC DEVICE PERC 1ST LESION (09/30/2020); Port Surgery (N/A, 11/13/2020); Port Surgery (N/A, 12/06/2020); transthoracic echocardiogram (05/2018); Colonoscopy (2010); Upper gastrointestinal endoscopy (N/A, 12/31/2020); Port Surgery (N/A, 01/15/2021); Port Surgery (N/A, 04/29/2022); Breast surgery; and Breast lumpectomy.    Social History:  reports that she has never smoked. She has never used smokeless tobacco. She reports that she does not currently use alcohol. She reports that she does not use drugs.    Family History: family history includes Breast Cancer in her sister; Heart Disease in her mother; Ovarian Cancer in her maternal aunt and sister; Prostate Cancer in her father.     The patient’s home medications have been reviewed.    Allergies: Adhesive tape    -------------------------------------------------- RESULTS -------------------------------------------------  All laboratory and radiology results have been personally reviewed by myself   LABS:  No results found for this visit on 12/13/24.    RADIOLOGY:  Interpreted by Radiologist.  XR SHOULDER LEFT (MIN 2 VIEWS)   Final Result   1.  LEFT SHOULDER:      1.  Acute comminuted impacted capital/subcapital fracture through the   anatomic neck with avulsion of the greater trochanter of the proximal left   humerus.  No dislocation of the glenohumeral joint.      2.  PELVIS/LEFT HIP:      1.  No acute fractures or dislocations of the pelvic bones and hip joints.      2.  Longstanding deformity of the left hip joint with reshaping of the   articular surface and advanced degenerative changes as above discussed.         XR HIP 2-3 VW W PELVIS LEFT   Final Result   1.  LEFT SHOULDER:      1.  Acute comminuted impacted capital/subcapital fracture through the   anatomic neck with avulsion of the greater trochanter of the proximal left   humerus.  No dislocation of the glenohumeral joint.      2.  PELVIS/LEFT HIP:      1.  No

## 2025-02-05 RX ORDER — METOPROLOL SUCCINATE 100 MG/1
TABLET, EXTENDED RELEASE ORAL
Qty: 180 TABLET | Refills: 3 | Status: SHIPPED | OUTPATIENT
Start: 2025-02-05

## 2025-02-05 RX ORDER — METOPROLOL SUCCINATE 50 MG/1
TABLET, EXTENDED RELEASE ORAL
Qty: 180 TABLET | Refills: 3 | Status: SHIPPED | OUTPATIENT
Start: 2025-02-05

## 2025-02-14 ENCOUNTER — HOSPITAL ENCOUNTER (OUTPATIENT)
Age: 70
Discharge: HOME | End: 2025-02-16
Payer: MEDICARE

## 2025-02-14 ENCOUNTER — HOSPITAL ENCOUNTER (OUTPATIENT)
Dept: GENERAL RADIOLOGY | Age: 70
Discharge: HOME | End: 2025-02-16
Payer: MEDICARE

## 2025-02-14 DIAGNOSIS — R05.9 COMPLAINING OF COUGH: ICD-10-CM

## 2025-02-14 PROCEDURE — 71046 X-RAY EXAM CHEST 2 VIEWS: CPT

## 2025-03-04 ENCOUNTER — OFFICE VISIT (OUTPATIENT)
Dept: NON INVASIVE DIAGNOSTICS | Age: 70
End: 2025-03-04
Payer: MEDICARE

## 2025-03-04 VITALS
HEIGHT: 66 IN | TEMPERATURE: 96.4 F | BODY MASS INDEX: 26.68 KG/M2 | DIASTOLIC BLOOD PRESSURE: 74 MMHG | OXYGEN SATURATION: 96 % | WEIGHT: 166 LBS | SYSTOLIC BLOOD PRESSURE: 126 MMHG | HEART RATE: 55 BPM | RESPIRATION RATE: 18 BRPM

## 2025-03-04 DIAGNOSIS — Z79.01 ANTICOAGULATED: ICD-10-CM

## 2025-03-04 DIAGNOSIS — I48.0 PAF (PAROXYSMAL ATRIAL FIBRILLATION) (HCC): Primary | ICD-10-CM

## 2025-03-04 PROCEDURE — 1123F ACP DISCUSS/DSCN MKR DOCD: CPT | Performed by: NURSE PRACTITIONER

## 2025-03-04 PROCEDURE — G8427 DOCREV CUR MEDS BY ELIG CLIN: HCPCS | Performed by: NURSE PRACTITIONER

## 2025-03-04 PROCEDURE — 3074F SYST BP LT 130 MM HG: CPT | Performed by: NURSE PRACTITIONER

## 2025-03-04 PROCEDURE — 1090F PRES/ABSN URINE INCON ASSESS: CPT | Performed by: NURSE PRACTITIONER

## 2025-03-04 PROCEDURE — 3078F DIAST BP <80 MM HG: CPT | Performed by: NURSE PRACTITIONER

## 2025-03-04 PROCEDURE — 93000 ELECTROCARDIOGRAM COMPLETE: CPT | Performed by: STUDENT IN AN ORGANIZED HEALTH CARE EDUCATION/TRAINING PROGRAM

## 2025-03-04 PROCEDURE — G8399 PT W/DXA RESULTS DOCUMENT: HCPCS | Performed by: NURSE PRACTITIONER

## 2025-03-04 PROCEDURE — 1036F TOBACCO NON-USER: CPT | Performed by: NURSE PRACTITIONER

## 2025-03-04 PROCEDURE — G8419 CALC BMI OUT NRM PARAM NOF/U: HCPCS | Performed by: NURSE PRACTITIONER

## 2025-03-04 PROCEDURE — 1159F MED LIST DOCD IN RCRD: CPT | Performed by: NURSE PRACTITIONER

## 2025-03-04 PROCEDURE — 3017F COLORECTAL CA SCREEN DOC REV: CPT | Performed by: NURSE PRACTITIONER

## 2025-03-04 PROCEDURE — 99214 OFFICE O/P EST MOD 30 MIN: CPT | Performed by: NURSE PRACTITIONER

## 2025-03-04 RX ORDER — NICOTINE 14MG/24HR
PATCH, TRANSDERMAL 24 HOURS TRANSDERMAL
COMMUNITY

## 2025-03-04 NOTE — PROGRESS NOTES
Mild      C. difficile diarrhea 01/01/2021    Hypertension     Stroke due to embolism of right posterior cerebral artery (HCC)      Overview Note:     2/10/2018      History of 2019 novel coronavirus disease (COVID-19) 12/30/2020     Overview Note:     12/3/2020      GI bleed 12/29/2020    Septic shock with acute organ dysfunction due to Gram positive cocci (HCC) 12/04/2020    COVID-19 12/04/2020    Atrial fibrillation with RVR (HCC) 12/04/2020    Breast CA (HCC) 12/03/2020    SARAN (acute kidney injury) 12/03/2020    Chemotherapy adverse reaction 12/03/2020    Leukocytosis 12/03/2020    PAF (paroxysmal atrial fibrillation) (HCC) 02/26/2018    Acute CVA (cerebrovascular accident) (HCC) 02/10/2018     Overview Note:     2/10/2018      Transient cerebral ischemia     TIA (transient ischemic attack) 02/09/2018       Past Medical History:   Diagnosis Date    Acute CVA (cerebrovascular accident) (HCC) 02/10/2018    SARAN (acute kidney injury) 12/03/2020    Atrial fibrillation with RVR (HCC) 12/04/2020    Breast CA (HCC) 12/03/2020    Breast cancer (HCC)     C. difficile diarrhea 01/01/2021    resolved    Cancer (HCC) 11/2020    Breast-left and lymph nodes     Chemotherapy adverse reaction 12/03/2020    GI bleed 12/29/2020    resolved    History of 2019 novel coronavirus disease (COVID-19) 12/03/2000    History of blood transfusion 2020     4 units    History of therapeutic radiation     Hx antineoplastic chemo     Hx of blood clots     Hyperlipidemia     Hypertension     Leukocytosis 12/03/2020    Osteoarthritis     PAF (paroxysmal atrial fibrillation) (HCC) 02/26/2018    Septic shock with acute organ dysfunction due to Gram positive cocci (HCC) 12/04/2020    Stroke due to embolism of right posterior cerebral artery (HCC) 02/10/2018    TIA (transient ischemic attack) 02/09/2018    Transient cerebral ischemia        Family History   Problem Relation Age of Onset    Heart Disease Mother     Prostate Cancer Father

## 2025-03-21 ENCOUNTER — HOSPITAL ENCOUNTER (OUTPATIENT)
Age: 70
Discharge: HOME | End: 2025-03-23

## 2025-03-21 LAB
ABO + RH BLD: NORMAL
ALBUMIN SERPL-MCNC: 4 G/DL (ref 3.5–5.2)
ANION GAP SERPL CALCULATED.3IONS-SCNC: 15 MMOL/L (ref 7–16)
ARM BAND NUMBER: NORMAL
BLOOD BANK SAMPLE EXPIRATION: NORMAL
BLOOD GROUP ANTIBODIES SERPL: NEGATIVE
BUN SERPL-MCNC: 18 MG/DL (ref 6–23)
CALCIUM SERPL-MCNC: 10 MG/DL (ref 8.6–10.2)
CHLORIDE SERPL-SCNC: 102 MMOL/L (ref 98–107)
CO2 SERPL-SCNC: 24 MMOL/L (ref 22–29)
CREAT SERPL-MCNC: 1 MG/DL (ref 0.5–1)
GFR, ESTIMATED: 61 ML/MIN/1.73M2
GLUCOSE SERPL-MCNC: 159 MG/DL (ref 74–99)
HBA1C MFR BLD: 5.8 % (ref 4–5.6)
INR PPP: 1.5
PARTIAL THROMBOPLASTIN TIME: 33.9 SEC (ref 24.5–35.1)
POTASSIUM SERPL-SCNC: 4.4 MMOL/L (ref 3.5–5)
PROTHROMBIN TIME: 15.5 SEC (ref 9.3–12.4)
SODIUM SERPL-SCNC: 141 MMOL/L (ref 132–146)

## 2025-03-21 PROCEDURE — 83036 HEMOGLOBIN GLYCOSYLATED A1C: CPT

## 2025-03-21 PROCEDURE — 86901 BLOOD TYPING SEROLOGIC RH(D): CPT

## 2025-03-21 PROCEDURE — 86850 RBC ANTIBODY SCREEN: CPT

## 2025-03-21 PROCEDURE — 85610 PROTHROMBIN TIME: CPT

## 2025-03-21 PROCEDURE — 86900 BLOOD TYPING SEROLOGIC ABO: CPT

## 2025-03-21 PROCEDURE — 82040 ASSAY OF SERUM ALBUMIN: CPT

## 2025-03-21 PROCEDURE — 87081 CULTURE SCREEN ONLY: CPT

## 2025-03-21 PROCEDURE — 85730 THROMBOPLASTIN TIME PARTIAL: CPT

## 2025-03-21 PROCEDURE — 80048 BASIC METABOLIC PNL TOTAL CA: CPT

## 2025-03-23 LAB
MICROORGANISM SPEC CULT: NORMAL
SPECIMEN DESCRIPTION: NORMAL

## 2025-03-28 ENCOUNTER — HOSPITAL ENCOUNTER (OUTPATIENT)
Age: 70
Discharge: HOME | End: 2025-03-30

## 2025-03-28 LAB
ANION GAP SERPL CALCULATED.3IONS-SCNC: 13 MMOL/L (ref 7–16)
BUN SERPL-MCNC: 18 MG/DL (ref 6–23)
CALCIUM SERPL-MCNC: 9.3 MG/DL (ref 8.6–10.2)
CHLORIDE SERPL-SCNC: 105 MMOL/L (ref 98–107)
CO2 SERPL-SCNC: 22 MMOL/L (ref 22–29)
CREAT SERPL-MCNC: 0.8 MG/DL (ref 0.5–1)
ERYTHROCYTE [DISTWIDTH] IN BLOOD BY AUTOMATED COUNT: 13.7 % (ref 11.5–15)
GFR, ESTIMATED: 82 ML/MIN/1.73M2
GLUCOSE SERPL-MCNC: 135 MG/DL (ref 74–99)
HCT VFR BLD AUTO: 32.7 % (ref 34–48)
HGB BLD-MCNC: 10.1 G/DL (ref 11.5–15.5)
MCH RBC QN AUTO: 28.2 PG (ref 26–35)
MCHC RBC AUTO-ENTMCNC: 30.9 G/DL (ref 32–34.5)
MCV RBC AUTO: 91.3 FL (ref 80–99.9)
PLATELET # BLD AUTO: 144 K/UL (ref 130–450)
PMV BLD AUTO: 13 FL (ref 7–12)
POTASSIUM SERPL-SCNC: 4.5 MMOL/L (ref 3.5–5)
RBC # BLD AUTO: 3.58 M/UL (ref 3.5–5.5)
SODIUM SERPL-SCNC: 140 MMOL/L (ref 132–146)
WBC OTHER # BLD: 16.2 K/UL (ref 4.5–11.5)

## 2025-03-28 PROCEDURE — 85027 COMPLETE CBC AUTOMATED: CPT

## 2025-03-28 PROCEDURE — 80048 BASIC METABOLIC PNL TOTAL CA: CPT

## 2025-04-02 ENCOUNTER — TELEPHONE (OUTPATIENT)
Dept: NON INVASIVE DIAGNOSTICS | Age: 70
End: 2025-04-02

## 2025-04-02 NOTE — TELEPHONE ENCOUNTER
The patient left a message as an FYI that when she registered for her hip surgery on 3/27/2025 with Dr. García, she was in AF. She reported everything being fine and all of her vitals are on the normal low end. Message sent to physician.     Electronically signed by Bri Goodman MA on 4/2/2025 at 7:48 AM

## 2025-05-06 NOTE — PROGRESS NOTES
[facility] Elect[facility]iology  Outpatient Progress Note  Moon Holland  1955  Date of Service: 5/6/2025  PCP: Gil Albarran Jr., MD  Electrophysiologist: Dr. Barnes         Subjective: Moon Holland is seen for follow-up and management of: afib s/p cardioversion       PMH as noted below significant for nonvalvular paroxysmal AF sp DCCV x2 (4/1/2021, 7/26/2023), CVA (2018), HTN, GI bleed/C. difficile colitis (2020), and stage III left breast CA on chemotherapy/radiation (completed in 2022) and lumpectomy (2021). In 2018, patient had CVA and was diagnosed with AF shortly thereafter, which was managed with OAC.  In 2020, she had GI bleed/C. difficile colitis which required PRBCs.  In 2021, she had recurrence of AF, which was treated with DCCV.  In 2023, she recurrence of AF, which was treated with DCCV.  In 10/2023, she was referred to Dr. Barnes for, who recommended rhythm control due to patient's relatively young age.  On 10/29/2024, she underwent DCCV.  When seen in clinic on 03/04/25 she was in sinus. Today 05/07/2025 she reports that when  in preparation for her hip surgery on 03/27/25 she was found to be in AF she is asymptomatic with this recurrent AF. She has been complaint with Eliquis and has no complaints of bleeding at this time. She is agreeable to repeat DCCV use of Ablation as well as AAD was discussed today.             Prior cardiac testing:  - TTE (11/06/2024): LVEF 60%, mild AS and mild LA dilatation.   - Limited TTE (11/4/2021): LVEF = 55-60%, normal.  - Limited TTE (8/3/2021): LVEF = 55-60%, average LV global longitudinal strain = -17%.  - Limited TTE (5/1/2021): LVEF = 55%, normal.  - Limited TTE (12/8/2020): LVEF = 60-65%, mild AI.  - ISAÍAS (5/21/2018): LVEF = 50-55%, no PFO, moderate LAE, no LA/ZANDER thrombus, mild MR.  - TTE (2/12/2018): LVEF = 55%, normal.       Patient Active Problem List    Diagnosis Date Noted    Swollen

## 2025-05-07 ENCOUNTER — OFFICE VISIT (OUTPATIENT)
Dept: NON INVASIVE DIAGNOSTICS | Age: 70
End: 2025-05-07
Payer: MEDICARE

## 2025-05-07 VITALS
HEIGHT: 67 IN | BODY MASS INDEX: 26.53 KG/M2 | HEART RATE: 86 BPM | DIASTOLIC BLOOD PRESSURE: 66 MMHG | TEMPERATURE: 97.5 F | SYSTOLIC BLOOD PRESSURE: 102 MMHG | RESPIRATION RATE: 18 BRPM | OXYGEN SATURATION: 97 % | WEIGHT: 169 LBS

## 2025-05-07 DIAGNOSIS — I48.0 PAF (PAROXYSMAL ATRIAL FIBRILLATION) (HCC): Primary | ICD-10-CM

## 2025-05-07 PROCEDURE — G8419 CALC BMI OUT NRM PARAM NOF/U: HCPCS | Performed by: NURSE PRACTITIONER

## 2025-05-07 PROCEDURE — G8427 DOCREV CUR MEDS BY ELIG CLIN: HCPCS | Performed by: NURSE PRACTITIONER

## 2025-05-07 PROCEDURE — 3078F DIAST BP <80 MM HG: CPT | Performed by: NURSE PRACTITIONER

## 2025-05-07 PROCEDURE — 1160F RVW MEDS BY RX/DR IN RCRD: CPT | Performed by: NURSE PRACTITIONER

## 2025-05-07 PROCEDURE — G8399 PT W/DXA RESULTS DOCUMENT: HCPCS | Performed by: NURSE PRACTITIONER

## 2025-05-07 PROCEDURE — 3017F COLORECTAL CA SCREEN DOC REV: CPT | Performed by: NURSE PRACTITIONER

## 2025-05-07 PROCEDURE — 3074F SYST BP LT 130 MM HG: CPT | Performed by: NURSE PRACTITIONER

## 2025-05-07 PROCEDURE — 93000 ELECTROCARDIOGRAM COMPLETE: CPT | Performed by: STUDENT IN AN ORGANIZED HEALTH CARE EDUCATION/TRAINING PROGRAM

## 2025-05-07 PROCEDURE — 99214 OFFICE O/P EST MOD 30 MIN: CPT | Performed by: NURSE PRACTITIONER

## 2025-05-07 PROCEDURE — 1159F MED LIST DOCD IN RCRD: CPT | Performed by: NURSE PRACTITIONER

## 2025-05-07 PROCEDURE — 1090F PRES/ABSN URINE INCON ASSESS: CPT | Performed by: NURSE PRACTITIONER

## 2025-05-07 PROCEDURE — 1036F TOBACCO NON-USER: CPT | Performed by: NURSE PRACTITIONER

## 2025-05-07 PROCEDURE — 1123F ACP DISCUSS/DSCN MKR DOCD: CPT | Performed by: NURSE PRACTITIONER

## 2025-05-08 ENCOUNTER — TELEPHONE (OUTPATIENT)
Dept: NON INVASIVE DIAGNOSTICS | Age: 70
End: 2025-05-08

## 2025-05-08 NOTE — TELEPHONE ENCOUNTER
----- Message from Jordon Foy, LALA - CNP sent at 5/7/2025  1:32 PM EDT -----  Moon JAREK Holland was seen in clinic today and she had repeat AF and will need DCCV with Dr. Barnes. Thanks.

## 2025-05-08 NOTE — TELEPHONE ENCOUNTER
Patient is scheduled fo r5/28/25 at 11:30 am (arrival of 10:30 am).  Patient given instructions and verbalized understanding.

## 2025-05-15 ENCOUNTER — TELEPHONE (OUTPATIENT)
Dept: NON INVASIVE DIAGNOSTICS | Age: 70
End: 2025-05-15

## 2025-05-15 ENCOUNTER — PREP FOR PROCEDURE (OUTPATIENT)
Dept: NON INVASIVE DIAGNOSTICS | Age: 70
End: 2025-05-15

## 2025-05-15 NOTE — TELEPHONE ENCOUNTER
The patient called with questions about upcoming DC CV. I provided the information requested and the patient verbalized understanding.     Electronically signed by Bri Goodman MA on 5/15/2025 at 1:59 PM

## 2025-05-21 DIAGNOSIS — I48.0 PAF (PAROXYSMAL ATRIAL FIBRILLATION) (HCC): ICD-10-CM

## 2025-05-21 LAB
ALBUMIN: 4.1 G/DL (ref 3.5–5.2)
ALP BLD-CCNC: 290 U/L (ref 35–104)
ALT SERPL-CCNC: 24 U/L (ref 0–35)
ANION GAP SERPL CALCULATED.3IONS-SCNC: 11 MMOL/L (ref 7–16)
AST SERPL-CCNC: 25 U/L (ref 0–35)
BASOPHILS ABSOLUTE: 0.03 K/UL (ref 0–0.2)
BASOPHILS RELATIVE PERCENT: 1 % (ref 0–2)
BILIRUB SERPL-MCNC: 0.5 MG/DL (ref 0–1.2)
BUN BLDV-MCNC: 18 MG/DL (ref 8–23)
CALCIUM SERPL-MCNC: 10.1 MG/DL (ref 8.8–10.2)
CHLORIDE BLD-SCNC: 104 MMOL/L (ref 98–107)
CO2: 25 MMOL/L (ref 22–29)
CREAT SERPL-MCNC: 0.9 MG/DL (ref 0.5–1)
EOSINOPHILS ABSOLUTE: 0.11 K/UL (ref 0.05–0.5)
EOSINOPHILS RELATIVE PERCENT: 2 % (ref 0–6)
GFR, ESTIMATED: 71 ML/MIN/1.73M2
GLUCOSE BLD-MCNC: 106 MG/DL (ref 74–99)
HCT VFR BLD CALC: 41.2 % (ref 34–48)
HEMOGLOBIN: 12.7 G/DL (ref 11.5–15.5)
IMMATURE GRANULOCYTES %: 0 % (ref 0–5)
IMMATURE GRANULOCYTES ABSOLUTE: <0.03 K/UL (ref 0–0.58)
LYMPHOCYTES ABSOLUTE: 1.12 K/UL (ref 1.5–4)
LYMPHOCYTES RELATIVE PERCENT: 20 % (ref 20–42)
MAGNESIUM: 1.8 MG/DL (ref 1.6–2.4)
MCH RBC QN AUTO: 27.5 PG (ref 26–35)
MCHC RBC AUTO-ENTMCNC: 30.8 G/DL (ref 32–34.5)
MCV RBC AUTO: 89.4 FL (ref 80–99.9)
MONOCYTES ABSOLUTE: 0.51 K/UL (ref 0.1–0.95)
MONOCYTES RELATIVE PERCENT: 9 % (ref 2–12)
NEUTROPHILS ABSOLUTE: 3.71 K/UL (ref 1.8–7.3)
NEUTROPHILS RELATIVE PERCENT: 68 % (ref 43–80)
PDW BLD-RTO: 14.3 % (ref 11.5–15)
PLATELET # BLD: 222 K/UL (ref 130–450)
PMV BLD AUTO: 12.1 FL (ref 7–12)
POTASSIUM SERPL-SCNC: 4.8 MMOL/L (ref 3.5–5.1)
RBC # BLD: 4.61 M/UL (ref 3.5–5.5)
SODIUM BLD-SCNC: 140 MMOL/L (ref 136–145)
TOTAL PROTEIN: 7.1 G/DL (ref 6.4–8.3)
WBC # BLD: 5.5 K/UL (ref 4.5–11.5)

## 2025-05-23 ENCOUNTER — TELEPHONE (OUTPATIENT)
Dept: NON INVASIVE DIAGNOSTICS | Age: 70
End: 2025-05-23

## 2025-05-23 DIAGNOSIS — R74.8 ABNORMAL LEVELS OF OTHER SERUM ENZYMES: ICD-10-CM

## 2025-05-23 DIAGNOSIS — R89.9 ABNORMAL LABORATORY TEST RESULT: Primary | ICD-10-CM

## 2025-05-23 NOTE — TELEPHONE ENCOUNTER
----- Message from Dr. Petar Barnes DO sent at 5/22/2025  8:13 AM EDT -----  Regarding: lab  Lab abnormality.    Recommend gamma-glutamyl transferase (GGT) level.    Recommend she follow-up with her PCP for further evaluation of this.    -Petar Barnes DO

## 2025-05-27 RX ORDER — SODIUM CHLORIDE 0.9 % (FLUSH) 0.9 %
5-40 SYRINGE (ML) INJECTION EVERY 12 HOURS SCHEDULED
Status: CANCELLED | OUTPATIENT
Start: 2025-05-27

## 2025-05-27 RX ORDER — SODIUM CHLORIDE 9 MG/ML
INJECTION, SOLUTION INTRAVENOUS PRN
Status: CANCELLED | OUTPATIENT
Start: 2025-05-27

## 2025-05-27 RX ORDER — SODIUM CHLORIDE 0.9 % (FLUSH) 0.9 %
5-40 SYRINGE (ML) INJECTION PRN
Status: CANCELLED | OUTPATIENT
Start: 2025-05-27

## 2025-05-28 ENCOUNTER — HOSPITAL ENCOUNTER (OUTPATIENT)
Age: 70
Discharge: HOME | End: 2025-05-30
Attending: STUDENT IN AN ORGANIZED HEALTH CARE EDUCATION/TRAINING PROGRAM
Payer: MEDICARE

## 2025-05-28 ENCOUNTER — HOSPITAL ENCOUNTER (OUTPATIENT)
Age: 70
Discharge: HOME | End: 2025-05-28
Attending: STUDENT IN AN ORGANIZED HEALTH CARE EDUCATION/TRAINING PROGRAM
Payer: MEDICARE

## 2025-05-28 ENCOUNTER — ANESTHESIA EVENT (OUTPATIENT)
Age: 70
End: 2025-05-28
Payer: MEDICARE

## 2025-05-28 ENCOUNTER — ANESTHESIA (OUTPATIENT)
Age: 70
End: 2025-05-28
Payer: MEDICARE

## 2025-05-28 VITALS
TEMPERATURE: 98.5 F | WEIGHT: 166 LBS | OXYGEN SATURATION: 100 % | RESPIRATION RATE: 18 BRPM | HEART RATE: 63 BPM | BODY MASS INDEX: 26.06 KG/M2 | HEIGHT: 67 IN | DIASTOLIC BLOOD PRESSURE: 89 MMHG | SYSTOLIC BLOOD PRESSURE: 151 MMHG

## 2025-05-28 DIAGNOSIS — R74.8 ABNORMAL LEVELS OF OTHER SERUM ENZYMES: ICD-10-CM

## 2025-05-28 DIAGNOSIS — R89.9 ABNORMAL LABORATORY TEST RESULT: ICD-10-CM

## 2025-05-28 DIAGNOSIS — I48.0 PAROXYSMAL ATRIAL FIBRILLATION (HCC): ICD-10-CM

## 2025-05-28 LAB
ECHO BSA: 1.88 M2
GGT SERPL-CCNC: 60 U/L (ref 5–36)

## 2025-05-28 PROCEDURE — 2580000003 HC RX 258: Performed by: STUDENT IN AN ORGANIZED HEALTH CARE EDUCATION/TRAINING PROGRAM

## 2025-05-28 PROCEDURE — 93005 ELECTROCARDIOGRAM TRACING: CPT | Performed by: STUDENT IN AN ORGANIZED HEALTH CARE EDUCATION/TRAINING PROGRAM

## 2025-05-28 PROCEDURE — 82977 ASSAY OF GGT: CPT

## 2025-05-28 PROCEDURE — 7100000011 HC PHASE II RECOVERY - ADDTL 15 MIN: Performed by: STUDENT IN AN ORGANIZED HEALTH CARE EDUCATION/TRAINING PROGRAM

## 2025-05-28 PROCEDURE — 92960 CARDIOVERSION ELECTRIC EXT: CPT | Performed by: STUDENT IN AN ORGANIZED HEALTH CARE EDUCATION/TRAINING PROGRAM

## 2025-05-28 PROCEDURE — 3700000000 HC ANESTHESIA ATTENDED CARE: Performed by: STUDENT IN AN ORGANIZED HEALTH CARE EDUCATION/TRAINING PROGRAM

## 2025-05-28 PROCEDURE — 7100000010 HC PHASE II RECOVERY - FIRST 15 MIN: Performed by: STUDENT IN AN ORGANIZED HEALTH CARE EDUCATION/TRAINING PROGRAM

## 2025-05-28 PROCEDURE — 36415 COLL VENOUS BLD VENIPUNCTURE: CPT

## 2025-05-28 PROCEDURE — 6360000002 HC RX W HCPCS: Performed by: NURSE ANESTHETIST, CERTIFIED REGISTERED

## 2025-05-28 PROCEDURE — 92960 CARDIOVERSION ELECTRIC EXT: CPT

## 2025-05-28 RX ORDER — SODIUM CHLORIDE 0.9 % (FLUSH) 0.9 %
5-40 SYRINGE (ML) INJECTION EVERY 12 HOURS SCHEDULED
Status: DISCONTINUED | OUTPATIENT
Start: 2025-05-28 | End: 2025-05-31 | Stop reason: HOSPADM

## 2025-05-28 RX ORDER — PROPOFOL 10 MG/ML
INJECTION, EMULSION INTRAVENOUS
Status: DISCONTINUED | OUTPATIENT
Start: 2025-05-28 | End: 2025-05-28 | Stop reason: SDUPTHER

## 2025-05-28 RX ORDER — SODIUM CHLORIDE 9 MG/ML
INJECTION, SOLUTION INTRAVENOUS PRN
Status: DISCONTINUED | OUTPATIENT
Start: 2025-05-28 | End: 2025-05-31 | Stop reason: HOSPADM

## 2025-05-28 RX ORDER — SODIUM CHLORIDE 0.9 % (FLUSH) 0.9 %
5-40 SYRINGE (ML) INJECTION PRN
Status: DISCONTINUED | OUTPATIENT
Start: 2025-05-28 | End: 2025-05-31 | Stop reason: HOSPADM

## 2025-05-28 RX ADMIN — SODIUM CHLORIDE: 9 INJECTION, SOLUTION INTRAVENOUS at 11:19

## 2025-05-28 RX ADMIN — PROPOFOL 50 MG: 10 INJECTION, EMULSION INTRAVENOUS at 11:19

## 2025-05-28 ASSESSMENT — LIFESTYLE VARIABLES: SMOKING_STATUS: 1

## 2025-05-28 NOTE — PROGRESS NOTES
IV removed, discharge instructions given. Patient verbalizes understanding. Patient discharged to home.

## 2025-05-28 NOTE — PROGRESS NOTES
Resumed care of patient. Patient post DCCV. Patient A&O x 3. VSS. Patient taking PO well. Family at bedside.

## 2025-05-28 NOTE — ANESTHESIA POSTPROCEDURE EVALUATION
Department of Anesthesiology  Postprocedure Note    Patient: Moon Holland  MRN: [id number]  YOB: 1955  Date of evaluation: 5/28/2025    Procedure Summary       Date: 05/28/25 Room / Location: [facility] Cardiac Cath Lab    Anesthesia Start: 1116 Anesthesia Stop: 1123    Procedure: CARDIOVERSION EXTERNAL Diagnosis: Paroxysmal atrial fibrillation (HCC)    Scheduled Providers: Gil Gonzalez DO Responsible Provider: Gil Gonzalez DO    Anesthesia Type: MAC ASA Status: 3            Anesthesia Type: No value filed.    Esteban Phase I:      Esteban Phase II:      Anesthesia Post Evaluation    Patient location during evaluation: bedside  Patient participation: complete - patient cannot participate  Level of consciousness: awake and alert  Airway patency: patent  Nausea & Vomiting: no nausea and no vomiting  Cardiovascular status: blood pressure returned to baseline  Respiratory status: acceptable  Hydration status: euvolemic  Multimodal analgesia pain management approach    No notable events documented.

## 2025-05-28 NOTE — H&P
Current Facility-Administered Medications   Medication Dose Route Frequency Provider Last Rate Last Admin    sodium chloride flush 0.9 % injection 5-40 mL  5-40 mL IntraVENous 2 times per day Petar Barnes DO        sodium chloride flush 0.9 % injection 5-40 mL  5-40 mL IntraVENous PRN Petar Barnes DO        0.9 % sodium chloride infusion   IntraVENous PRN Petar Barnes DO            Allergies   Allergen Reactions    Adhesive Tape Rash       ROS:   Constitutional: Negative for fever, activity change and appetite change.   HENT: Negative for epistaxis.   Eyes: Negative for diploplia, blurred vision.   Respiratory: Negative for cough, chest tightness, shortness of breath and wheezing.   Cardiovascular: pertinent positives in HPI  Gastrointestinal: Negative for abdominal pain and blood in stool.   Genitourinary: Negative for hematuria and difficulty urinating.   Musculoskeletal: Negative for myalgias and gait problem.   Skin: Negative for color change and rash.   Neurological: Negative for syncope and light-headedness.   Psychiatric/Behavioral: Negative for confusion and agitation. The patient is not nervous/anxious.  Heme: no bleeding disorders, no melena or hematochezia  All other review of systems are negative     PHYSICAL EXAM:  Constitutional BP (!) 142/95   Pulse 85   Temp 98.5 °F (36.9 °C) (Temporal)   Resp 17   Ht 1.689 m (5' 6.5\")   Wt 75.3 kg (166 lb)   SpO2 98%   BMI 26.39 kg/m²   Well-developed, no acute distress, well groomed  Eyes: conjunctivae normal, no xanthelasma   Ears, Nose, Throat: oral mucosa moist, no cyanosis   Neck: supple, no JVD, no bruits, no thyromegaly   CV: normal rate, irregular rhythm,  no murmurs, rubs, or gallops. PMI is nondisplaced, Peripheral pulses normal including carotid auscultation, no noted aortic bruit, bilateral femoral and pedal pulses are normal in quality  Lungs: clear to auscultation bilaterally, normal respiratory effort without used of

## 2025-05-28 NOTE — DISCHARGE INSTRUCTIONS
No medication changes. Resume all prescribed medications.  No driving or operating heavy machinery for 24 hours.  Follow-up with Dr Barnes office:  - EC2025 2:00 PM   - Provider appointment: 7/3/25 at 8:30 AM  Questions/concerns: please call [phone].

## 2025-05-28 NOTE — ANESTHESIA PRE PROCEDURE
Ejection fraction is visually estimated at 55-60%(calculated 59%).   Indeterminate diastolic function.      Right Ventricle   Normal right ventricular size and function.      Left Atrium   Normal sized left atrium.   Interatrial septum appears intact.      Right Atrium   Normal right atrium size.      Mitral Valve   Mild thickening of the mitral valve leaflets.      Tricuspid Valve   The tricuspid valve appears structurally normal.      Aortic Valve   The aortic valve appears mildly sclerotic.      Pulmonic Valve   Pulmonic valve is structurally normal.   Mild pulmonic regurgitation present.      Pericardial Effusion   No evidence of pericardial effusion.      Aorta   Aortic root dimension within normal limits.      Conclusions   Summary   Left ventricle is normal in size .   No regional wall motion abnormalities seen.   Normal left ventricular ejection fraction.   Mild thickening of the mitral valve leaflets.   The aortic valve appears mildly sclerotic.    Anesthesia Evaluation  Patient summary reviewed and Nursing notes reviewed  Airway: Mallampati: II  TM distance: >3 FB   Neck ROM: full  Mouth opening: > = 3 FB   Dental: normal exam         Pulmonary:   (+)       decreased breath sounds    current smoker                           Cardiovascular:  Exercise tolerance: poor (<4 METS)  (+) hypertension: moderate, dysrhythmias: atrial fibrillation, hyperlipidemia      ECG reviewed  Rhythm: irregular  Rate: normal  Echocardiogram reviewed         Beta Blocker:  Dose within 24 Hrs         Neuro/Psych:   (+) CVA:, TIA            GI/Hepatic/Renal:   (+) GERD: well controlled          Endo/Other:    (+) blood dyscrasia: anticoagulation therapy, arthritis: OA., malignancy/cancer.                 Abdominal:   (+) obese    Abdomen: soft.      Vascular:   + PVD, aortic or cerebral, DVT.       Other Findings:             Anesthesia Plan      MAC     ASA 3       Induction: intravenous.      Anesthetic plan and risks discussed

## 2025-05-29 LAB
EKG ATRIAL RATE: 59 BPM
EKG ATRIAL RATE: 79 BPM
EKG P AXIS: 36 DEGREES
EKG P-R INTERVAL: 198 MS
EKG Q-T INTERVAL: 356 MS
EKG Q-T INTERVAL: 416 MS
EKG QRS DURATION: 90 MS
EKG QRS DURATION: 90 MS
EKG QTC CALCULATION (BAZETT): 402 MS
EKG QTC CALCULATION (BAZETT): 411 MS
EKG R AXIS: 89 DEGREES
EKG R AXIS: 91 DEGREES
EKG T AXIS: 72 DEGREES
EKG T AXIS: 77 DEGREES
EKG VENTRICULAR RATE: 59 BPM
EKG VENTRICULAR RATE: 77 BPM

## 2025-05-29 PROCEDURE — 93010 ELECTROCARDIOGRAM REPORT: CPT | Performed by: INTERNAL MEDICINE

## 2025-06-04 ENCOUNTER — TELEPHONE (OUTPATIENT)
Dept: NON INVASIVE DIAGNOSTICS | Age: 70
End: 2025-06-04

## 2025-06-04 ENCOUNTER — CLINICAL SUPPORT (OUTPATIENT)
Dept: NON INVASIVE DIAGNOSTICS | Age: 70
End: 2025-06-04
Payer: MEDICARE

## 2025-06-04 VITALS
SYSTOLIC BLOOD PRESSURE: 126 MMHG | OXYGEN SATURATION: 97 % | RESPIRATION RATE: 16 BRPM | HEART RATE: 71 BPM | DIASTOLIC BLOOD PRESSURE: 64 MMHG | TEMPERATURE: 97.3 F

## 2025-06-04 DIAGNOSIS — R74.8 ELEVATED SERUM GGT LEVEL: Primary | ICD-10-CM

## 2025-06-04 DIAGNOSIS — I48.0 PAF (PAROXYSMAL ATRIAL FIBRILLATION) (HCC): Primary | ICD-10-CM

## 2025-06-04 PROCEDURE — 93000 ELECTROCARDIOGRAM COMPLETE: CPT | Performed by: STUDENT IN AN ORGANIZED HEALTH CARE EDUCATION/TRAINING PROGRAM

## 2025-06-04 NOTE — TELEPHONE ENCOUNTER
----- Message from Dr. Petar Barnes DO sent at 5/31/2025  8:01 PM EDT -----  Regarding: us  Elevated ggt.    Recommend right upper quadrant US.    -Petar Barnes DO

## 2025-07-03 ENCOUNTER — OFFICE VISIT (OUTPATIENT)
Dept: NON INVASIVE DIAGNOSTICS | Age: 70
End: 2025-07-03
Payer: MEDICARE

## 2025-07-03 VITALS
WEIGHT: 167.2 LBS | BODY MASS INDEX: 26.87 KG/M2 | DIASTOLIC BLOOD PRESSURE: 70 MMHG | SYSTOLIC BLOOD PRESSURE: 116 MMHG | RESPIRATION RATE: 18 BRPM | HEIGHT: 66 IN | HEART RATE: 58 BPM | OXYGEN SATURATION: 96 % | TEMPERATURE: 97 F

## 2025-07-03 DIAGNOSIS — Z79.01 ANTICOAGULATED: ICD-10-CM

## 2025-07-03 DIAGNOSIS — I10 HYPERTENSION, UNSPECIFIED TYPE: ICD-10-CM

## 2025-07-03 DIAGNOSIS — I48.0 PAF (PAROXYSMAL ATRIAL FIBRILLATION) (HCC): Primary | ICD-10-CM

## 2025-07-03 PROCEDURE — 1090F PRES/ABSN URINE INCON ASSESS: CPT | Performed by: NURSE PRACTITIONER

## 2025-07-03 PROCEDURE — G8399 PT W/DXA RESULTS DOCUMENT: HCPCS | Performed by: NURSE PRACTITIONER

## 2025-07-03 PROCEDURE — 1159F MED LIST DOCD IN RCRD: CPT | Performed by: NURSE PRACTITIONER

## 2025-07-03 PROCEDURE — 1036F TOBACCO NON-USER: CPT | Performed by: NURSE PRACTITIONER

## 2025-07-03 PROCEDURE — G8419 CALC BMI OUT NRM PARAM NOF/U: HCPCS | Performed by: NURSE PRACTITIONER

## 2025-07-03 PROCEDURE — 1123F ACP DISCUSS/DSCN MKR DOCD: CPT | Performed by: NURSE PRACTITIONER

## 2025-07-03 PROCEDURE — 3078F DIAST BP <80 MM HG: CPT | Performed by: NURSE PRACTITIONER

## 2025-07-03 PROCEDURE — 99214 OFFICE O/P EST MOD 30 MIN: CPT | Performed by: NURSE PRACTITIONER

## 2025-07-03 PROCEDURE — 3017F COLORECTAL CA SCREEN DOC REV: CPT | Performed by: NURSE PRACTITIONER

## 2025-07-03 PROCEDURE — 3074F SYST BP LT 130 MM HG: CPT | Performed by: NURSE PRACTITIONER

## 2025-07-03 PROCEDURE — 93000 ELECTROCARDIOGRAM COMPLETE: CPT | Performed by: STUDENT IN AN ORGANIZED HEALTH CARE EDUCATION/TRAINING PROGRAM

## 2025-07-03 PROCEDURE — G8427 DOCREV CUR MEDS BY ELIG CLIN: HCPCS | Performed by: NURSE PRACTITIONER

## 2025-07-03 RX ORDER — CALCIUM CARBONATE 500(1250)
500 TABLET ORAL DAILY
COMMUNITY

## 2025-07-03 NOTE — PROGRESS NOTES
[facility] Electrophysiology  Outpatient Progress Note  Moon Holland  1955  Date of Service: 7/3/2025  PCP: Gil Albarran Jr., MD  Electrophysiologist: Dr. Barnes         Subjective: Moon Holland is seen for follow-up and management of: afib s/p cardioversion     Last seen in the office with Jordon Foy CNP on 5/7/2025  Cardioversion on 5/28/2025     PMH as noted below significant for nonvalvular paroxysmal AF sp DCCV x2 (4/1/2021, 7/26/2023), CVA (2018), HTN, GI bleed/C. difficile colitis (2020), and stage III left breast CA on chemotherapy/radiation (completed in 2022) and lumpectomy (2021). In 2018, patient had CVA and was diagnosed with AF shortly thereafter, which was managed with OAC.  In 2020, she had GI bleed/C. difficile colitis which required PRBCs.  In 2021, she recurrence of AF, which was treated with DCCV.  In 2023, she recurrence of AF, which was treated with DCCV.  In 10/2023, she was referred to Dr. Barnes for, who recommended rhythm control due to patient's relatively young age.  On 10/29/2024, she underwent DCCV.    Fell December and broke her shoulder.    She had recurrence of afib and had cardioversion 5/28/25. No known recurrence since then and does not feel any different from before or now.   Was asymptomatic in afib in the past also.  Scheduled for US GB soon.      Prior cardiac testing:  - TTE: 11/7/2024: EF 60% trace AR, trace MR  -Limited TTE (11/4/2021): LVEF = 55-60%, normal.  - Limited TTE (8/3/2021): LVEF = 55-60%, average LV global longitudinal strain = -17%.  - Limited TTE (5/1/2021): LVEF = 55%, normal.  - Limited TTE (12/8/2020): LVEF = 60-65%, mild AI.  - ISAÍAS (5/21/2018): LVEF = 50-55%, no PFO, moderate LAE, no LA/ZANDER thrombus, mild MR.  - TTE (2/12/2018): LVEF = 55%, normal.       Patient Active Problem List    Diagnosis Date Noted    Swollen lymph nodes 12/02/2022     Priority: Medium    Aortic

## 2025-07-08 ENCOUNTER — HOSPITAL ENCOUNTER (OUTPATIENT)
Dept: ULTRASOUND IMAGING | Age: 70
Discharge: HOME | End: 2025-07-10
Attending: STUDENT IN AN ORGANIZED HEALTH CARE EDUCATION/TRAINING PROGRAM
Payer: MEDICARE

## 2025-07-08 DIAGNOSIS — R74.8 ELEVATED SERUM GGT LEVEL: ICD-10-CM

## 2025-07-08 PROCEDURE — 76705 ECHO EXAM OF ABDOMEN: CPT

## 2025-07-17 ENCOUNTER — TELEPHONE (OUTPATIENT)
Dept: NON INVASIVE DIAGNOSTICS | Age: 70
End: 2025-07-17

## 2025-07-17 NOTE — TELEPHONE ENCOUNTER
----- Message from Dr. Petar Barnes DO sent at 7/14/2025  1:02 PM EDT -----  Regarding: Hepatic ultrasound  Hepatic ultrasound with evidence of fatty liver.    Follow-up with PCP regarding fatty liver and elevated ALP.    -Petar Barnes DO

## 2025-07-17 NOTE — TELEPHONE ENCOUNTER
Pt notified of results and verbalized understanding.    Electronically signed by Bri Goodman MA on 7/17/2025 at 2:52 PM

## (undated) DEVICE — ADHESIVE SKIN CLSR 0.7ML TOP DERMBND ADV

## (undated) DEVICE — MARKER,SKIN,WI/RULER AND LABELS: Brand: MEDLINE

## (undated) DEVICE — DRAPE,CHEST,FENES,15X10,STERIL: Brand: MEDLINE

## (undated) DEVICE — PACK PROCEDURE SURG GEN CUST

## (undated) DEVICE — COVER HNDL LT DISP

## (undated) DEVICE — SOLUTION IV IRRIG POUR BRL 0.9% SODIUM CHL 2F7124

## (undated) DEVICE — TRAY AMBULATORY REUSABLE

## (undated) DEVICE — 4-PORT MANIFOLD: Brand: NEPTUNE 2

## (undated) DEVICE — GLOVE ORANGE PI 8   MSG9080

## (undated) DEVICE — NEEDLE HYPO 25GA L1.5IN BLU POLYPR HUB S STL REG BVL STR

## (undated) DEVICE — CLAMP TONSIL

## (undated) DEVICE — ELECTRODE PT RET AD L9FT HI MOIST COND ADH HYDRGEL CORDED

## (undated) DEVICE — BLADE ES ELASTOMERIC COAT INSUL DURABLE BEND UPTO 90DEG

## (undated) DEVICE — BLOCK BITE 60FR RUBBER ADLT DENTAL

## (undated) DEVICE — APPLICATOR PREP 26ML 0.7% IOD POVACRYLEX 74% ISO ALC ST

## (undated) DEVICE — GOWN,SIRUS,FABRNF,XL,20/CS: Brand: MEDLINE

## (undated) DEVICE — INTENDED FOR TISSUE SEPARATION, AND OTHER PROCEDURES THAT REQUIRE A SHARP SURGICAL BLADE TO PUNCTURE OR CUT.: Brand: BARD-PARKER ® STAINLESS STEEL BLADES

## (undated) DEVICE — DRAPE C ARM W41XL74IN UNIV MOB W RUBBERBAND CLP

## (undated) DEVICE — GRADUATE TRIANG MEASURE 1000ML BLK PRNT

## (undated) DEVICE — DECANTER: Brand: UNBRANDED

## (undated) DEVICE — DOUBLE BASIN SET: Brand: MEDLINE INDUSTRIES, INC.

## (undated) DEVICE — SHEET, T, LAPAROTOMY, STERILE: Brand: MEDLINE

## (undated) DEVICE — SPONGE,LAP,4"X18",XR,ST,5/PK,40PK/CS: Brand: MEDLINE INDUSTRIES, INC.

## (undated) DEVICE — TRAP SPEC MUCUS FOR SUCT

## (undated) DEVICE — CONTROL SYRINGE LUER-LOCK TIP: Brand: MONOJECT

## (undated) DEVICE — TOWEL,OR,DSP,ST,BLUE,STD,6/PK,12PK/CS: Brand: MEDLINE

## (undated) DEVICE — CLAMP KELLY CURVED

## (undated) DEVICE — SPONGE GZ W4XL4IN RAYON POLY FILL CVR W/ NONWOVEN FAB

## (undated) DEVICE — NEEDLE SYR 18GA L1.5IN RED PLAS HUB S STL BLNT FILL W/O

## (undated) DEVICE — DRAPE,LAPAROTOMY,PCH,STERILE: Brand: MEDLINE